# Patient Record
Sex: FEMALE | Race: BLACK OR AFRICAN AMERICAN | Employment: OTHER | URBAN - METROPOLITAN AREA
[De-identification: names, ages, dates, MRNs, and addresses within clinical notes are randomized per-mention and may not be internally consistent; named-entity substitution may affect disease eponyms.]

---

## 2017-01-03 ENCOUNTER — APPOINTMENT (OUTPATIENT)
Dept: GENERAL RADIOLOGY | Facility: HOSPITAL | Age: 82
DRG: 193 | End: 2017-01-03
Attending: EMERGENCY MEDICINE
Payer: MEDICARE

## 2017-01-03 ENCOUNTER — HOSPITAL ENCOUNTER (INPATIENT)
Facility: HOSPITAL | Age: 82
LOS: 8 days | Discharge: NURSING, REHAB OR LONG-TERM CARE | DRG: 193 | End: 2017-01-11
Attending: EMERGENCY MEDICINE | Admitting: HOSPITALIST
Payer: MEDICARE

## 2017-01-03 DIAGNOSIS — I65.21 STENOSIS OF RIGHT CAROTID ARTERY: ICD-10-CM

## 2017-01-03 DIAGNOSIS — E86.0 DEHYDRATION: ICD-10-CM

## 2017-01-03 DIAGNOSIS — J18.9 COMMUNITY ACQUIRED PNEUMONIA: ICD-10-CM

## 2017-01-03 DIAGNOSIS — E87.1 HYPONATREMIA: Primary | ICD-10-CM

## 2017-01-03 DIAGNOSIS — I63.511 ACUTE RIGHT MCA STROKE (HCC): ICD-10-CM

## 2017-01-03 LAB
ALBUMIN SERPL-MCNC: 2.7 G/DL (ref 3.5–4.8)
ALP LIVER SERPL-CCNC: 108 U/L (ref 55–142)
ALT SERPL-CCNC: 64 U/L (ref 14–54)
AST SERPL-CCNC: 104 U/L (ref 15–41)
BASOPHILS # BLD AUTO: 0.03 X10(3) UL (ref 0–0.1)
BASOPHILS NFR BLD AUTO: 0.3 %
BILIRUB SERPL-MCNC: 1.2 MG/DL (ref 0.1–2)
BUN BLD-MCNC: 30 MG/DL (ref 8–20)
CALCIUM BLD-MCNC: 8.7 MG/DL (ref 8.3–10.3)
CHLORIDE: 87 MMOL/L (ref 101–111)
CO2: 21 MMOL/L (ref 22–32)
CREAT BLD-MCNC: 1.68 MG/DL (ref 0.55–1.02)
EOSINOPHIL # BLD AUTO: 0.01 X10(3) UL (ref 0–0.3)
EOSINOPHIL NFR BLD AUTO: 0.1 %
ERYTHROCYTE [DISTWIDTH] IN BLOOD BY AUTOMATED COUNT: 11.9 % (ref 11.5–16)
GLUCOSE BLD-MCNC: 170 MG/DL (ref 70–99)
HCT VFR BLD AUTO: 32.8 % (ref 34–50)
HGB BLD-MCNC: 11.8 G/DL (ref 12–16)
IMMATURE GRANULOCYTE COUNT: 0.1 X10(3) UL (ref 0–1)
IMMATURE GRANULOCYTE RATIO %: 0.9 %
LYMPHOCYTES # BLD AUTO: 0.52 X10(3) UL (ref 0.9–4)
LYMPHOCYTES NFR BLD AUTO: 4.5 %
M PROTEIN MFR SERPL ELPH: 8.4 G/DL (ref 6.1–8.3)
MCH RBC QN AUTO: 34.6 PG (ref 27–33.2)
MCHC RBC AUTO-ENTMCNC: 36 G/DL (ref 31–37)
MCV RBC AUTO: 96.2 FL (ref 81–100)
MONOCYTES # BLD AUTO: 0.97 X10(3) UL (ref 0.1–0.6)
MONOCYTES NFR BLD AUTO: 8.3 %
NEUTROPHIL ABS PRELIM: 10.01 X10 (3) UL (ref 1.3–6.7)
NEUTROPHILS # BLD AUTO: 10.01 X10(3) UL (ref 1.3–6.7)
NEUTROPHILS NFR BLD AUTO: 85.9 %
PLATELET # BLD AUTO: 371 10(3)UL (ref 150–450)
POTASSIUM SERPL-SCNC: 4.1 MMOL/L (ref 3.6–5.1)
RBC # BLD AUTO: 3.41 X10(6)UL (ref 3.8–5.1)
RED CELL DISTRIBUTION WIDTH-SD: 41.7 FL (ref 35.1–46.3)
SODIUM SERPL-SCNC: 122 MMOL/L (ref 136–144)
TSI SER-ACNC: 1.86 MIU/ML (ref 0.35–5.5)
WBC # BLD AUTO: 11.6 X10(3) UL (ref 4–13)

## 2017-01-03 PROCEDURE — 99223 1ST HOSP IP/OBS HIGH 75: CPT | Performed by: INTERNAL MEDICINE

## 2017-01-03 PROCEDURE — 71010 XR CHEST AP PORTABLE  (CPT=71010): CPT

## 2017-01-03 RX ORDER — POLYETHYLENE GLYCOL 3350 17 G/17G
17 POWDER, FOR SOLUTION ORAL DAILY PRN
Status: DISCONTINUED | OUTPATIENT
Start: 2017-01-03 | End: 2017-01-11

## 2017-01-03 RX ORDER — SODIUM CHLORIDE 9 MG/ML
1000 INJECTION, SOLUTION INTRAVENOUS ONCE
Status: COMPLETED | OUTPATIENT
Start: 2017-01-03 | End: 2017-01-03

## 2017-01-03 RX ORDER — ACETAMINOPHEN 325 MG/1
650 TABLET ORAL EVERY 6 HOURS PRN
Status: DISCONTINUED | OUTPATIENT
Start: 2017-01-03 | End: 2017-01-06 | Stop reason: DRUGHIGH

## 2017-01-03 RX ORDER — SODIUM CHLORIDE 9 MG/ML
INJECTION, SOLUTION INTRAVENOUS CONTINUOUS
Status: DISCONTINUED | OUTPATIENT
Start: 2017-01-03 | End: 2017-01-07

## 2017-01-03 RX ORDER — IPRATROPIUM BROMIDE AND ALBUTEROL SULFATE 2.5; .5 MG/3ML; MG/3ML
3 SOLUTION RESPIRATORY (INHALATION)
Status: DISCONTINUED | OUTPATIENT
Start: 2017-01-03 | End: 2017-01-05

## 2017-01-03 RX ORDER — HEPARIN SODIUM 5000 [USP'U]/ML
5000 INJECTION, SOLUTION INTRAVENOUS; SUBCUTANEOUS EVERY 8 HOURS
Status: DISCONTINUED | OUTPATIENT
Start: 2017-01-03 | End: 2017-01-05

## 2017-01-03 RX ORDER — ONDANSETRON 2 MG/ML
4 INJECTION INTRAMUSCULAR; INTRAVENOUS EVERY 6 HOURS PRN
Status: DISCONTINUED | OUTPATIENT
Start: 2017-01-03 | End: 2017-01-06

## 2017-01-03 NOTE — ED INITIAL ASSESSMENT (HPI)
Cough and congestion since Tuesday.  with patient.  said patient has had three syncopal episodes since. Patient complaining right sided rib cage pain.  said patient fell on her right side with one of the syncopal episodes.  Denies fever

## 2017-01-04 ENCOUNTER — APPOINTMENT (OUTPATIENT)
Dept: CV DIAGNOSTICS | Facility: HOSPITAL | Age: 82
DRG: 193 | End: 2017-01-04
Attending: INTERNAL MEDICINE
Payer: MEDICARE

## 2017-01-04 ENCOUNTER — APPOINTMENT (OUTPATIENT)
Dept: CT IMAGING | Facility: HOSPITAL | Age: 82
DRG: 193 | End: 2017-01-04
Attending: INTERNAL MEDICINE
Payer: MEDICARE

## 2017-01-04 LAB
ALLENS TEST: POSITIVE
ARTERIAL BLD GAS O2 SATURATION: 97 % (ref 92–100)
ARTERIAL BLOOD GAS BASE EXCESS: -4.8
ARTERIAL BLOOD GAS HCO3: 18.7 MEQ/L (ref 22–26)
ARTERIAL BLOOD GAS PCO2: 30 MM HG (ref 35–45)
ARTERIAL BLOOD GAS PH: 7.41 (ref 7.35–7.45)
ARTERIAL BLOOD GAS PO2: 125 MM HG (ref 80–105)
ATRIAL RATE: 28 BPM
ATRIAL RATE: 77 BPM
BILIRUB UR QL STRIP.AUTO: NEGATIVE
BUN BLD-MCNC: 27 MG/DL (ref 8–20)
CALCIUM BLD-MCNC: 8 MG/DL (ref 8.3–10.3)
CALCULATED O2 SATURATION: 99 % (ref 92–100)
CARBOXYHEMOGLOBIN: 1 % SAT (ref 0–3)
CHLORIDE: 93 MMOL/L (ref 101–111)
CLARITY UR REFRACT.AUTO: CLEAR
CO2: 21 MMOL/L (ref 22–32)
COLOR UR AUTO: YELLOW
CREAT BLD-MCNC: 1.04 MG/DL (ref 0.55–1.02)
GLUCOSE BLD-MCNC: 114 MG/DL (ref 65–99)
GLUCOSE BLD-MCNC: 91 MG/DL (ref 70–99)
GLUCOSE UR STRIP.AUTO-MCNC: NEGATIVE MG/DL
L/M: 2 L/MIN
METHEMOGLOBIN: 0.6 % SAT (ref 0.4–1.5)
NITRITE UR QL STRIP.AUTO: NEGATIVE
OSMOLALITY URINE: 562 MOSM/KG (ref 300–1300)
P AXIS: 62 DEGREES
P-R INTERVAL: 136 MS
PATIENT TEMPERATURE: 98.5 F
PH UR STRIP.AUTO: 6 [PH] (ref 4.5–8)
POTASSIUM SERPL-SCNC: 3.5 MMOL/L (ref 3.6–5.1)
PROCALCITONIN SERPL-MCNC: 0.95 NG/ML (ref ?–0.11)
PROT UR STRIP.AUTO-MCNC: 30 MG/DL
Q-T INTERVAL: 410 MS
Q-T INTERVAL: 498 MS
QRS DURATION: 92 MS
QRS DURATION: 94 MS
QTC CALCULATION (BEZET): 463 MS
QTC CALCULATION (BEZET): 488 MS
R AXIS: 18 DEGREES
R AXIS: 31 DEGREES
RBC UR QL AUTO: NEGATIVE
RESPIRATORY PANEL NEG:: NEGATIVE
SODIUM SERPL-SCNC: 124 MMOL/L (ref 136–144)
SODIUM SERPL-SCNC: 13 MMOL/L
SP GR UR STRIP.AUTO: 1.02 (ref 1–1.03)
T AXIS: 47 DEGREES
T AXIS: 53 DEGREES
TOTAL HEMOGLOBIN: 11.7 G/DL (ref 11.7–16)
TSI SER-ACNC: 1.15 MIU/ML (ref 0.35–5.5)
UROBILINOGEN UR STRIP.AUTO-MCNC: <2 MG/DL
VENTRICULAR RATE: 58 BPM
VENTRICULAR RATE: 77 BPM

## 2017-01-04 PROCEDURE — 99232 SBSQ HOSP IP/OBS MODERATE 35: CPT | Performed by: INTERNAL MEDICINE

## 2017-01-04 PROCEDURE — 70450 CT HEAD/BRAIN W/O DYE: CPT

## 2017-01-04 PROCEDURE — 93306 TTE W/DOPPLER COMPLETE: CPT | Performed by: INTERNAL MEDICINE

## 2017-01-04 PROCEDURE — 93306 TTE W/DOPPLER COMPLETE: CPT

## 2017-01-04 RX ORDER — ARIPIPRAZOLE 15 MG/1
40 TABLET ORAL ONCE
Status: COMPLETED | OUTPATIENT
Start: 2017-01-04 | End: 2017-01-04

## 2017-01-04 RX ORDER — SPIRONOLACTONE 25 MG/1
12.5 TABLET ORAL DAILY
Status: DISCONTINUED | OUTPATIENT
Start: 2017-01-05 | End: 2017-01-10

## 2017-01-04 NOTE — CM/SW NOTE
SW received order for home health and met with pt and pt's spouse for assessment and d/c planning. Pt is a 80 y.o female admitted on 01/03 with dx of hyponatremia. Pt has no recent admissions per chart review. Pt is A&O and on room air.  Pt reports living

## 2017-01-04 NOTE — PAYOR COMM NOTE
Attending Physician: Karine Aldrich MD    1/3    ED  LATE    Syncope     Stated Complaint: SYNCOPE COUPLE TIMES THE PAST COUPLE DAYS        71-year-old  who presents the emergency room today for complaint of weakness, FATIGUE, SOB    Patient has been fe 122 (*)        Chloride  87 (*)        CO2  21.0 (*)                  RBC  3.41 (*)        HGB  11.8 (*)        HCT  32.8 (*)        MCH  34.6 (*)        Neutrophil Absolute Prelim  10.01 (*)        Neutrophil Absolute  10.01 (*)        Lymphocyte Absolute NCAL INFUSION  BLOOD CXS X 2

## 2017-01-04 NOTE — PROGRESS NOTES
[facility]  Progress Note    Annamarie Prashant Patient Status:  Emergency    1935 MRN [id number]   Location [address] Attending Feng Patel MD   Hosp Day # 1 PCP Magy Malave MD     No acute issues overnight  + 11.8*   MCV 96.2   .0         Recent Labs   01/03/17  1803 01/04/17  0644   * 124*   K 4.1 3.5*   CL 87* 93*   CO2 21.0* 21.0*   BUN 30* 27*   CREATSERUM 1.68* 1.04*   CA 8.7 8.0*         Recent Labs   01/03/17  1803   ALT 64*   *   ALB

## 2017-01-04 NOTE — CONSULTS
[facility]  Report of Consultation    Gagandeep Spain Patient Status:  Emergency    1935 MRN [id number]   Location [address] [facility] Attending Bren Phillip MD   Hosp Day # 0 PCP Luigi Pack MD     [facility] for [facility] SERVICES, [facility] ([facility]) Comments)    Comment:hyponatremia    Current Medications:    Current facility-administered medications:   •  ipratropium-albuterol (DUONEB) nebulizer solution 3 mL, 3 mL, Nebulization, Q20 Min PRN  •  CefTRIAXone Sodium (ROCEPHIN) 1 g in sodium chloride 0. 01/03/2017   K 4.1 01/03/2017   CL 87 01/03/2017   CO2 21.0 01/03/2017    01/03/2017   CA 8.7 01/03/2017   ALB 2.7 01/03/2017   ALKPHO 108 01/03/2017   BILT 1.2 01/03/2017   TP 8.4 01/03/2017    01/03/2017   ALT 64 01/03/2017   TSH 1.860 01/0

## 2017-01-04 NOTE — H&P
[facility]  History and Physical     Jamshid Bio Patient Status:  Inpatient    1935 MRN [id number]   [facility] 3NE-A Attending Josie Easley MD   Hosp Day # 0 PCP Eunice Lyle MD     Chief Complaint: weak    Hist Brother    • Diabetes Mother      rheumatic heart disease       Allergies:    Ace Inhibitors          Coughing  Amlodipine              Swelling  Furosemide              Other (See Comments)    Comment:hyponatremia  Thiazide-Type Diure*    Other (See Commen Labs:  Recent Labs   Lab  01/03/17   1803   WBC  11.6   HGB  11.8*   MCV  96.2   PLT  371.0       Recent Labs   Lab  01/03/17   1803   GLU  170*   BUN  30*   CREATSERUM  1.68*   CA  8.7   ALB  2.7*   NA  122*   K  4.1   CL  87*   CO2  21.0*   ALKPHO  1

## 2017-01-04 NOTE — PLAN OF CARE
Problem: DISCHARGE PLANNING - CASE MANAGEMENT  Goal: Discharge to post-acute care or home with appropriate resources  INTERVENTIONS:  - Conduct assessment to determine patient/family and health care team treatment goals, and need for post-acute services ba

## 2017-01-04 NOTE — CONSULTS
[facility]    PATIENT'S NAME: Colleen Marco A PHYSICIAN: Lisa Dawkins MD   CONSULTING PHYSICIAN: Halina Huber MD   PATIENT ACCOUNT#:   [id number]    LOCATION:  [address] [place] [address]  MEDICAL RECORD #:   [id number]       DATE OF BIRTH:  [birth year] complains of fatigue and weakness. HEENT: Eyes:  She denies recent vision loss or changes. ENT:  She denies recent hearing loss or changes. RESPIRATORY: As above. CARDIOVASCULAR: As above. GASTROINTESTINAL: She complains of a decreased appetite. history of this. Renal service has been consulted. 4.   Acute renal failure. This is most likely due to dehydration. 5.   Elevated liver function tests. I would recommend hydration and monitor these. 6.   Anemia. This is chronic.     Thank you for

## 2017-01-04 NOTE — PLAN OF CARE
GASTROINTESTINAL - ADULT    • Minimal or absence of nausea and vomiting Progressing    • Maintains or returns to baseline bowel function Progressing    • Maintains adequate nutritional intake (undernourished) Progressing    • Achieves appropriate nutrition

## 2017-01-04 NOTE — CONSULTS
Cardiology consult SOB    URI for over a week; now with community acquired pneumonia  Fatigue, weakness, SOB  No prior cardiac history  Hyponatremia  ARF  Anemia    Plan    Echo for LV function with SOB  Antibiotics per admitting  Hydration    Dictated: #

## 2017-01-04 NOTE — PROGRESS NOTES
[facility]  Progress Note     Dante Root Patient Status:  Inpatient    1935 MRN [id number]   [facility] 3NE-A Attending Brooklyn Sarkar MD   Hosp Day # 1 PCP Minor Bundy MD     Chief Complaint: weak     S: Patient 1. Pneumonia  1. CXR with possible L basilar infiltrate vs atelectasis  2. Will start treatment for CAP with ceftriaxone, azithromycin  3. Will send legionella urinary antigen given diarrhea, hyponatremia, mild transaminitis, and exposure to steam  4.

## 2017-01-04 NOTE — PROGRESS NOTES
NURSING ADMISSION NOTE      Patient admitted via Cart  Oriented to room. Safety precautions initiated. Bed in low position. Call light in reach.

## 2017-01-04 NOTE — ED PROVIDER NOTES
Patient Seen in: [facility] Emergency Department    History   Patient presents with:  Syncope (cardiovascular, neurologic)    Stated Complaint: [facility]    HPI    28-year-old -American female who presents the Oral Tab,  Take 1 tablet (300 mg total) by mouth 3 (three) times daily. PREVIDENT 5000 SENSITIVE 1.1-5 % Dental Paste,     Biotin 10 MG Oral Cap,  Take  by mouth. Mometasone Furoate (ELOCON) 0.1 % Apply Externally Cream,  Apply  topically daily. guarding noted no hepatosplenomegaly is noted. No masses are noted. No hernias are palpated. Extremity exam reveals no clubbing cyanosis or edema. Patient has good radial pulses noted bilaterally in the upper extremities .     There are no rashes note ischemic inferior wall MI as interpreted by the computer. EKG    Rate, intervals and axes as noted on EKG Report. Rate: 77 bpm  Rhythm: Sinus Rhythm  Reading: Normal sinus rhythm without acute ischemic changes noted.   Agree with computer report f

## 2017-01-04 NOTE — PROGRESS NOTES
Seen and examined. Notes reviewed. Currently resting comfortably and not c/o difficulty breathing.     NSR on tele   Mild systolic HTN this morning    Echo with preserved LV systolic function    Serum sodium improved but remains very low suggesting the pres

## 2017-01-05 ENCOUNTER — APPOINTMENT (OUTPATIENT)
Dept: GENERAL RADIOLOGY | Facility: HOSPITAL | Age: 82
DRG: 193 | End: 2017-01-05
Attending: Other
Payer: MEDICARE

## 2017-01-05 ENCOUNTER — APPOINTMENT (OUTPATIENT)
Dept: GENERAL RADIOLOGY | Facility: HOSPITAL | Age: 82
DRG: 193 | End: 2017-01-05
Attending: HOSPITALIST
Payer: MEDICARE

## 2017-01-05 ENCOUNTER — APPOINTMENT (OUTPATIENT)
Dept: CT IMAGING | Facility: HOSPITAL | Age: 82
DRG: 193 | End: 2017-01-05
Attending: NURSE PRACTITIONER
Payer: MEDICARE

## 2017-01-05 LAB
ALBUMIN SERPL-MCNC: 2 G/DL (ref 3.5–4.8)
ALLENS TEST: POSITIVE
ALLENS TEST: POSITIVE
ALP LIVER SERPL-CCNC: 94 U/L (ref 55–142)
ALT SERPL-CCNC: 57 U/L (ref 14–54)
ANTIBODY SCREEN: NEGATIVE
ARTERIAL BLD GAS O2 SATURATION: 93 % (ref 92–100)
ARTERIAL BLD GAS O2 SATURATION: 97 % (ref 92–100)
ARTERIAL BLOOD GAS BASE EXCESS: -6.3
ARTERIAL BLOOD GAS BASE EXCESS: -6.8
ARTERIAL BLOOD GAS HCO3: 17 MEQ/L (ref 22–26)
ARTERIAL BLOOD GAS HCO3: 17.3 MEQ/L (ref 22–26)
ARTERIAL BLOOD GAS PCO2: 28 MM HG (ref 35–45)
ARTERIAL BLOOD GAS PCO2: 29 MM HG (ref 35–45)
ARTERIAL BLOOD GAS PH: 7.39 (ref 7.35–7.45)
ARTERIAL BLOOD GAS PH: 7.4 (ref 7.35–7.45)
ARTERIAL BLOOD GAS PO2: 108 MM HG (ref 80–105)
ARTERIAL BLOOD GAS PO2: 70 MM HG (ref 80–105)
AST SERPL-CCNC: 64 U/L (ref 15–41)
BASOPHILS # BLD AUTO: 0.03 X10(3) UL (ref 0–0.1)
BASOPHILS NFR BLD AUTO: 0.4 %
BILIRUB SERPL-MCNC: 0.5 MG/DL (ref 0.1–2)
BILIRUB UR QL STRIP.AUTO: NEGATIVE
BUN BLD-MCNC: 14 MG/DL (ref 8–20)
BUN BLD-MCNC: 16 MG/DL (ref 8–20)
BUN BLD-MCNC: 16 MG/DL (ref 8–20)
CALCIUM BLD-MCNC: 7.9 MG/DL (ref 8.3–10.3)
CALCULATED O2 SATURATION: 93 % (ref 92–100)
CALCULATED O2 SATURATION: 98 % (ref 92–100)
CARBOXYHEMOGLOBIN: 0.8 % SAT (ref 0–3)
CARBOXYHEMOGLOBIN: 1 % SAT (ref 0–3)
CHLORIDE: 101 MMOL/L (ref 101–111)
CHLORIDE: 101 MMOL/L (ref 101–111)
CHLORIDE: 103 MMOL/L (ref 101–111)
CLARITY UR REFRACT.AUTO: CLEAR
CO2: 21 MMOL/L (ref 22–32)
COLOR UR AUTO: YELLOW
CREAT BLD-MCNC: 0.78 MG/DL (ref 0.55–1.02)
CREAT BLD-MCNC: 0.78 MG/DL (ref 0.55–1.02)
CREAT BLD-MCNC: 0.88 MG/DL (ref 0.55–1.02)
EOSINOPHIL # BLD AUTO: 0.04 X10(3) UL (ref 0–0.3)
EOSINOPHIL NFR BLD AUTO: 0.5 %
ERYTHROCYTE [DISTWIDTH] IN BLOOD BY AUTOMATED COUNT: 12.3 % (ref 11.5–16)
ERYTHROCYTE [DISTWIDTH] IN BLOOD BY AUTOMATED COUNT: 12.7 % (ref 11.5–16)
GLUCOSE BLD-MCNC: 108 MG/DL (ref 70–99)
GLUCOSE BLD-MCNC: 94 MG/DL (ref 70–99)
GLUCOSE BLD-MCNC: 94 MG/DL (ref 70–99)
GLUCOSE BLD-MCNC: 99 MG/DL (ref 65–99)
GLUCOSE UR STRIP.AUTO-MCNC: NEGATIVE MG/DL
HAV IGM SER QL: 2.5 MG/DL (ref 1.7–3)
HCT VFR BLD AUTO: 26.6 % (ref 34–50)
HCT VFR BLD AUTO: 26.8 % (ref 34–50)
HGB BLD-MCNC: 9.1 G/DL (ref 12–16)
HGB BLD-MCNC: 9.4 G/DL (ref 12–16)
HSCRP: >28.5 MG/L (ref ?–3)
IMMATURE GRANULOCYTE COUNT: 0.05 X10(3) UL (ref 0–1)
IMMATURE GRANULOCYTE RATIO %: 0.7 %
INR BLD: 1.2 (ref 0.89–1.12)
KETONES UR STRIP.AUTO-MCNC: NEGATIVE MG/DL
L/M: 2 L/MIN
L/M: 3 L/MIN
LEGIONELLA PNEUMOPHILA AG, URI: NEGATIVE
LEUKOCYTE ESTERASE UR QL STRIP.AUTO: NEGATIVE
LYMPHOCYTES # BLD AUTO: 1.01 X10(3) UL (ref 0.9–4)
LYMPHOCYTES NFR BLD AUTO: 13.5 %
M PROTEIN MFR SERPL ELPH: 7.1 G/DL (ref 6.1–8.3)
MCH RBC QN AUTO: 34.5 PG (ref 27–33.2)
MCH RBC QN AUTO: 34.8 PG (ref 27–33.2)
MCHC RBC AUTO-ENTMCNC: 34 G/DL (ref 31–37)
MCHC RBC AUTO-ENTMCNC: 35.3 G/DL (ref 31–37)
MCV RBC AUTO: 101.5 FL (ref 81–100)
MCV RBC AUTO: 98.5 FL (ref 81–100)
METHEMOGLOBIN: 0.5 % SAT (ref 0.4–1.5)
METHEMOGLOBIN: 0.6 % SAT (ref 0.4–1.5)
MONOCYTES # BLD AUTO: 0.79 X10(3) UL (ref 0.1–0.6)
MONOCYTES NFR BLD AUTO: 10.6 %
NEUTROPHIL ABS PRELIM: 5.56 X10 (3) UL (ref 1.3–6.7)
NEUTROPHILS # BLD AUTO: 5.56 X10(3) UL (ref 1.3–6.7)
NEUTROPHILS NFR BLD AUTO: 74.3 %
NITRITE UR QL STRIP.AUTO: NEGATIVE
PATIENT TEMPERATURE: 98.1 F
PATIENT TEMPERATURE: 98.9 F
PH UR STRIP.AUTO: 6 [PH] (ref 4.5–8)
PLATELET # BLD AUTO: 370 10(3)UL (ref 150–450)
PLATELET # BLD AUTO: 370 10(3)UL (ref 150–450)
POTASSIUM SERPL-SCNC: 3.6 MMOL/L (ref 3.6–5.1)
POTASSIUM SERPL-SCNC: 3.6 MMOL/L (ref 3.6–5.1)
POTASSIUM SERPL-SCNC: 4.1 MMOL/L (ref 3.6–5.1)
PROT UR STRIP.AUTO-MCNC: 30 MG/DL
PSA SERPL DL<=0.01 NG/ML-MCNC: 15.6 SECONDS (ref 12.3–14.8)
RBC # BLD AUTO: 2.64 X10(6)UL (ref 3.8–5.1)
RBC # BLD AUTO: 2.7 X10(6)UL (ref 3.8–5.1)
RBC UR QL AUTO: NEGATIVE
RED CELL DISTRIBUTION WIDTH-SD: 44.5 FL (ref 35.1–46.3)
RED CELL DISTRIBUTION WIDTH-SD: 47.2 FL (ref 35.1–46.3)
RH BLOOD TYPE: POSITIVE
SODIUM SERPL-SCNC: 130 MMOL/L (ref 136–144)
SODIUM SERPL-SCNC: 131 MMOL/L (ref 136–144)
SODIUM SERPL-SCNC: 131 MMOL/L (ref 136–144)
SP GR UR STRIP.AUTO: 1.02 (ref 1–1.03)
TOTAL HEMOGLOBIN: 10.2 G/DL (ref 11.7–16)
TOTAL HEMOGLOBIN: 12.5 G/DL (ref 11.7–16)
UROBILINOGEN UR STRIP.AUTO-MCNC: 2 MG/DL
WBC # BLD AUTO: 7.5 X10(3) UL (ref 4–13)
WBC # BLD AUTO: 7.5 X10(3) UL (ref 4–13)

## 2017-01-05 PROCEDURE — 99291 CRITICAL CARE FIRST HOUR: CPT | Performed by: OTHER

## 2017-01-05 PROCEDURE — 3E03317 INTRODUCTION OF OTHER THROMBOLYTIC INTO PERIPHERAL VEIN, PERCUTANEOUS APPROACH: ICD-10-PCS | Performed by: OTHER

## 2017-01-05 PROCEDURE — 99232 SBSQ HOSP IP/OBS MODERATE 35: CPT | Performed by: INTERNAL MEDICINE

## 2017-01-05 PROCEDURE — 99233 SBSQ HOSP IP/OBS HIGH 50: CPT | Performed by: INTERNAL MEDICINE

## 2017-01-05 PROCEDURE — 71010 XR CHEST AP PORTABLE  (CPT=71010): CPT

## 2017-01-05 PROCEDURE — 70498 CT ANGIOGRAPHY NECK: CPT

## 2017-01-05 PROCEDURE — 70496 CT ANGIOGRAPHY HEAD: CPT

## 2017-01-05 RX ORDER — SODIUM CHLORIDE 9 MG/ML
INJECTION, SOLUTION INTRAVENOUS ONCE
Status: COMPLETED | OUTPATIENT
Start: 2017-01-05 | End: 2017-01-05

## 2017-01-05 RX ORDER — METOPROLOL TARTRATE 5 MG/5ML
5 INJECTION INTRAVENOUS EVERY 6 HOURS PRN
Status: DISCONTINUED | OUTPATIENT
Start: 2017-01-05 | End: 2017-01-11

## 2017-01-05 RX ORDER — METHYLPREDNISOLONE SODIUM SUCCINATE 40 MG/ML
60 INJECTION, POWDER, LYOPHILIZED, FOR SOLUTION INTRAMUSCULAR; INTRAVENOUS EVERY 8 HOURS
Status: COMPLETED | OUTPATIENT
Start: 2017-01-05 | End: 2017-01-06

## 2017-01-05 RX ORDER — SODIUM CHLORIDE 9 MG/ML
INJECTION, SOLUTION INTRAVENOUS CONTINUOUS
Status: DISCONTINUED | OUTPATIENT
Start: 2017-01-05 | End: 2017-01-05

## 2017-01-05 RX ORDER — ASPIRIN 325 MG
325 TABLET ORAL DAILY
Status: DISCONTINUED | OUTPATIENT
Start: 2017-01-05 | End: 2017-01-05

## 2017-01-05 RX ORDER — ASPIRIN 300 MG
300 SUPPOSITORY, RECTAL RECTAL DAILY
Status: DISCONTINUED | OUTPATIENT
Start: 2017-01-05 | End: 2017-01-05

## 2017-01-05 RX ORDER — LABETALOL HYDROCHLORIDE 5 MG/ML
10 INJECTION, SOLUTION INTRAVENOUS ONCE
Status: DISCONTINUED | OUTPATIENT
Start: 2017-01-05 | End: 2017-01-05

## 2017-01-05 RX ORDER — IPRATROPIUM BROMIDE AND ALBUTEROL SULFATE 2.5; .5 MG/3ML; MG/3ML
3 SOLUTION RESPIRATORY (INHALATION)
Status: DISCONTINUED | OUTPATIENT
Start: 2017-01-05 | End: 2017-01-05 | Stop reason: DRUGHIGH

## 2017-01-05 RX ORDER — ATORVASTATIN CALCIUM 80 MG/1
80 TABLET, FILM COATED ORAL NIGHTLY
Status: DISCONTINUED | OUTPATIENT
Start: 2017-01-05 | End: 2017-01-05

## 2017-01-05 RX ORDER — HEPARIN SODIUM 5000 [USP'U]/ML
5000 INJECTION, SOLUTION INTRAVENOUS; SUBCUTANEOUS EVERY 8 HOURS
Status: DISCONTINUED | OUTPATIENT
Start: 2017-01-05 | End: 2017-01-05

## 2017-01-05 RX ORDER — IPRATROPIUM BROMIDE AND ALBUTEROL SULFATE 2.5; .5 MG/3ML; MG/3ML
SOLUTION RESPIRATORY (INHALATION)
Status: COMPLETED
Start: 2017-01-05 | End: 2017-01-05

## 2017-01-05 RX ORDER — IPRATROPIUM BROMIDE AND ALBUTEROL SULFATE 2.5; .5 MG/3ML; MG/3ML
3 SOLUTION RESPIRATORY (INHALATION) EVERY 4 HOURS
Status: DISCONTINUED | OUTPATIENT
Start: 2017-01-05 | End: 2017-01-08

## 2017-01-05 RX ORDER — ACETAMINOPHEN 650 MG/1
650 SUPPOSITORY RECTAL EVERY 4 HOURS PRN
Status: DISCONTINUED | OUTPATIENT
Start: 2017-01-05 | End: 2017-01-06

## 2017-01-05 NOTE — PHYSICAL THERAPY NOTE
Attempted to see pt for PT eval this am, however, per RN, pt is not appropriate for PT this am as pt is weak, lethargic, and \"wiped out from the PNA\". RN request to hold PT eval until 1/6/17.  Therefore, will hold PT eval and attempt to see pt on 1/6/17 a

## 2017-01-05 NOTE — CONSULTS
[facility]  Neurocritical Care       Name: Guerda Darden  MRN: [id number]  Admission Date/Time: 1/3/2017  5:47 PM  Primary Care Provider:  Andreae Jones MD        Reason for Consultation:   Left side hemiparesis, left side neglect an • Localized, secondary osteoarthritis of the lower leg    • Tibial fracture    • Hyponatremia 2009   • Endometrial cancer ([facility] [address] 2009   • Fibrocystic breast changes 2009   • Atypical chest pain 03/10   • Osteoarthritis of hip 11/09   • Unspecified essenti 0 Standard drinks or equivalent per week       Comment: 1-2 glasses wine or martini daily    Drug Use: No              Sexual Activity: Yes               Partners with: Male    Other Topics            Concern  Blood Transfusions      Yes    Comment:14 yr Oral Daily   CefTRIAXone Sodium (ROCEPHIN) 1 g in sodium chloride 0.9 % 100 mL IVPB Add-vantage 1 g Intravenous Q24H   azithromycin (ZITHROMAX) 500 mg in sodium chloride 0.9 % 250 mL IVPB add-vantage 500 mg Intravenous Q24H   0.9%  NaCl infusion  Intraveno normally. # 11: Shoulder shrug is normal and symmetrical.   #12:Tongue is midline. Power of tongue normal.        Motor: LUE 2-3/5. LLE 2-3/5. Right side is normal 5/5. Sensory: Decreased sensations to the left. Gait: Deferred.     Diagnostics:   Ct Brai CONCLUSION:  Increasing size of small to medium-sized left and stable trace right pleural effusions. Increased/consolidation in the mid lower left lower lung. New minimal atelectasis at right lung base.    A preliminary report was provided by the [facility] tel preference. 2. MARLYS critical stenosis on CTA, could be a reason for hypoperfusion and stroke. 3. Syncope and Dehydration  4. CAP    Plan:  1. Initiate Stroke Order Set.  2. IV t-pa bolus at 17.10 followed by infusion over 1 hour. 3. Lipid Panel.  Start L

## 2017-01-05 NOTE — PLAN OF CARE
RESPIRATORY - ADULT    • Achieves optimal ventilation and oxygenation Not Progressing          GASTROINTESTINAL - ADULT    • Minimal or absence of nausea and vomiting Progressing    • Maintains or returns to baseline bowel function Progressing    • Maintai

## 2017-01-05 NOTE — OCCUPATIONAL THERAPY NOTE
Attempted to see pt for OT eval this am. Per PT, RN reports pt is not appropriate for therapy this am as pt is weak, lethargic, and \"wiped out from the PNA\". RN request to hold OT eval until 1/6/17.  Therefore, will hold OT eval and attempt to see pt as a

## 2017-01-05 NOTE — PROGRESS NOTES
[facility]  Progress Note     Kamran Seeds Patient Status:  Inpatient    1935 MRN [id number]   [facility] 3NE-A Attending Belkys Dobson MD   Hosp Day # 2 PCP Monae Rodriguez MD     Chief Complaint: weak     S: Patient reviewed in Epic.   MICRO: blood , UA, respir panel   Medications:   • ipratropium-albuterol  3 mL Nebulization 4 times per day   • Atorvastatin Calcium  80 mg Oral Nightly   • Labetalol HCl  10 mg Intravenous Once   • alteplase (tPA/ACTIVASE) STROKE bolus SpO2 98%    Eval at 10 AM  CV: RRR  PULM: CTAB

## 2017-01-05 NOTE — PROGRESS NOTES
Paged Dr. Maynor Butler in regards to pt. Lethargy. He placed order for ABG. Also notified him that orthostatic B/P is not a possibility at this time.   Also notified that Labetalol is TID and was given at 0800 and 1700, so holding 2100 dose till 0000    Paged

## 2017-01-06 ENCOUNTER — APPOINTMENT (OUTPATIENT)
Dept: MRI IMAGING | Facility: HOSPITAL | Age: 82
DRG: 193 | End: 2017-01-06
Attending: Other
Payer: MEDICARE

## 2017-01-06 ENCOUNTER — APPOINTMENT (OUTPATIENT)
Dept: GENERAL RADIOLOGY | Facility: HOSPITAL | Age: 82
DRG: 193 | End: 2017-01-06
Attending: INTERNAL MEDICINE
Payer: MEDICARE

## 2017-01-06 ENCOUNTER — APPOINTMENT (OUTPATIENT)
Dept: CT IMAGING | Facility: HOSPITAL | Age: 82
DRG: 193 | End: 2017-01-06
Attending: Other
Payer: MEDICARE

## 2017-01-06 ENCOUNTER — APPOINTMENT (OUTPATIENT)
Dept: ULTRASOUND IMAGING | Facility: HOSPITAL | Age: 82
DRG: 193 | End: 2017-01-06
Attending: SURGERY
Payer: MEDICARE

## 2017-01-06 LAB
ALBUMIN SERPL-MCNC: 2.2 G/DL (ref 3.5–4.8)
ALP LIVER SERPL-CCNC: 98 U/L (ref 55–142)
ALT SERPL-CCNC: 58 U/L (ref 14–54)
AST SERPL-CCNC: 51 U/L (ref 15–41)
ATRIAL RATE: 163 BPM
BASOPHILS # BLD AUTO: 0.01 X10(3) UL (ref 0–0.1)
BASOPHILS NFR BLD AUTO: 0.1 %
BILIRUB SERPL-MCNC: 0.4 MG/DL (ref 0.1–2)
BUN BLD-MCNC: 11 MG/DL (ref 8–20)
CALCIUM BLD-MCNC: 8.3 MG/DL (ref 8.3–10.3)
CHLORIDE: 105 MMOL/L (ref 101–111)
CHOLEST SMN-MCNC: 134 MG/DL (ref ?–200)
CO2: 19 MMOL/L (ref 22–32)
CREAT BLD-MCNC: 0.68 MG/DL (ref 0.55–1.02)
EOSINOPHIL # BLD AUTO: 0 X10(3) UL (ref 0–0.3)
EOSINOPHIL NFR BLD AUTO: 0 %
ERYTHROCYTE [DISTWIDTH] IN BLOOD BY AUTOMATED COUNT: 12.6 % (ref 11.5–16)
GLUCOSE BLD-MCNC: 122 MG/DL (ref 65–99)
GLUCOSE BLD-MCNC: 122 MG/DL (ref 65–99)
GLUCOSE BLD-MCNC: 128 MG/DL (ref 65–99)
GLUCOSE BLD-MCNC: 135 MG/DL (ref 70–99)
HAV IGM SER QL: 2.4 MG/DL (ref 1.7–3)
HCT VFR BLD AUTO: 30.5 % (ref 34–50)
HDLC SERPL-MCNC: 43 MG/DL (ref 45–?)
HDLC SERPL: 3.12 {RATIO} (ref ?–4.44)
HGB BLD-MCNC: 10.5 G/DL (ref 12–16)
IMMATURE GRANULOCYTE COUNT: 0.06 X10(3) UL (ref 0–1)
IMMATURE GRANULOCYTE RATIO %: 0.8 %
LDLC SERPL CALC-MCNC: 75 MG/DL (ref ?–130)
LYMPHOCYTES # BLD AUTO: 0.38 X10(3) UL (ref 0.9–4)
LYMPHOCYTES NFR BLD AUTO: 4.8 %
M PROTEIN MFR SERPL ELPH: 7.5 G/DL (ref 6.1–8.3)
MCH RBC QN AUTO: 33.9 PG (ref 27–33.2)
MCHC RBC AUTO-ENTMCNC: 34.4 G/DL (ref 31–37)
MCV RBC AUTO: 98.4 FL (ref 81–100)
MONOCYTES # BLD AUTO: 0.11 X10(3) UL (ref 0.1–0.6)
MONOCYTES NFR BLD AUTO: 1.4 %
NEUTROPHIL ABS PRELIM: 7.4 X10 (3) UL (ref 1.3–6.7)
NEUTROPHILS # BLD AUTO: 7.4 X10(3) UL (ref 1.3–6.7)
NEUTROPHILS NFR BLD AUTO: 92.9 %
NONHDLC SERPL-MCNC: 91 MG/DL (ref ?–130)
PHOSPHATE SERPL-MCNC: 3.6 MG/DL (ref 2.5–4.9)
PLATELET # BLD AUTO: 409 10(3)UL (ref 150–450)
POTASSIUM SERPL-SCNC: 4.6 MMOL/L (ref 3.6–5.1)
POTASSIUM SERPL-SCNC: 4.6 MMOL/L (ref 3.6–5.1)
Q-T INTERVAL: 288 MS
QRS DURATION: 86 MS
QTC CALCULATION (BEZET): 421 MS
R AXIS: 19 DEGREES
RBC # BLD AUTO: 3.1 X10(6)UL (ref 3.8–5.1)
RED CELL DISTRIBUTION WIDTH-SD: 45.3 FL (ref 35.1–46.3)
SODIUM SERPL-SCNC: 133 MMOL/L (ref 136–144)
T AXIS: 37 DEGREES
TRIGLYCERIDES: 82 MG/DL (ref ?–150)
VENTRICULAR RATE: 129 BPM
VLDL: 16 MG/DL (ref 5–40)
WBC # BLD AUTO: 8 X10(3) UL (ref 4–13)

## 2017-01-06 PROCEDURE — 99291 CRITICAL CARE FIRST HOUR: CPT | Performed by: OTHER

## 2017-01-06 PROCEDURE — 70450 CT HEAD/BRAIN W/O DYE: CPT

## 2017-01-06 PROCEDURE — 99233 SBSQ HOSP IP/OBS HIGH 50: CPT | Performed by: INTERNAL MEDICINE

## 2017-01-06 PROCEDURE — 70553 MRI BRAIN STEM W/O & W/DYE: CPT

## 2017-01-06 PROCEDURE — 71010 XR CHEST AP PORTABLE  (CPT=71010): CPT

## 2017-01-06 PROCEDURE — 93880 EXTRACRANIAL BILAT STUDY: CPT

## 2017-01-06 RX ORDER — METOPROLOL TARTRATE 50 MG/1
50 TABLET, FILM COATED ORAL
Status: DISCONTINUED | OUTPATIENT
Start: 2017-01-06 | End: 2017-01-07

## 2017-01-06 RX ORDER — GUAIFENESIN 600 MG
600 TABLET, EXTENDED RELEASE 12 HR ORAL 2 TIMES DAILY
Status: DISCONTINUED | OUTPATIENT
Start: 2017-01-06 | End: 2017-01-11

## 2017-01-06 RX ORDER — ACETAMINOPHEN 650 MG/1
650 SUPPOSITORY RECTAL EVERY 4 HOURS PRN
Status: DISCONTINUED | OUTPATIENT
Start: 2017-01-06 | End: 2017-01-11

## 2017-01-06 RX ORDER — ATORVASTATIN CALCIUM 80 MG/1
80 TABLET, FILM COATED ORAL NIGHTLY
Status: DISCONTINUED | OUTPATIENT
Start: 2017-01-06 | End: 2017-01-09

## 2017-01-06 RX ORDER — ACETAMINOPHEN 325 MG/1
650 TABLET ORAL EVERY 4 HOURS PRN
Status: DISCONTINUED | OUTPATIENT
Start: 2017-01-06 | End: 2017-01-11

## 2017-01-06 RX ORDER — MELATONIN
100 DAILY
Status: DISCONTINUED | OUTPATIENT
Start: 2017-01-06 | End: 2017-01-11

## 2017-01-06 RX ORDER — ONDANSETRON 2 MG/ML
4 INJECTION INTRAMUSCULAR; INTRAVENOUS EVERY 6 HOURS PRN
Status: DISCONTINUED | OUTPATIENT
Start: 2017-01-06 | End: 2017-01-11

## 2017-01-06 RX ORDER — ASPIRIN 325 MG
325 TABLET ORAL DAILY
Status: DISCONTINUED | OUTPATIENT
Start: 2017-01-06 | End: 2017-01-09

## 2017-01-06 RX ORDER — FAMOTIDINE 20 MG/1
20 TABLET ORAL EVERY 12 HOURS SCHEDULED
Status: DISCONTINUED | OUTPATIENT
Start: 2017-01-06 | End: 2017-01-11

## 2017-01-06 RX ORDER — AMIODARONE HYDROCHLORIDE 200 MG/1
400 TABLET ORAL 2 TIMES DAILY WITH MEALS
Status: DISCONTINUED | OUTPATIENT
Start: 2017-01-07 | End: 2017-01-11

## 2017-01-06 RX ORDER — METHYLPREDNISOLONE SODIUM SUCCINATE 40 MG/ML
40 INJECTION, POWDER, LYOPHILIZED, FOR SOLUTION INTRAMUSCULAR; INTRAVENOUS EVERY 8 HOURS
Status: DISCONTINUED | OUTPATIENT
Start: 2017-01-06 | End: 2017-01-07

## 2017-01-06 RX ORDER — DIGOXIN 0.25 MG/ML
500 INJECTION INTRAMUSCULAR; INTRAVENOUS ONCE
Status: COMPLETED | OUTPATIENT
Start: 2017-01-06 | End: 2017-01-06

## 2017-01-06 RX ORDER — FAMOTIDINE 10 MG/ML
20 INJECTION, SOLUTION INTRAVENOUS EVERY 12 HOURS SCHEDULED
Status: DISCONTINUED | OUTPATIENT
Start: 2017-01-06 | End: 2017-01-11

## 2017-01-06 RX ORDER — LABETALOL HYDROCHLORIDE 5 MG/ML
10 INJECTION, SOLUTION INTRAVENOUS EVERY 10 MIN PRN
Status: COMPLETED | OUTPATIENT
Start: 2017-01-06 | End: 2017-01-07

## 2017-01-06 RX ORDER — ASPIRIN 300 MG
300 SUPPOSITORY, RECTAL RECTAL DAILY
Status: DISCONTINUED | OUTPATIENT
Start: 2017-01-06 | End: 2017-01-09

## 2017-01-06 RX ORDER — DILTIAZEM HYDROCHLORIDE 5 MG/ML
10 INJECTION INTRAVENOUS EVERY 2 HOUR PRN
Status: DISCONTINUED | OUTPATIENT
Start: 2017-01-06 | End: 2017-01-11

## 2017-01-06 RX ORDER — AMIODARONE HYDROCHLORIDE 50 MG/ML
300 INJECTION, SOLUTION INTRAVENOUS ONCE
Status: DISCONTINUED | OUTPATIENT
Start: 2017-01-06 | End: 2017-01-06 | Stop reason: SDUPTHER

## 2017-01-06 RX ORDER — DOXEPIN HYDROCHLORIDE 50 MG/1
1 CAPSULE ORAL DAILY
Status: DISCONTINUED | OUTPATIENT
Start: 2017-01-06 | End: 2017-01-11

## 2017-01-06 RX ORDER — ATORVASTATIN CALCIUM 80 MG/1
80 TABLET, FILM COATED ORAL NIGHTLY
Status: DISCONTINUED | OUTPATIENT
Start: 2017-01-06 | End: 2017-01-06

## 2017-01-06 RX ORDER — FOLIC ACID 1 MG/1
1 TABLET ORAL DAILY
Status: DISCONTINUED | OUTPATIENT
Start: 2017-01-06 | End: 2017-01-11

## 2017-01-06 NOTE — PROGRESS NOTES
[facility]  Progress Note     Dakotah Fairly Patient Status:  Inpatient    1935 MRN [id number]   [facility] 3NE-A Attending Kecia Jefferson MD   Hosp Day # 3 PCP Efe Arevalo MD     Chief Complaint: weak     S: Patient --   51*   ALT  64*   --   57*   --   58*   BILT  1.2   --   0.5   --   0.4   TP  8.4*   --   7.1   --   7.5    < > = values in this interval not displayed. Imaging: Imaging data reviewed in Epic.   MICRO: blood , UA, respir panel   All neg   Med (Oral)  Resp 24  Ht 162.6 cm (5' 4\")  Wt 147 lb (66.679 kg)  BMI 25.22 kg/m2  SpO2 100%  CV: irreg irreg  PULM: coarse breath sounds

## 2017-01-06 NOTE — RESPIRATORY THERAPY NOTE
PT WOB SEEMS LABORED. ON NC 2L DID ABG NOT SHOWING ANY CO2 RETENTION BUT PT IS WORKING HARD, USING ACCESSORY MUSCLES PT ALSO EXPRESSES DIFFICULTY- BS COARSE/EXP WHEEZES POOR AERATION. NEB TREATMENT GIVEN WITH LITTLE TO NO RELIEF.  HOPING TO CONSULT PULMONAR

## 2017-01-06 NOTE — PROGRESS NOTES
Called to [id number] at [phone] for Code Stroke. Upon arrival found patient with NIH of 6 . Last Known Normal at 1400. Patient Does not have contraindications for TPA. Accompanied patient to CT scanner at 1630 .   Discussed case with Dr. Margy Hodgkins, neurologis

## 2017-01-06 NOTE — CONSULTS
[facility]    PATIENT'S NAME: Valdez Camara   ATTENDING PHYSICIAN: Estephanie Cox MD   CONSULTING PHYSICIAN: Hunter Garces M.D.    PATIENT ACCOUNT#:   [de-identified]    LOCATION:  [address] [place] [address]  MEDICAL RECORD #:   [id number]       DATE OF BIRTH:  [birth year] methylprednisolone, nicardipine/Cardene for blood pressure control, metoprolol. ALLERGIES:  ACE inhibitors and amlodipine. SOCIAL HISTORY:  The patient is . She has no children. She is retired.   The patient denies ETOH abuse, drug abuse, or scheduled for an MRI for today. Will have a duplex ultrasound to evaluate the carotid arteries. The etiology behind this is not clear. The patient may have a dissection or may have thrown an embolism, although the echo does not suggest that.   Continue c

## 2017-01-06 NOTE — SLP NOTE
ADULT SWALLOWING EVALUATION    ASSESSMENT & PLAN   ASSESSMENT  B/S swallow eval ordered per stroke protocol.     Subjective: Eric Bush is a(n) 80year old female who had acute RMCA stroke with MARLYS stenosis yesterday with Left side hemiparesis, left elda office BP runs higher than home. Prior Living Situation:  (lived at [facility] in [place] w/ her )  Diet Prior to Admission: Regular; Thin liquids  Precautions: Aspiration    Patient/Family Goals: to eat    SWALLOWING HISTORY  Current Diet Co Swallow: No complaints consistent with possible esophageal involvement      GOALS  Goal #1 The patient will tolerate regular consistency and thin liquids without overt signs or symptoms of aspiration with 95 % accuracy over 2-3 session(s).    Goal #2 The pa

## 2017-01-06 NOTE — CONSULTS
[facility]  Report of Consultation    Osman Pickett Patient Status:  Inpatient    1935 MRN [id number]   [facility] 6NE-A Attending Glenna Perkins MD   Hosp Day # 2 PCP Kristal Murphy MD     Reason for Consultation:      [facility] higher than home. Family History   Problem Relation Age of Onset   • Diabetes Father    • Diabetes Brother    • Diabetes Mother      rheumatic heart disease      reports that she quit smoking about 22 years ago.  She does not have any smokeless tobacco SpO2   01/05/17 1900 (!) 165/73 mmHg - - 80 28 -   01/05/17 1845 155/72 mmHg - - 76 25 -   01/05/17 1830 153/71 mmHg - - 72 (!) 31 -   01/05/17 1815 158/72 mmHg - - 72 16 98 %   01/05/17 1800 153/70 mmHg - - 70 27 97 %   01/05/17 1745 146/74 mmHg - - 75 (! 01/05/2017   BUN 14 01/05/2017    01/05/2017   K 4.1 01/05/2017    01/05/2017   CO2 21.0 01/05/2017    01/05/2017   CA 7.9 01/05/2017   ALB 2.0 01/05/2017   ALKPHO 94 01/05/2017   BILT 0.5 01/05/2017   TP 7.1 01/05/2017   AST 64 01/05/20 breathing as possible      All of the above was discussed at length with the patient's  and daughter at bedside    Davida Hoffman      1/5/2017  40min  7:28 PM

## 2017-01-06 NOTE — OCCUPATIONAL THERAPY NOTE
OCCUPATIONAL THERAPY EVALUATION - INPATIENT     Room Number: 9738/1366-X  Evaluation Date: 1/6/2017  Type of Evaluation: Initial  Presenting Problem:  (Hypoatremia, CVA (infarcts noted bilat))    Physician Order: IP Consult to Occupational Therapy  Reason hypertension      office BP runs higher than home.        Past Surgical History      Past Surgical History    BREAST LUMPECTOMY  11/09    Comment benign fibroadenoma    TOTAL HIP REPLACEMENT  07/00    Comment at [facility], [address], right    [address] [place] [address] slow to respond     RANGE OF MOTION AND STRENGTH ASSESSMENT  Upper extremity ROM is within functional limits    Upper extremity strength is within functional limits Slight decrease noted in L UE during pushing task     COORDINATION  Gross Motor    WFL but Increased time needed d/t lethargy. Increased time d/t processing speed.  strength tested. Patient End of Session: Up in chair; With [facility] staff;Needs met;Call light within reach; All patient questions and concerns addressed; Family present    ASSESSMENT

## 2017-01-06 NOTE — PROGRESS NOTES
Asked to see again today given the development of an acute right hemispheric stroke. Seen earlier today and discussed with nursing staff. Was treated with IV TPA and has demonstrated a significant clinical improvement.     Imaging suggests thrombosis of she is at acceptable CV risk for carotid surgery if that is ultimately recommended. BP goals as directed by neurology.     CCT 1:15 - 2:10 pm

## 2017-01-06 NOTE — PROGRESS NOTES
Upon assessment, patient noted to have significant left arm and leg weakness and a facial droop, which was a change from previous assessment. Rapid response called. Code stroke initiated by Miriam Mckinley, Neurology APN. Patient taken to CT scan.   Transfer ordere

## 2017-01-06 NOTE — PROGRESS NOTES
Discussed BP parameters and medication regimen with [facility], Dr Jignesh Rodriguez. Since patient is currently NPO and SBP goal is 140 -180, scheduled oral labetalol will be dc'd and prn IV lopressor ordered for BP control.       DANIA Caldwell  Critical Care NP

## 2017-01-06 NOTE — PROGRESS NOTES
[facility]  Progress Note    Annamarie Prashant Patient Status:  Emergency    1935 MRN [id number]   Location [address] [facility] Attending Feng Patel MD   Hosp Day # 3 PCP Magy Maalve MD     Chart reviewed; noted to hav rhythm. No murmurs. Equal pulses   Abdomen: Soft, nontender, nondistended. Positive bowel sounds. No rebound tenderness   Neurologic: No focal neurological deficits. Musculoskeletal: Full range of motion of all extremities. No swelling noted.    Integu

## 2017-01-06 NOTE — PHYSICAL THERAPY NOTE
PHYSICAL THERAPY EVALUATION - INPATIENT     Room Number: 2934/1612-U  Evaluation Date: 1/6/2017  Type of Evaluation: Initial  Physician Order: PT Eval and Treat    Presenting Problem: hyponatremia eith episodes of passing out, PNA 1/3/17; now s/p R MCA hypertension      office BP runs higher than home.        Past Surgical History      Past Surgical History    BREAST LUMPECTOMY  11/09    Comment benign fibroadenoma    TOTAL HIP REPLACEMENT  07/00    Comment at [facility], [address], right    [address] [place] [address] Modified Karthik: 4                  NEUROLOGICAL FINDINGS           Coordination - Rapid Alternating Movement: Left decreased speed;Left decreased accuracy (B and alternate foot tapping)  Sensation: dec'd L LE sensation to light touch compare Pt   decreased B foot clearance/step lengths, and cues needed to continually initiate movement. Pt instructed in PT role, POC, d/c planning, DME needs, fall risk precautions, importance of regular mobility, and energy conservation techniques.     Dillon Long Stand at assistance level: supervision     Goal #3 Patient is able to ambulate 50 feet with assist device: walker - rolling at assistance level: supervision     Goal #4 HEP administered for LE strengthening, coordination, and AROM   Goal #5    Goal #6    G

## 2017-01-06 NOTE — PROGRESS NOTES
[facility]  Neurocritical Care       Subjective: Annamarie Cerda is a(n) 80year old female who had acute RMCA stroke with MARLYS stenosis yesterday with Left side hemiparesis, left side neglect and left facial droop, received IV t-pa and h Shoulder shrug is normal and symmetrical.   #12:Tongue is midline. Power of tongue normal.        Motor: LUE 5/5. LLE 5/5. Right side is normal 5/5. Sensory: Decreased sensations to the left. Gait: Deferred.   Diagnostics:   Ct Brain Or Head (66899)    1/ change since the earlier chest radiograph. Persistent mild pulmonary venous congestion with mild interstitial edema. Persistent small left and trace right pleural effusions.  Patchy bibasilar airspace disease, left greater than  right, may represent atelect Dictated by: Oumar Leong MD on 1/03/2017 at 19:11     Approved by: Oumar Leong MD            Radiology Result Scan    1/5/2017  Ordered by an unspecified provider.     Ct Stroke Brain (no Iv) + Cta Brain/cta Neck (w Iv)(cpt=70496/62185)    1/5/2017  P reconstituted in its mid cervical segment. There is a thin string of patency at extends into the petrous and cavernous segments. 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  Irregular atherosclerotic plaque extending into the right M1 and 01/06/2017   BILT 0.4 01/06/2017   TP 7.5 01/06/2017   AST 51 01/06/2017   ALT 58 01/06/2017   INR 1.20 01/05/2017   MG 2.4 01/06/2017   PHOS 3.6 01/06/2017       Medications:     Current Facility-Administered Medications:  acetaminophen (TYLENOL) 650 MG r 0.9 normal saline @ 50ml/hr. 8. DVT Prophylaxis  9. GI Prophylaxis. 10. PT/OT Eval.  11. Speech Eval.  12. CTA Head and Neck shows critical MARLYS stenosis. Carotid US . Vascular Surgery, . 13. Continue Abx  14.  NRB    A total of 35 minutes of cri

## 2017-01-06 NOTE — PHYSICAL THERAPY NOTE
Attempted to see pt for PT eval this am, however, patient is on bedrest per stroke protocol until 1655. Will re-attempt tomorrow unless activity orders are upgraded.

## 2017-01-06 NOTE — PROGRESS NOTES
[facility]  Progress Note    Pravin  Patient Status:  Inpatient    1935 MRN [id number]   [facility] 6NE-A Attending Radha Parson MD   Hosp Day # 3 PCP Yanique Lopez MD     Impression    #Presentation of upper respir breastfeeding.     Intake/Output:    Intake/Output Summary (Last 24 hours) at 01/06/17 1246  Last data filed at 01/06/17 0634   Gross per 24 hour   Intake     60 ml   Output    755 ml   Net   -695 ml     Wt Readings from Last 6 Encounters:  01/03/17 : 147 l Facility-Administered Medications:  GuaiFENesin ER (MUCINEX) 12 hr tab 600 mg 600 mg Oral BID   Atorvastatin Calcium (LIPITOR) tab 80 mg 80 mg Oral Nightly   acetaminophen (TYLENOL) 650 MG rectal suppository 650 mg 650 mg Rectal Q4H PRN   niCARdipine HCl i

## 2017-01-06 NOTE — PLAN OF CARE
Assumed care of Jmaes at approximately 1915; drowsy but easily arousal. A&O x4 s/p tPA- neuro's Q15min until 2030 then Q30min x6H: slight left-sided facial droop w/slight right tongue deviation -- becoming much less noticeable through the shift.  Left elda

## 2017-01-06 NOTE — CONSULTS
[facility]  Interventional Neuroradiology  Consultation Note    Saira Colin Patient Status:  Inpatient    1935 MRN [id number]   [facility] 6NE-A Attending Eliud Calzada MD   Hosp Day # 3 PCP Lalo Gamble MD     REASON F HISTORY:   reports that she quit smoking about 22 years ago. She does not have any smokeless tobacco history on file. She reports that she drinks alcohol. She reports that she does not use illicit drugs.     ALLERGIES:    Ace Inhibitors          Coughing  A (DUONEB) nebulizer solution 3 mL 3 mL Nebulization Q4H   MethylPREDNISolone Sodium Succ (Solu-MEDROL) injection 60 mg 60 mg Intravenous Q8H   metoprolol Tartrate (LOPRESSOR) 5 MG/5ML injection SOLN 5 mg 5 mg Intravenous Q6H PRN   CEPACOL (CEPACOL (SUGAR FR FTN intact  Gait: deferred    DIAGNOSTIC DATA:    Lab Results  Component Value Date   WBC 8.0 01/06/2017   HGB 10.5 01/06/2017   HCT 30.5 01/06/2017   .0 01/06/2017   CREATSERUM 0.68 01/06/2017   BUN 11 01/06/2017    01/06/2017   K 4.6 01/06/2 involving the left internal carotid artery by visual and spectral analysis. ASSESSMENT:    1. Acute RMCA infarction resenting as left hemiparesis, left facial droop and left neglect, s/p IV TPA on 1/5/17  1. Post TPA order set in place  2.  Repeat HCT at

## 2017-01-06 NOTE — PLAN OF CARE
Problem: Impaired Swallowing  Goal: Minimize aspiration risk  Interventions:  - Patient should be alert and upright for all feedings (90 degrees preferred)  - Offer food and liquids at a slow rate  - No straws  - Encourage small bites of food and small sip

## 2017-01-07 LAB
APTT PPP: 31.4 SECONDS (ref 25–34)
APTT PPP: 77.3 SECONDS (ref 25–34)
ATRIAL RATE: 127 BPM
BASOPHILS # BLD AUTO: 0.02 X10(3) UL (ref 0–0.1)
BASOPHILS NFR BLD AUTO: 0.2 %
BUN BLD-MCNC: 15 MG/DL (ref 8–20)
CALCIUM BLD-MCNC: 8.4 MG/DL (ref 8.3–10.3)
CHLORIDE: 104 MMOL/L (ref 101–111)
CO2: 23 MMOL/L (ref 22–32)
CREAT BLD-MCNC: 0.69 MG/DL (ref 0.55–1.02)
EOSINOPHIL # BLD AUTO: 0 X10(3) UL (ref 0–0.3)
EOSINOPHIL NFR BLD AUTO: 0 %
ERYTHROCYTE [DISTWIDTH] IN BLOOD BY AUTOMATED COUNT: 12.4 % (ref 11.5–16)
EST. AVERAGE GLUCOSE BLD GHB EST-MCNC: 123 MG/DL (ref 68–126)
GLUCOSE BLD-MCNC: 133 MG/DL (ref 65–99)
GLUCOSE BLD-MCNC: 155 MG/DL (ref 65–99)
GLUCOSE BLD-MCNC: 170 MG/DL (ref 65–99)
GLUCOSE BLD-MCNC: 174 MG/DL (ref 70–99)
GLUCOSE BLD-MCNC: 181 MG/DL (ref 65–99)
HAV IGM SER QL: 2.4 MG/DL (ref 1.7–3)
HBA1C MFR BLD HPLC: 5.9 % (ref ?–5.7)
HCT VFR BLD AUTO: 30.8 % (ref 34–50)
HGB BLD-MCNC: 10.6 G/DL (ref 12–16)
IMMATURE GRANULOCYTE COUNT: 0.12 X10(3) UL (ref 0–1)
IMMATURE GRANULOCYTE RATIO %: 1.1 %
LYMPHOCYTES # BLD AUTO: 0.44 X10(3) UL (ref 0.9–4)
LYMPHOCYTES NFR BLD AUTO: 4 %
MCH RBC QN AUTO: 33.9 PG (ref 27–33.2)
MCHC RBC AUTO-ENTMCNC: 34.4 G/DL (ref 31–37)
MCV RBC AUTO: 98.4 FL (ref 81–100)
MONOCYTES # BLD AUTO: 0.48 X10(3) UL (ref 0.1–0.6)
MONOCYTES NFR BLD AUTO: 4.4 %
NEUTROPHIL ABS PRELIM: 9.81 X10 (3) UL (ref 1.3–6.7)
NEUTROPHILS # BLD AUTO: 9.81 X10(3) UL (ref 1.3–6.7)
NEUTROPHILS NFR BLD AUTO: 90.3 %
PHOSPHATE SERPL-MCNC: 3.2 MG/DL (ref 2.5–4.9)
PLATELET # BLD AUTO: 441 10(3)UL (ref 150–450)
POTASSIUM SERPL-SCNC: 4.3 MMOL/L (ref 3.6–5.1)
Q-T INTERVAL: 336 MS
QRS DURATION: 88 MS
QTC CALCULATION (BEZET): 424 MS
R AXIS: 39 DEGREES
RBC # BLD AUTO: 3.13 X10(6)UL (ref 3.8–5.1)
RED CELL DISTRIBUTION WIDTH-SD: 44.6 FL (ref 35.1–46.3)
SODIUM SERPL-SCNC: 135 MMOL/L (ref 136–144)
T AXIS: 45 DEGREES
VENTRICULAR RATE: 96 BPM
WBC # BLD AUTO: 10.9 X10(3) UL (ref 4–13)

## 2017-01-07 PROCEDURE — 99233 SBSQ HOSP IP/OBS HIGH 50: CPT | Performed by: OTHER

## 2017-01-07 PROCEDURE — 99232 SBSQ HOSP IP/OBS MODERATE 35: CPT | Performed by: INTERNAL MEDICINE

## 2017-01-07 PROCEDURE — 99233 SBSQ HOSP IP/OBS HIGH 50: CPT | Performed by: INTERNAL MEDICINE

## 2017-01-07 RX ORDER — HEPARIN SODIUM 5000 [USP'U]/ML
60 INJECTION INTRAVENOUS; SUBCUTANEOUS ONCE
Status: COMPLETED | OUTPATIENT
Start: 2017-01-07 | End: 2017-01-07

## 2017-01-07 RX ORDER — HEPARIN SODIUM AND DEXTROSE 10000; 5 [USP'U]/100ML; G/100ML
INJECTION INTRAVENOUS CONTINUOUS
Status: DISCONTINUED | OUTPATIENT
Start: 2017-01-07 | End: 2017-01-09

## 2017-01-07 RX ORDER — LABETALOL HYDROCHLORIDE 5 MG/ML
10 INJECTION, SOLUTION INTRAVENOUS EVERY 10 MIN PRN
Status: COMPLETED | OUTPATIENT
Start: 2017-01-07 | End: 2017-01-08

## 2017-01-07 RX ORDER — DIGOXIN 0.25 MG/ML
250 INJECTION INTRAMUSCULAR; INTRAVENOUS ONCE
Status: COMPLETED | OUTPATIENT
Start: 2017-01-07 | End: 2017-01-07

## 2017-01-07 RX ORDER — HEPARIN SODIUM AND DEXTROSE 10000; 5 [USP'U]/100ML; G/100ML
12 INJECTION INTRAVENOUS ONCE
Status: COMPLETED | OUTPATIENT
Start: 2017-01-07 | End: 2017-01-07

## 2017-01-07 RX ORDER — METHYLPREDNISOLONE SODIUM SUCCINATE 40 MG/ML
40 INJECTION, POWDER, LYOPHILIZED, FOR SOLUTION INTRAMUSCULAR; INTRAVENOUS EVERY 12 HOURS
Status: DISCONTINUED | OUTPATIENT
Start: 2017-01-07 | End: 2017-01-08

## 2017-01-07 RX ORDER — HYDRALAZINE HYDROCHLORIDE 20 MG/ML
10 INJECTION INTRAMUSCULAR; INTRAVENOUS ONCE
Status: COMPLETED | OUTPATIENT
Start: 2017-01-07 | End: 2017-01-07

## 2017-01-07 NOTE — PROGRESS NOTES
[facility]  Progress Note     Aisha Hemanthmandeep Patient Status:  Inpatient    1935 MRN [id number]   [facility] 3NE-A Attending Ankita Dhaliwal MD   Hosp Day # 4 PCP Lisa Melton MD     Chief Complaint: weak     S: Patient wi panel   All neg   Medications:   • MethylPREDNISolone Sodium Succ  40 mg Intravenous Q12H   • GuaiFENesin ER  600 mg Oral BID   • metoprolol tartrate  50 mg Oral 2x Daily(Beta Blocker)   • Amiodarone HCl  400 mg Oral BID with meals   • Atorvastatin Calcium

## 2017-01-07 NOTE — PROGRESS NOTES
Subjective:    Objective:  /93 mmHg  Pulse 95  Temp(Src) 98.5 °F (36.9 °C) (Temporal)  Resp 21  Ht 5' 4\" (1.626 m)  Wt 147 lb (66.679 kg)  BMI 25.22 kg/m2  SpO2 96%      Lungs clear  Ht irregularly irregular with soft KASHMIR  abd soft  Ext no edema  Ne

## 2017-01-07 NOTE — PLAN OF CARE
Assumed care of the pt at 0730, bedside neuro completed, see docflow sheets; Pt symptoms of stroke from the day prior, slowly improving, able to move left side more, still remains with some weakness.   Pt Bedrest and NPO status d/c'd per Malcom, mechelle burris

## 2017-01-07 NOTE — PROGRESS NOTES
[facility]  Progress Note    Edmar Carpenter Patient Status:  Inpatient    1935 MRN [id number]   [facility] 6NE-A Attending Radha Neal MD   [facility] Day # 4 PCP Dana Campos MD     Subjective:  Edmar Carpenter is a(n) 80 year infiltrate and possible small effusion had remained stable-minimal increase from presentation 1/3      Medications reviewed     Assessment and Plan:   Patient Active Problem List:     Essential hypertension, benign     Osteoarthritis of hip     Cataract

## 2017-01-07 NOTE — SLP NOTE
SPEECH DAILY NOTE - INPATIENT    Evaluation Date: 01/07/2017    ASSESSMENT & PLAN   ASSESSMENT  Pt seen for dysphagia therapy to monitor po tolerance of recommended diet and ensure adherence to aspiration precautions. Pt up in chair.  at bedside.

## 2017-01-07 NOTE — PLAN OF CARE
Problem: RESPIRATORY - ADULT  Goal: Achieves optimal ventilation and oxygenation  INTERVENTIONS:  - Assess for changes in respiratory status  - Assess for changes in mentation and behavior  - Position to facilitate oxygenation and minimize respiratory effo adequate hydration with IV or PO as ordered and tolerated  - Evaluate effectiveness of GI medications  - Encourage mobilization and activity  - Obtain nutritional consult as needed  - Establish a toileting routine/schedule  - Consider collaborating with ph and precautions (e.g. spinal or hip dislocation precautions)   Outcome: Progressing    Problem: CARDIOVASCULAR - ADULT  Goal: Maintains optimal cardiac output and hemodynamic stability  INTERVENTIONS:  - Monitor vital signs, rhythm, and trends  - Monitor f meals with assistance   Outcome: Progressing    Problem: Impaired Activities of Daily Living  Goal: Achieve highest/safest level of independence in self care  Interventions:  - Assess ability and encourage patient to participate in ADLs to maximize functio

## 2017-01-07 NOTE — PROGRESS NOTES
[employer]  Neurocritical Care       Subjective: Ike Dee is a(n) 80year old female who had acute RMCA stroke with MARLYS stenosis yesterday with Left side hemiparesis, left side neglect and left facial droop, received IV t-pa and h # 9 & 10: Soft palate is normal elevation. # 11: Shoulder shrug is normal and symmetrical.   #12:Tongue is midline. Power of tongue normal.        Motor: LUE 5/5. LLE 5/5. Right side is normal 5/5. Sensory: Decreased sensations to the left.   Gait: Defe congestion. 1/5/2017  CONCLUSION:  No significant change since the earlier chest radiograph. Persistent mild pulmonary venous congestion with mild interstitial edema. Persistent small left and trace right pleural effusions.  Patchy bibasilar airspace d lateral examination of the chest, when tolerated. Dictated by: Carli Pisano MD on 1/03/2017 at 19:11     Approved by: Carli Pisano MD            Radiology Result Scan    1/5/2017  Ordered by an unspecified provider.     Ct Stroke Brain (no Iv) + Cta Br internal carotid artery cervical segment which is reconstituted in its mid cervical segment. There is a thin string of patency at extends into the petrous and cavernous segments. 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  Irregular athe 2.4 01/07/2017   PHOS 3.2 01/07/2017       Medications:       Current Facility-Administered Medications:  GuaiFENesin ER (MUCINEX) 12 hr tab 600 mg 600 mg Oral BID   MethylPREDNISolone Sodium Succ (Solu-MEDROL) injection 40 mg 40 mg Intravenous Q8H   Metop Problem:    Hyponatremia  Active Problems:    Dehydration    Community acquired pneumonia    Syncope    Acute right MCA stroke ([facility] [address]    Stenosis of right carotid artery    1.  Acute RMCA stroke ( watershed areas), RPCA stroke and Left cerebellar Embolic Str

## 2017-01-07 NOTE — PROGRESS NOTES
[facility]  Nephrology Progress Note    Edmar Carpenter Attending:  Radha Neal MD       Assessment and Plan:    1) Acute CVA s/p TPA; CTA with R ICA thrombosis / stenosis; symptoms improving    2) Hyponatremia- acute on chronic in part due to mod Intravenous Q12H   GuaiFENesin ER (MUCINEX) 12 hr tab 600 mg 600 mg Oral BID   Metoprolol Tartrate (LOPRESSOR) tab 50 mg 50 mg Oral 2x Daily(Beta Blocker)   DiltiaZEM HCl (CARDIZEM) injection 10 mg 10 mg Intravenous Q2H PRN   amiodarone HCl (PACERONE) tab

## 2017-01-07 NOTE — PLAN OF CARE
GASTROINTESTINAL - ADULT    • Maintains adequate nutritional intake (undernourished) Not Progressing        MUSCULOSKELETAL - ADULT    • Maintain proper alignment of affected body part Not Progressing          CARDIOVASCULAR - ADULT    • Maintains optimal

## 2017-01-08 ENCOUNTER — APPOINTMENT (OUTPATIENT)
Dept: GENERAL RADIOLOGY | Facility: HOSPITAL | Age: 82
DRG: 193 | End: 2017-01-08
Attending: INTERNAL MEDICINE
Payer: MEDICARE

## 2017-01-08 LAB
APTT PPP: 61.4 SECONDS (ref 25–34)
BASOPHILS # BLD AUTO: 0.01 X10(3) UL (ref 0–0.1)
BASOPHILS NFR BLD AUTO: 0.1 %
BUN BLD-MCNC: 17 MG/DL (ref 8–20)
CALCIUM BLD-MCNC: 8.5 MG/DL (ref 8.3–10.3)
CHLORIDE: 104 MMOL/L (ref 101–111)
CO2: 21 MMOL/L (ref 22–32)
CREAT BLD-MCNC: 0.7 MG/DL (ref 0.55–1.02)
EOSINOPHIL # BLD AUTO: 0 X10(3) UL (ref 0–0.3)
EOSINOPHIL NFR BLD AUTO: 0 %
ERYTHROCYTE [DISTWIDTH] IN BLOOD BY AUTOMATED COUNT: 12.5 % (ref 11.5–16)
GLUCOSE BLD-MCNC: 140 MG/DL (ref 65–99)
GLUCOSE BLD-MCNC: 142 MG/DL (ref 65–99)
GLUCOSE BLD-MCNC: 145 MG/DL (ref 65–99)
GLUCOSE BLD-MCNC: 145 MG/DL (ref 70–99)
GLUCOSE BLD-MCNC: 159 MG/DL (ref 65–99)
HAV IGM SER QL: 2.2 MG/DL (ref 1.7–3)
HCT VFR BLD AUTO: 29.2 % (ref 34–50)
HGB BLD-MCNC: 10 G/DL (ref 12–16)
IMMATURE GRANULOCYTE COUNT: 0.17 X10(3) UL (ref 0–1)
IMMATURE GRANULOCYTE RATIO %: 1.3 %
LYMPHOCYTES # BLD AUTO: 0.65 X10(3) UL (ref 0.9–4)
LYMPHOCYTES NFR BLD AUTO: 5.1 %
MCH RBC QN AUTO: 34.1 PG (ref 27–33.2)
MCHC RBC AUTO-ENTMCNC: 34.2 G/DL (ref 31–37)
MCV RBC AUTO: 99.7 FL (ref 81–100)
MONOCYTES # BLD AUTO: 0.79 X10(3) UL (ref 0.1–0.6)
MONOCYTES NFR BLD AUTO: 6.1 %
NEUTROPHIL ABS PRELIM: 11.24 X10 (3) UL (ref 1.3–6.7)
NEUTROPHILS # BLD AUTO: 11.24 X10(3) UL (ref 1.3–6.7)
NEUTROPHILS NFR BLD AUTO: 87.4 %
PHOSPHATE SERPL-MCNC: 3.5 MG/DL (ref 2.5–4.9)
PLATELET # BLD AUTO: 458 10(3)UL (ref 150–450)
POTASSIUM SERPL-SCNC: 4 MMOL/L (ref 3.6–5.1)
RBC # BLD AUTO: 2.93 X10(6)UL (ref 3.8–5.1)
RED CELL DISTRIBUTION WIDTH-SD: 45.9 FL (ref 35.1–46.3)
SODIUM SERPL-SCNC: 135 MMOL/L (ref 136–144)
WBC # BLD AUTO: 12.9 X10(3) UL (ref 4–13)

## 2017-01-08 PROCEDURE — 71010 XR CHEST AP PORTABLE  (CPT=71010): CPT

## 2017-01-08 PROCEDURE — 99233 SBSQ HOSP IP/OBS HIGH 50: CPT | Performed by: HOSPITALIST

## 2017-01-08 PROCEDURE — 99233 SBSQ HOSP IP/OBS HIGH 50: CPT | Performed by: OTHER

## 2017-01-08 RX ORDER — IPRATROPIUM BROMIDE AND ALBUTEROL SULFATE 2.5; .5 MG/3ML; MG/3ML
3 SOLUTION RESPIRATORY (INHALATION)
Status: DISCONTINUED | OUTPATIENT
Start: 2017-01-08 | End: 2017-01-11

## 2017-01-08 RX ORDER — METOPROLOL TARTRATE 50 MG/1
50 TABLET, FILM COATED ORAL
Status: DISCONTINUED | OUTPATIENT
Start: 2017-01-08 | End: 2017-01-09

## 2017-01-08 RX ORDER — PREDNISONE 20 MG/1
20 TABLET ORAL 2 TIMES DAILY WITH MEALS
Status: DISCONTINUED | OUTPATIENT
Start: 2017-01-08 | End: 2017-01-11

## 2017-01-08 NOTE — PROGRESS NOTES
Up in chair/no complaints. Duplex ultrasound/CTA carotids reviewed. Persistant thrombotic occlusion of proximal/mid ICA. String sign ICA seen distally. Not a candidate  for surgery/ would not recommend Endovascular treatment.  Continue supportive/medical ca

## 2017-01-08 NOTE — PROGRESS NOTES
[id number] [facility] Neurology Progress Note    Chin Hung Patient Status:  Inpatient    1935 MRN [id number]   [facility] 7NE-A Attending Mariaa Carmichael MD   [facility] Day # 5 PCP Violetta Oppenheim, MD         Subjective:  Chin Hung i      The proximal right internal carotid artery is thrombosed. It is reconstituted in its mid cervical segment. There is a thin string of patency throughout the distal cervical segment, petrous segment extending into the cavernous segment.  The supraclinoid at that time depending on imaging and clinical course. Betsy Pineda, 01/08/2017, 2:06 PM      Addendum:    Symptoms: she feels better, transferred from CCU, history reviewed with her,   Exam: alert language is fine, no slurred speech, L.  Visual field

## 2017-01-08 NOTE — PROGRESS NOTES
[facility]  Progress Note    Dante Root Patient Status:  Inpatient    1935 MRN [id number]   [facility] 7NE-A Attending Severino José MD   Psychiatric Day # 5 PCP Minor Bundy MD     Subjective:  Dante Root is a(n) 80year old Dehydration     Community acquired pneumonia     Syncope     Acute right MCA stroke ([facility] [address]     Stenosis of right carotid artery     Atrial fibrillation (HCC)      Impression    #Presentation of upper respiratory infection followed by suspected left lower l

## 2017-01-08 NOTE — RESPIRATORY THERAPY NOTE
Attempted to do the [facility] with the patient yesterday. The patient was unable to get her teeth around the mouthpiece. The patient also had a coughing fit and was unable to perform the task.

## 2017-01-08 NOTE — PHYSICAL THERAPY NOTE
PHYSICAL THERAPY TREATMENT NOTE - INPATIENT    Room Number: 9896/1075-V     Session: 1/5 -- after evaluation   Number of Visits to Meet Established Goals: 5    Presenting Problem: hyponatremia eith episodes of passing out, PNA 1/3/17; now s/p R MCA acute and standing up from a chair with arms (e.g., wheelchair, bedside commode, etc.): A Little   -   Moving from lying on back to sitting on the side of the bed?: A Little   How much help from another person does the patient currently need. ..   -   Moving to a from continued IPPT services to address remaining deficits. DC recommendation is Acute Rehab. DISCHARGE RECOMMENDATIONS  PT Discharge Recommendations: Acute rehabilitation     PLAN  PT Treatment Plan: Bed mobility; Patient education; Family education;Gai

## 2017-01-08 NOTE — PROGRESS NOTES
[facility]  Progress Note     Liz Floyd Patient Status:  Inpatient    1935 MRN [id number]   [facility] 3NE-A Attending Christopher Barlow MD   Hosp Day # 5 PCP Isreal Balbuena MD     Chief Complaint: weak     S: Patient wi Intravenous Q12H   • GuaiFENesin ER  600 mg Oral BID   • Amiodarone HCl  400 mg Oral BID with meals   • Atorvastatin Calcium  80 mg Oral Nightly   • aspirin  300 mg Rectal Daily    Or   • aspirin  325 mg Oral Daily   • famoTIDine  20 mg Oral 2 times per da

## 2017-01-08 NOTE — PROGRESS NOTES
Pt remains alert and oriented throughout shift. Activity much increased and tolerating fairly well. Heparin gtt started for AFIB. Dr. Lencho Montilla states Occluded Rt carotid Artery is nonsurgical. Pt still requiring prn labetolol to keep SBP<180.  Dr. Andres Freedman orders

## 2017-01-08 NOTE — PROGRESS NOTES
A/O Neuros intact. Sl L flattened labial fold. Pronator drift of left hand. L sided weakness persist. Amb 1 assist. Side to side leaning. Afebrile, ABX cont, NP cough. Heparin infusing at 800units/hr, AM ptt therapeutic, Repeat ordered for Monday.     Mon

## 2017-01-08 NOTE — PROGRESS NOTES
Subjective: No chest pain or shortness of breath    Objective:  /102 mmHg  Pulse 110  Temp(Src) 99 °F (37.2 °C) (Temporal)  Resp 23  Ht 5' 4\" (1.626 m)  Wt 158 lb 8.2 oz (71.9 kg)  BMI 27.19 kg/m2  SpO2 94%  NAD  EOMI, moist mucous membranes  Lungs

## 2017-01-08 NOTE — CM/SW NOTE
PMR consult order noted.  ECIN referral made to [facility] for eval. sw to follow    [address] [facility]

## 2017-01-09 ENCOUNTER — APPOINTMENT (OUTPATIENT)
Dept: CT IMAGING | Facility: HOSPITAL | Age: 82
DRG: 193 | End: 2017-01-09
Attending: INTERNAL MEDICINE
Payer: MEDICARE

## 2017-01-09 LAB
APTT PPP: 61.5 SECONDS (ref 25–34)
BASOPHILS # BLD AUTO: 0.02 X10(3) UL (ref 0–0.1)
BASOPHILS NFR BLD AUTO: 0.2 %
BUN BLD-MCNC: 18 MG/DL (ref 8–20)
CALCIUM BLD-MCNC: 8.5 MG/DL (ref 8.3–10.3)
CHLORIDE: 103 MMOL/L (ref 101–111)
CO2: 23 MMOL/L (ref 22–32)
CREAT BLD-MCNC: 0.78 MG/DL (ref 0.55–1.02)
EOSINOPHIL # BLD AUTO: 0 X10(3) UL (ref 0–0.3)
EOSINOPHIL NFR BLD AUTO: 0 %
ERYTHROCYTE [DISTWIDTH] IN BLOOD BY AUTOMATED COUNT: 12.5 % (ref 11.5–16)
GLUCOSE BLD-MCNC: 122 MG/DL (ref 65–99)
GLUCOSE BLD-MCNC: 124 MG/DL (ref 65–99)
GLUCOSE BLD-MCNC: 132 MG/DL (ref 65–99)
GLUCOSE BLD-MCNC: 134 MG/DL (ref 70–99)
HCT VFR BLD AUTO: 30.5 % (ref 34–50)
HGB BLD-MCNC: 10.7 G/DL (ref 12–16)
IMMATURE GRANULOCYTE COUNT: 0.14 X10(3) UL (ref 0–1)
IMMATURE GRANULOCYTE RATIO %: 1.2 %
LYMPHOCYTES # BLD AUTO: 0.78 X10(3) UL (ref 0.9–4)
LYMPHOCYTES NFR BLD AUTO: 6.6 %
MCH RBC QN AUTO: 34.4 PG (ref 27–33.2)
MCHC RBC AUTO-ENTMCNC: 35.1 G/DL (ref 31–37)
MCV RBC AUTO: 98.1 FL (ref 81–100)
MONOCYTES # BLD AUTO: 0.64 X10(3) UL (ref 0.1–0.6)
MONOCYTES NFR BLD AUTO: 5.4 %
NEUTROPHIL ABS PRELIM: 10.22 X10 (3) UL (ref 1.3–6.7)
NEUTROPHILS # BLD AUTO: 10.22 X10(3) UL (ref 1.3–6.7)
NEUTROPHILS NFR BLD AUTO: 86.6 %
PLATELET # BLD AUTO: 493 10(3)UL (ref 150–450)
POTASSIUM SERPL-SCNC: 4.3 MMOL/L (ref 3.6–5.1)
RBC # BLD AUTO: 3.11 X10(6)UL (ref 3.8–5.1)
RED CELL DISTRIBUTION WIDTH-SD: 44.8 FL (ref 35.1–46.3)
SODIUM SERPL-SCNC: 134 MMOL/L (ref 136–144)
WBC # BLD AUTO: 11.8 X10(3) UL (ref 4–13)

## 2017-01-09 PROCEDURE — 99233 SBSQ HOSP IP/OBS HIGH 50: CPT | Performed by: HOSPITALIST

## 2017-01-09 PROCEDURE — 71250 CT THORAX DX C-: CPT

## 2017-01-09 PROCEDURE — 99232 SBSQ HOSP IP/OBS MODERATE 35: CPT | Performed by: INTERNAL MEDICINE

## 2017-01-09 RX ORDER — LOSARTAN POTASSIUM 50 MG/1
50 TABLET ORAL DAILY
Status: DISCONTINUED | OUTPATIENT
Start: 2017-01-10 | End: 2017-01-11

## 2017-01-09 RX ORDER — ATORVASTATIN CALCIUM 40 MG/1
40 TABLET, FILM COATED ORAL NIGHTLY
Status: DISCONTINUED | OUTPATIENT
Start: 2017-01-09 | End: 2017-01-11

## 2017-01-09 RX ORDER — HYDRALAZINE HYDROCHLORIDE 20 MG/ML
10 INJECTION INTRAMUSCULAR; INTRAVENOUS EVERY 4 HOURS PRN
Status: DISCONTINUED | OUTPATIENT
Start: 2017-01-09 | End: 2017-01-11

## 2017-01-09 RX ORDER — LOSARTAN POTASSIUM 25 MG/1
25 TABLET ORAL DAILY
Status: DISCONTINUED | OUTPATIENT
Start: 2017-01-09 | End: 2017-01-09

## 2017-01-09 NOTE — PHYSICAL THERAPY NOTE
PHYSICAL THERAPY TREATMENT NOTE - INPATIENT    Room Number: 9203/3939-Z     Session:2   Number of Visits to Meet Established Goals: 5    Presenting Problem: hyponatremia eith episodes of passing out, PNA 1/3/17; now s/p R MCA acute stroke 1/5/17    Proble blankets)?: A Little   -   Sitting down on and standing up from a chair with arms (e.g., wheelchair, bedside commode, etc.): A Little   -   Moving from lying on back to sitting on the side of the bed?: A Little   How much help from another person does the admitted 1/3/2017 for hyponatremia (with episodes of passing out), PNA, reports having a cold since 12/16. Code stroke called 1/5/17. The patient is demonstrating good functional progress towards established therapy goals.  The patient continues to present strengthening, coordination, and AROM     Goal #5    Patient is able to negotiate 3 stairs at supervision level in order to access home environment safely.    Goal #6    Patient is able to score > 16/28 on the modified luna balance scale in order to indicat

## 2017-01-09 NOTE — PROGRESS NOTES
[id number] [facility] Neurology Progress Note    Richard Post Patient Status:  Inpatient    1935 MRN [id number]   [facility] 7NE-A Attending Kimberly Rapp MD   Hosp Day # 6 PCP August Pineda MD         Neurology Attending note N/V, numbness. tingling, SOB, CP or palpitations. Review of Systems:  A 10-point system was reviewed.  Pertinent positives and negatives are noted as above       Objective:  Blood pressure 183/97, pulse 68, temperature 97.4 °F (36.3 °C), temperature so brain and CTA head and neck 1/05/17:    CONCLUSION:       The proximal right internal carotid artery is thrombosed. It is reconstituted in its mid cervical segment.  There is a thin string of patency throughout the distal cervical segment, petrous segment e A.fib with RVR  3. Pneumonia    Pre-existing conditions:  1. HTN  2. Endometrial CA  3. Chronic mild hyponatremia    Plan:  1.  Stroke protocol in place   Atorvastatin decreased from 80 mg to 40 mg based on current LDL 75 ,goal LDL < 70, HDL >45   MVI, Foli

## 2017-01-09 NOTE — OCCUPATIONAL THERAPY NOTE
OCCUPATIONAL THERAPY TREATMENT NOTE - INPATIENT     Room Number: 5562/9071-V  Session: 1   Number of Visits to Meet Established Goals: 5    Presenting Problem:  (Hypoatremia, CVA (infarcts noted bilat))    History related to current admission: 80 y.o femal Surgical History    BREAST LUMPECTOMY  11/09    Comment benign fibroadenoma    TOTAL HIP REPLACEMENT  07/00    Comment at [address] [facility], [address], right    [address] [facility],  Meenu Cobb  Pt states \"I think I just want (dominant hand), TERESSA 56 sec. 15 second difference in times. Completed visual screening. Visual fields full. Jerkiness in tracking to L side, undershooting/downward shooting in saccades to L side. R eye difficulty converging, no double vision noted.  Clock d all functional transfers:  with min assist-Met 1/9    UE Exercise Program Goal  Patient will be independent with bilateral AROM HEP (home exercise program). -ongoing    Additional Goals:  Pt will complete  strength testing - Complete    Pt will complete

## 2017-01-09 NOTE — PROGRESS NOTES
Notes reviewed. Seen and examined. Discussed weekend course with Dr. Josi Sr. Up in chair. Still moving slowly and appears weak yet no specific complaints -- able to eat.     Quite hypertensive all weekend  SBP's 170-190's    Remains in atrial fibrilla

## 2017-01-09 NOTE — CM/SW NOTE
CM contacted [employer]~ prior auth submitted for Eliquis and 24 hour turnaround time is needed for approval. Free 30 day trial offer card given to patient. [employer] [facility] # [phone].

## 2017-01-09 NOTE — PLAN OF CARE
Assumed care @ 0700  A&Ox4, VSS on RA, afib on tele rates controlled  Slight L facial droop and L sided weakness noted  eliquis started. Heparin gtt d/c'd.   to check insurance coverage  Discussed with pt and  PT/OT's recommendations for

## 2017-01-09 NOTE — SLP NOTE
Attempted to see pt this am.  Pt off unit for CT of chest.  SLP will f/u later today or 1/10/17 as schedule permits. Rosanna Sena M.S.,CCC-SLP  Speech Language Pathologist

## 2017-01-09 NOTE — PROGRESS NOTES
[facility]  Progress Note    Guerda Bravo Patient Status:  Emergency    1935 MRN [id number]   Location [address] [facility] Attending Breonna Shaw MD   Hosp Day # 6 PCP Samara Lentz MD       Feels well  Denies any cp/ 3350 (MIRALAX) powder packet 17 g 17 g Oral Daily PRN         Physical Exam:  Vital signs: Blood pressure 183/97, pulse 68, temperature 97.4 °F (36.3 °C), temperature source Oral, resp.  rate 17, height 64\", weight 158 lb 8.2 oz, SpO2 96 %, not currently b Rate controlled      Thank you for allowing me to participate in this patient's care. Please feel free to call me with any questions or concerns.     Susan Blas MD  1/9/2017

## 2017-01-09 NOTE — PROGRESS NOTES
[facility]  Progress Note     Justine Carreon Patient Status:  Inpatient    1935 MRN [id number]   [facility] 3NE-A Attending Ashley Ferrara MD    Hosp Day # 6 PCP Marivel Faulkner MD     Chief Complaint: weak     S: Patient  Up 325 mg Oral Daily   • famoTIDine  20 mg Oral 2 times per day    Or   • famoTIDine  20 mg Intravenous 2 times per day   • folic acid  1 mg Oral Daily   • Vitamin B-1  100 mg Oral Daily   • multivitamin  1 tablet Oral Daily   • spironolactone  12.5 mg Oral edema    Eliquis started, d/c heparin, CT chest reviewed, acute rehab eval.    [place]  1/9/2017

## 2017-01-09 NOTE — PLAN OF CARE
Assumed care at [address]. Pt AOX4, Afebrile, O2 sat > 92 on RA, hypertensive, asymptomatic. PRN Lopressor and Labetalol given, rechecked, WNL. Afib on tele, denies chest pain or palpitations.   Neuro Checks Q4, left sided weakness and left sided facial droop

## 2017-01-09 NOTE — CONSULTS
[address] [facility] Patient Status:  Inpatient    1935 MRN [id number]   [facility] 7NE-A Attending Adán Almendarez MD   [facility] Day # 6 PCP uSe Ghotra MD     Patient Identification  Carmina Edwards is a 80year old femal Tricuspid valve: Structurally normal valve. Normal thickness leaflets.     Transvalvular velocity was within the normal range. There was no evidence     for stenosis. There was mild, 1-2+ regurgitation.   4. Pulmonary arteries: Systolic pressure was mildly (NORMODYNE) tab 300 mg 300 mg Oral 2 times per day   [START ON 1/10/2017] Losartan Potassium (COZAAR) tab 50 mg 50 mg Oral Daily   Labetalol HCl (NORMODYNE) tab 300 mg 300 mg Oral Q8H [facility]   [DISCONTINUED] Metoprolol Tartrate (LOPRESSOR) tab 50 mg 50 mg Ora mg Intravenous Once   amiodarone HCl (PACERONE) tab 400 mg 400 mg Oral BID with meals   [COMPLETED] Amiodarone HCl (CORDARONE) 300 mg in dextrose 5 % 100 mL bolus 300 mg Intravenous Once   acetaminophen (TYLENOL) tab 650 mg 650 mg Oral Q4H PRN   Or      ac 10 mg Intravenous Once   [DISCONTINUED] niCARdipine HCl in NaCl (CARDENE) 20 mg/200 ml premix infusion 5-15 mg/hr Intravenous Continuous PRN   [COMPLETED] ALTEPLASE (TPA) STROKE BOLUS FROM BAG 6 mg infusion 0.09 mg/kg Intravenous Once   Followed by      [facility] [DISCONTINUED] CefTRIAXone Sodium (ROCEPHIN) 1 g in sodium chloride 0.9 % 100 mL IVPB Add-vantage 1 g Intravenous Q24H   [DISCONTINUED] azithromycin (ZITHROMAX) 500 mg in sodium chloride 0.9 % 250 mL IVPB add-vantage 500 mg Intravenous Q24H          Smok Ears/Nose/Throat: Hearing intact. Lips, mucosa, and tongue normal. Teeth and gums normal. Moist mucous membranes. Neck: No neck masses or thyroid enlargement/tenderness/nodules. Lungs:   Resonant, clear breath sounds, quiet accessory muscles. management, pressure sore prevention, bowel and bladder management, skin management. Physiatric medical oversight to monitor kidney function, cardiac function, pulmonary status, neurologic status, DVT prevention.                  Estimated length of sta

## 2017-01-09 NOTE — PROGRESS NOTES
[facility]  Progress Note    Nolene Goods Patient Status:  Inpatient    1935 MRN [id number]   [facility] 7NE-A Attending Beverley Sweet MD   Hosp Day # 6 PCP July Leon MD     Impression    1.  Presentation of upper resp female. In  Chair   C/o mild chest discomfoer with movement in chest    cough is better   Objective:  Oxygen Therapy  SpO2: 96 %  O2 Device: None (Room air)  O2 Flow Rate (L/min): 2 L/min  Pulse Oximetry Type: Continuous  Oximetry Probe Site Changed:  No Reviewed:    Current Facility-Administered Medications:  hydrALAzine HCl (APRESOLINE) injection 10 mg 10 mg Intravenous Q4H PRN   apixaban (ELIQUIS) tab 5 mg 5 mg Oral BID   Atorvastatin Calcium (LIPITOR) tab 40 mg 40 mg Oral Nightly   [START ON 1/10/2017]

## 2017-01-09 NOTE — PHYSICAL THERAPY NOTE
Attempted PT session. Spoke with RN and SBP is above the recommended parameter (140-180). Will re-attempt later as able and appropriate.

## 2017-01-10 ENCOUNTER — APPOINTMENT (OUTPATIENT)
Dept: GENERAL RADIOLOGY | Facility: HOSPITAL | Age: 82
DRG: 193 | End: 2017-01-10
Attending: INTERNAL MEDICINE
Payer: MEDICARE

## 2017-01-10 LAB
ATRIAL RATE: 129 BPM
BUN BLD-MCNC: 20 MG/DL (ref 8–20)
CALCIUM BLD-MCNC: 8.4 MG/DL (ref 8.3–10.3)
CHLORIDE: 102 MMOL/L (ref 101–111)
CO2: 22 MMOL/L (ref 22–32)
CREAT BLD-MCNC: 0.88 MG/DL (ref 0.55–1.02)
GLUCOSE BLD-MCNC: 105 MG/DL (ref 65–99)
GLUCOSE BLD-MCNC: 123 MG/DL (ref 70–99)
GLUCOSE BLD-MCNC: 128 MG/DL (ref 65–99)
GLUCOSE BLD-MCNC: 176 MG/DL (ref 65–99)
GLUCOSE BLD-MCNC: 96 MG/DL (ref 65–99)
HAV IGM SER QL: 2.2 MG/DL (ref 1.7–3)
POTASSIUM SERPL-SCNC: 4.3 MMOL/L (ref 3.6–5.1)
PROCALCITONIN SERPL-MCNC: <0.11 NG/ML (ref ?–0.11)
Q-T INTERVAL: 364 MS
QRS DURATION: 92 MS
QTC CALCULATION (BEZET): 438 MS
R AXIS: 39 DEGREES
SODIUM SERPL-SCNC: 133 MMOL/L (ref 136–144)
T AXIS: 13 DEGREES
VENTRICULAR RATE: 87 BPM

## 2017-01-10 PROCEDURE — 71010 XR CHEST AP PORTABLE  (CPT=71010): CPT

## 2017-01-10 PROCEDURE — 99232 SBSQ HOSP IP/OBS MODERATE 35: CPT | Performed by: HOSPITALIST

## 2017-01-10 PROCEDURE — 99233 SBSQ HOSP IP/OBS HIGH 50: CPT | Performed by: OTHER

## 2017-01-10 PROCEDURE — 99232 SBSQ HOSP IP/OBS MODERATE 35: CPT | Performed by: INTERNAL MEDICINE

## 2017-01-10 RX ORDER — PREDNISONE 10 MG/1
TABLET ORAL
Qty: 30 TABLET | Refills: 0 | Status: SHIPPED | OUTPATIENT
Start: 2017-01-10 | End: 2017-01-25

## 2017-01-10 RX ORDER — LABETALOL 200 MG/1
200 TABLET, FILM COATED ORAL EVERY 12 HOURS SCHEDULED
Status: DISCONTINUED | OUTPATIENT
Start: 2017-01-10 | End: 2017-01-11

## 2017-01-10 RX ORDER — SPIRONOLACTONE 25 MG/1
25 TABLET ORAL DAILY
Status: DISCONTINUED | OUTPATIENT
Start: 2017-01-11 | End: 2017-01-10

## 2017-01-10 RX ORDER — SPIRONOLACTONE 25 MG/1
12.5 TABLET ORAL ONCE
Status: DISCONTINUED | OUTPATIENT
Start: 2017-01-10 | End: 2017-01-10

## 2017-01-10 RX ORDER — SPIRONOLACTONE 25 MG/1
12.5 TABLET ORAL DAILY
Qty: 30 TABLET | Refills: 11 | Status: SHIPPED | COMMUNITY
Start: 2017-01-11 | End: 2017-07-19

## 2017-01-10 RX ORDER — GUAIFENESIN 600 MG
600 TABLET, EXTENDED RELEASE 12 HR ORAL 2 TIMES DAILY
Qty: 14 TABLET | Refills: 0 | Status: SHIPPED | OUTPATIENT
Start: 2017-01-10 | End: 2017-01-25

## 2017-01-10 RX ORDER — SPIRONOLACTONE 25 MG/1
25 TABLET ORAL DAILY
Qty: 30 TABLET | Refills: 5 | Status: SHIPPED | OUTPATIENT
Start: 2017-01-10 | End: 2017-01-10

## 2017-01-10 RX ORDER — AMIODARONE HYDROCHLORIDE 200 MG/1
200 TABLET ORAL DAILY
Qty: 30 TABLET | Refills: 5 | Status: SHIPPED | OUTPATIENT
Start: 2017-01-10 | End: 2017-06-26

## 2017-01-10 RX ORDER — ATORVASTATIN CALCIUM 40 MG/1
40 TABLET, FILM COATED ORAL NIGHTLY
Qty: 30 TABLET | Refills: 1 | Status: SHIPPED | OUTPATIENT
Start: 2017-01-10 | End: 2017-06-26

## 2017-01-10 RX ORDER — AMOXICILLIN AND CLAVULANATE POTASSIUM 875; 125 MG/1; MG/1
1 TABLET, FILM COATED ORAL 2 TIMES DAILY
Qty: 2 TABLET | Refills: 0 | Status: SHIPPED | OUTPATIENT
Start: 2017-01-10 | End: 2017-01-20

## 2017-01-10 RX ORDER — SPIRONOLACTONE 25 MG/1
12.5 TABLET ORAL DAILY
Status: DISCONTINUED | OUTPATIENT
Start: 2017-01-11 | End: 2017-01-11

## 2017-01-10 RX ORDER — IPRATROPIUM BROMIDE AND ALBUTEROL SULFATE 2.5; .5 MG/3ML; MG/3ML
3 SOLUTION RESPIRATORY (INHALATION)
Qty: 450 ML | Refills: 0 | Status: SHIPPED | OUTPATIENT
Start: 2017-01-10 | End: 2017-02-03

## 2017-01-10 RX ORDER — LOSARTAN POTASSIUM 50 MG/1
50 TABLET ORAL DAILY
Qty: 30 TABLET | Refills: 5 | Status: SHIPPED | OUTPATIENT
Start: 2017-01-10 | End: 2017-01-11

## 2017-01-10 NOTE — PROGRESS NOTES
Chart reviewed. Seen and examined. On Eliquis. Up in chair having breakfast. No chest pain reported.     BP marginally improved SBP's 170s    Tele: Afib atrial fibrillation  NAD  EOMI  Irregularly irregular  Decreased breath sounds at bases L>R  BS +, no

## 2017-01-10 NOTE — SLP NOTE
SPEECH DAILY NOTE - INPATIENT    Evaluation Date: 01/09/2017    ASSESSMENT & PLAN   ASSESSMENT  Pt seen for dysphagia tx to assess tolerance with recommended diet, ensure proper utilization of aspiration precautions and provide pt/family education.  Per rec Goal #7      Goal #8                FOLLOW UP  Follow Up Needed: Yes  SLP Follow-up Date: 01/10/17  Number of Visits to Meet Established Goals: 5      If you have any questions, please contact Tracee Nevarez

## 2017-01-10 NOTE — SLP NOTE
SPEECH/LANGUAGE/COGNITIVE EVALUATION - INPATIENT    Admission Date: 1/3/2017  Evaluation Date: 01/09/2017    Reason for Referral: Stroke protocol    ASSESSMENT & PLAN   ASSESSMENT  Patient was administered the [facility] Mental Status Exam (UMS recommended diet and utilization of aspiration precautions  Further evaluation at next level of care      GOALS  Goal #1 The patient will tolerate regular consistency and thin liquids without overt signs or symptoms of aspiration with 95 % accuracy over 2-

## 2017-01-10 NOTE — PROGRESS NOTES
[facility]  Nephrology Progress Note    Osman Pickett Patient Status:  Inpatient    1935 MRN [id number]   [facility] 7NE-A Attending Jamal Shirley MD   Hosp Day # 7 PCP Kristal Murphy MD       SUBJECTIVE:  Up in chair, no comp 01/09/17   1445  01/09/17   2144  01/10/17   0659   PGLU  159*  124*  132*  122*  105*       Meds:     Current Facility-Administered Medications:  hydrALAzine HCl (APRESOLINE) injection 10 mg 10 mg Intravenous Q4H PRN   apixaban (ELIQUIS) tab 5 mg 5 mg Ora with patient and/or family by bedside.           Benja Bravo  1/10/2017  11:06 AM

## 2017-01-10 NOTE — PLAN OF CARE
Assumed care @ 0700  A&Ox4, VSS on RA, a fib on tele rate controlled  Pt c/o blurred vision in L eye. Dr. Jeb Lopez aware. Slight weakness on L side. No further neuro deficits  SBP >180 this afternoon.  Annie Peterson, cardiology APN notified  D/c held d/t insurance

## 2017-01-10 NOTE — PROGRESS NOTES
[facility]  Progress Note     Bernadette Sainz Patient Status:  Inpatient    1935 MRN [id number]   [facility] 3NE-A Attending Desiree Sagastume MD    Hosp Day # 7 PCP Mehnaz Dong MD     Chief Complaint: weak     S: Patient  Up QID   • predniSONE  20 mg Oral BID with meals   • GuaiFENesin ER  600 mg Oral BID   • Amiodarone HCl  400 mg Oral BID with meals   • famoTIDine  20 mg Oral 2 times per day    Or   • famoTIDine  20 mg Intravenous 2 times per day   • folic acid  1 mg Oral Da all sx agree   At this point Ms. Tye Peña is expected to be discharge to: acute rehab    Plan of care discussed with patient, , RN     DANIA Ott   1/10/17     Pt seen and examined, agree with above. No new complaints.     /82 mmHg  Puls

## 2017-01-10 NOTE — SLP NOTE
SPEECH DAILY NOTE - INPATIENT    Evaluation Date: 01/10/2017    ASSESSMENT & PLAN   ASSESSMENT  Pt seen for cognitive therapy session.   SLUMS administered yesterday 1/9/17 which revealed Patient achieved a raw score of 22/30 (Normal= 27-30; Mild= 21-26; De #1  The patient will tolerate regular consistency and thin liquids without overt signs or symptoms of aspiration with 95 % accuracy over 2-3 session(s).  (GOAL MET 1/10/17)    Goal #2  The patient/family/caregiver will demonstrate understanding and implemen

## 2017-01-10 NOTE — PROGRESS NOTES
[address] [facility] Moy Neurology Progress Note    Melissa Bustos Patient Status:  Inpatient    1935 MRN [id number]   [facility] 7NE-A Attending Lavonne Nassar MD   Hosp Day # 7 PCP Gabby Weber MD     Neurology Attending note     I h refusing AR but since yesterday evening decided to go to rehab. She denies HA, N/V, numbness/tingling, cp or palpitations. She denies SOB but continues to have a non-productive cough.   Continues to require assistance with ADLs and transfers, using walker 1/6/2017  TC                                                        134  Trg                                                       82  HDL                                                     43  LDL                                                      75 Transvalvular velocity was within the normal range. There was no evidence     for stenosis. There was mild, 1-2+ regurgitation. 4. Pulmonary arteries: Systolic pressure was mildly to moderately increased,     estimated to be 45mm Hg.   5. Pericardium, extr s/p hospitalization to improve conditioning.   Patient and  in agreement with plan    Dr. Prem Garcia to follow    DANIA Doyle  [facility]  [facility]  Pager [phone]  1/10/2017, 7:41 AM

## 2017-01-10 NOTE — PHYSICAL THERAPY NOTE
Attempted to see pt for PT services. Pt is currently occupied with breathing treatment, and would to finish her breakfast. Will re-attempt to see pt as schedule allows.

## 2017-01-10 NOTE — PROGRESS NOTES
[facility]  Progress Note    Chin Hung Patient Status:  Inpatient    1935 MRN [id number]   [facility] 7NE-A Attending Mariaa Carmichael MD   [facility] Day # 7 PCP Violetta Oppenheim, MD     Subjective:  Tara Garg Medications:  hydrALAzine HCl (APRESOLINE) injection 10 mg 10 mg Intravenous Q4H PRN   apixaban (ELIQUIS) tab 5 mg 5 mg Oral BID   Atorvastatin Calcium (LIPITOR) tab 40 mg 40 mg Oral Nightly   Losartan Potassium (COZAAR) tab 50 mg 50 mg Oral Daily   Labeta IV tPA on 1/5  · New Onset A-fib  · URTI w/  LLL PNA on CT Chest  · Small Left Pleural Effusion  · Hypoxia 2/2 above  · Cough Variant Asthma  · HTN  · MARLYS w/ No surgical intervention or stenting w/ plans to repeat CTA Brain in 1 month    Plan:    · Monito

## 2017-01-11 VITALS
HEIGHT: 64 IN | WEIGHT: 156 LBS | OXYGEN SATURATION: 97 % | SYSTOLIC BLOOD PRESSURE: 134 MMHG | RESPIRATION RATE: 18 BRPM | BODY MASS INDEX: 26.63 KG/M2 | DIASTOLIC BLOOD PRESSURE: 56 MMHG | HEART RATE: 65 BPM | TEMPERATURE: 98 F

## 2017-01-11 LAB
GLUCOSE BLD-MCNC: 101 MG/DL (ref 65–99)
GLUCOSE BLD-MCNC: 120 MG/DL (ref 65–99)

## 2017-01-11 PROCEDURE — 99232 SBSQ HOSP IP/OBS MODERATE 35: CPT | Performed by: INTERNAL MEDICINE

## 2017-01-11 PROCEDURE — 99239 HOSP IP/OBS DSCHRG MGMT >30: CPT | Performed by: HOSPITALIST

## 2017-01-11 RX ORDER — LOSARTAN POTASSIUM 100 MG/1
100 TABLET ORAL DAILY
Qty: 30 TABLET | Refills: 0 | Status: SHIPPED | OUTPATIENT
Start: 2017-01-11 | End: 2017-05-05

## 2017-01-11 RX ORDER — LABETALOL 200 MG/1
200 TABLET, FILM COATED ORAL EVERY 12 HOURS SCHEDULED
Qty: 60 TABLET | Refills: 11 | Status: SHIPPED | OUTPATIENT
Start: 2017-01-11 | End: 2017-01-25

## 2017-01-11 RX ORDER — LOSARTAN POTASSIUM 100 MG/1
100 TABLET ORAL DAILY
Status: DISCONTINUED | OUTPATIENT
Start: 2017-01-12 | End: 2017-01-11

## 2017-01-11 RX ORDER — AMIODARONE HYDROCHLORIDE 200 MG/1
200 TABLET ORAL DAILY
Status: DISCONTINUED | OUTPATIENT
Start: 2017-01-11 | End: 2017-01-11

## 2017-01-11 RX ORDER — FAMOTIDINE 20 MG/1
20 TABLET ORAL EVERY 12 HOURS SCHEDULED
Qty: 18 TABLET | Refills: 0 | Status: SHIPPED | OUTPATIENT
Start: 2017-01-11 | End: 2017-01-20

## 2017-01-11 NOTE — PROGRESS NOTES
[facility]  Cardiology Progress Note    Guerda Bravo Patient Status:  Inpatient    1935 MRN [id number]   [facility] 7NE-A Attending Bernardino Ramírez MD   [facility] Day # 8 PCP Samara Lentz MD     Subjective:  No complaints of chest Converted to sinus rhythm this morning as outlined.  Meds prior to admission from a blood pressure standpoint were labetalol 300 mg TID (a high dose) and low dose spironolactone 12.5 mg daily -- overall a bit of an unusual regimen -- I am fine with continui

## 2017-01-11 NOTE — PROGRESS NOTES
[facility]  Progress Note    Vianey Ger Patient Status:  Inpatient    1935 MRN [id number]   [facility] 7NE-A Attending Maddy Reed MD   [facility] Day # 8 PCP Grisel Nguyen MD     Subjective:  Ingrid Cutler Intravenous Q4H PRN   apixaban (ELIQUIS) tab 5 mg 5 mg Oral BID   Atorvastatin Calcium (LIPITOR) tab 40 mg 40 mg Oral Nightly   ipratropium-albuterol (DUONEB) nebulizer solution 3 mL 3 mL Nebulization QID   predniSONE (DELTASONE) tab 20 mg 20 mg Oral BID w BPT, nebs, IS  · Wean steroids  · AC  · HR control  · Stable to d/c on pulmonary on standpoint once all teams agree- needs O2 walk test prior to discharge. Will f/u with Sleep Medicine follow up and 10 day course ABX and home nebs.     [facility]

## 2017-01-11 NOTE — CM/SW NOTE
Gisella Cerda, 01/11/2017, 10:44 AM  Received call from Kaitlin Machado at [facility]. Insurance approved. Sent update. She will get back to me with bed assignment.

## 2017-01-11 NOTE — PLAN OF CARE
Assumed care at [address]. Pt A/O x4. RA. Controlled A Fib on tele. Neuros Q shift, Left sided weakness noted. SBP in the upper 160s. Hydralazine given, SBP rechecked and in the 150s. Pt resting comfortably. Will continue to monitor.     CARDIOVASCULAR - LORA

## 2017-01-11 NOTE — CM/SW NOTE
Ashia Anderson, 01/11/2017, 9:09 AM  Called [facility] and left a message for Georgia Bolton at [phone]. Informed her patient is ready to discharge today and need to know if insurance approval has cleared and if \"yes\" can they accept her today.   Left her

## 2017-01-11 NOTE — SLP NOTE
Attempted to see pt earlier this am for cog/comm therapy, however  pt ambulating with PT. SLP will re-attempt later today as able, however pt is likely discharging to [facility] acute rehab later today. Elaine Funes M.S.,CCC-SLP  Speech Language

## 2017-01-11 NOTE — PLAN OF CARE
O2 Walk  Resting before walk pt at 97%  Walking pt was at 94%, dropped down to 89%, but went back to 97% after rest.

## 2017-01-11 NOTE — PLAN OF CARE
NURSING DISCHARGE NOTE    Discharged Rehab facility via Wheelchair. Accompanied by Family member and Support staff  Belongings Taken by patient/family. Report given to [facility] RN, all questions and concerns answered.

## 2017-01-11 NOTE — CM/SW NOTE
Fern Tariq, 01/11/2017, 1:27 PM  Received call from Brissa Hardy at [facility]. They are ready for the patient. Pt. Will be going into room B202. Report to be called to [phone]. Informed Satnam Diaz RN. Patient's  will be driving her.

## 2017-01-11 NOTE — PROGRESS NOTES
[facility]  Progress Note     [facility] Patient Status:  Inpatient    1935 MRN [id number]   [facility] 3NE-A Attending Angelica House MD    Hosp Day # 8 PCP Dana Campos MD     Chief Complaint: weak     S: Patient    N 20 mg Intravenous 2 times per day   • folic acid  1 mg Oral Daily   • Vitamin B-1  100 mg Oral Daily   • multivitamin  1 tablet Oral Daily   • cefTRIAXone  1 g Intravenous Q24H       ASSESSMENT / PLAN:     1.  Acute RMCA infarction, s/p IV TPA (1/5)  arjun

## 2017-01-11 NOTE — PROGRESS NOTES
[facility]  Nephrology Progress Note    Chin Hung Patient Status:  Inpatient    1935 MRN [id number]   [facility] 7NE-A Attending Mariaa Carmichael MD   Hosp Day # 8 PCP Violetta Oppenheim, MD       SUBJECTIVE:  Was up walking in ha Current Facility-Administered Medications:  amiodarone HCl (PACERONE) tab 200 mg 200 mg Oral Daily   Labetalol HCl (NORMODYNE) tab 200 mg 200 mg Oral 2 times per day   spironolactone (ALDACTONE) tab 12.5 mg 12.5 mg Oral Daily   hydrALAzine HCl (APRESOL MD  1/11/2017  10:20 AM

## 2017-01-11 NOTE — CM/SW NOTE
Nina Peres, 01/11/2017, 2:22 PM  Per Edu Villasenor, patient's  no longer wants to drive her to [address] [place] [address].    Sent referral to [facility] for a Medicar to pick her up at 4:00 pm.

## 2017-01-11 NOTE — PHYSICAL THERAPY NOTE
PHYSICAL THERAPY TREATMENT NOTE - INPATIENT    Room Number: 4400/7200-E     Session: 3   Number of Visits to Meet Established Goals: 5    Presenting Problem: hyponatremia eith episodes of passing out, PNA 1/3/17; now s/p R MCA acute stroke 1/5/17    Probl Little   -   Sitting down on and standing up from a chair with arms (e.g., wheelchair, bedside commode, etc.): A Little   -   Moving from lying on back to sitting on the side of the bed?: A Little   How much help from another person does the patient sebastien their prior level of function. DISCHARGE RECOMMENDATIONS  PT Discharge Recommendations: Acute rehabilitation     PLAN  PT Treatment Plan: Bed mobility; Patient education; Family education;Gait training;Balance training;Transfer training;Strengthening

## 2017-01-11 NOTE — PLAN OF CARE
Pt A/Ox4, L eye blurred vision, slight L facial droop, LUE slightly weaker than the RUE, no other neuro deficits noted  On RA, AFib per tele, denies any pain  Pt up to chair and ambulating in room   Pt to be discharged to [facility]  Will cont to mo

## 2017-01-12 NOTE — CM/SW NOTE
01/12/17 0900   Discharge disposition   Discharged to: [address] [place] [address]   Name of Facillity/Home Care/Hospice [facility]   Patient assessed for rehabilitation services?  Yes   Patient is Discharged to a [address] [place] [address] Yes   Discharge transportatio

## 2017-01-12 NOTE — DISCHARGE SUMMARY
[facility]  DISCHARGE SUMMARY     Guerda Bravo Patient Status:  Inpatient    1935 MRN [id number]   [facility] 7NE-A Attending No att. providers found   [facility] Day # 8 PCP Samara Lentz MD     Date of Admission: 1/3/2017  Richard contacts. She has used the whirlpool at the [employer] in recent weeks. She feels very weak, fatigued.     Consults this admission include cardiology, neurosurgery, neurology, peripheral vascular disease, speech, PT OT social work, pulmonology, acute rehab, are likely thromboembolic in nature. Patient had CT scan 24 hours after TPA showed new low density right posterior cerebral artery territory infarct. Patient also actually had great improvement of left-sided weakness post TPA.   This has steadily improved seen by PT OT speech therapy is on a normal diet tolerating well. PT OT made the recommendation for acute rehab. Patient was evaluated and found to be appropriate per patient's request patient will be going to [facility]hab.    will be pr daily.    Quantity:  30 tablet   Refills:  5       Amoxicillin-Pot Clavulanate 875-125 MG Tabs   Commonly known as:  AUGMENTIN        Take 1 tablet by mouth 2 (two) times daily.     Stop taking on:  1/20/2017   Quantity:  2 tablet   Refills:  0       apixab food.    Quantity:  30 tablet   Refills:  0         CHANGE how you take these medications       Instructions Prescription details    Labetalol HCl 200 MG Tabs   Last time this was given:  200 mg on 1/11/2017  9:26 AM   Commonly known as:  Peter Postal   [address]  [address] [place] [address]  [phone]    Today      Sidra Paul MD  [address] [facility] [place] [address]  [phone]      follow up with Dr. Alessio Banuelos once discharged from

## 2017-01-16 NOTE — PROCEDURES
The technician reports that the patient was not to perform and  complete testing maneuvers as required.    ( due to  frequent cough and dyspnea     Uninterruptable testing

## 2017-01-25 ENCOUNTER — OFFICE VISIT (OUTPATIENT)
Dept: FAMILY MEDICINE CLINIC | Facility: CLINIC | Age: 82
End: 2017-01-25

## 2017-01-25 VITALS
WEIGHT: 156 LBS | SYSTOLIC BLOOD PRESSURE: 108 MMHG | BODY MASS INDEX: 27.64 KG/M2 | DIASTOLIC BLOOD PRESSURE: 62 MMHG | HEIGHT: 63 IN | RESPIRATION RATE: 20 BRPM | TEMPERATURE: 98 F | OXYGEN SATURATION: 99 % | HEART RATE: 67 BPM

## 2017-01-25 DIAGNOSIS — I10 ESSENTIAL HYPERTENSION: Primary | ICD-10-CM

## 2017-01-25 DIAGNOSIS — I48.20 CHRONIC ATRIAL FIBRILLATION (HCC): ICD-10-CM

## 2017-01-25 DIAGNOSIS — I63.511 ACUTE RIGHT MCA STROKE (HCC): ICD-10-CM

## 2017-01-25 DIAGNOSIS — E87.1 HYPONATREMIA: ICD-10-CM

## 2017-01-25 DIAGNOSIS — Z51.81 MEDICATION MONITORING ENCOUNTER: ICD-10-CM

## 2017-01-25 DIAGNOSIS — J18.9 COMMUNITY ACQUIRED PNEUMONIA: ICD-10-CM

## 2017-01-25 DIAGNOSIS — R55 SYNCOPE, UNSPECIFIED SYNCOPE TYPE: ICD-10-CM

## 2017-01-25 PROCEDURE — 99496 TRANSJ CARE MGMT HIGH F2F 7D: CPT | Performed by: FAMILY MEDICINE

## 2017-01-25 RX ORDER — MELATONIN
100 DAILY
COMMUNITY
End: 2017-06-26

## 2017-01-25 RX ORDER — FAMOTIDINE 20 MG/1
20 TABLET ORAL 2 TIMES DAILY
COMMUNITY
End: 2017-03-07

## 2017-01-25 RX ORDER — LABETALOL 300 MG/1
300 TABLET, FILM COATED ORAL 2 TIMES DAILY
COMMUNITY
Start: 2017-01-20 | End: 2017-02-28

## 2017-01-25 NOTE — PROGRESS NOTES
HPI:    Melissa Bustos is a 80year old female here today for hospital follow up.    She was discharged from SNF, after hospitalization to Home   Admit Date: 1/3/17 to hospital  Discharge Date: 1/20/17  Hospital Discharge Diagnosis:    Syncope, A fib, CVA mg total) by mouth daily. Atorvastatin Calcium 40 MG Oral Tab Take 1 tablet (40 mg total) by mouth nightly. amiodarone HCl 200 MG Oral Tab Take 1 tablet (200 mg total) by mouth daily.    apixaban 5 MG Oral Tab Take 1 tablet (5 mg total) by mouth 2 (two) denies headaches, denies dizziness, denies weakness.   PSYCHE: denies depression or anxiety  HEMATOLOGIC: denies hx of anemia, or bruising, denies bleeding  ENDOCRINE: denies thyroid history  ALL/ASTHMA: denies hx of allergy or asthma    PHYSICAL EXAM:   No defined types were placed in this encounter.        Meds & Refills for this Visit:  No prescriptions requested or ordered in this encounter       Imaging & Consults:  None        Transitional Care Management Certification:  I certify that the following are

## 2017-01-25 NOTE — PATIENT INSTRUCTIONS
Referrals to cardiology, neurology, pulmonary. Continue current medications. Comp metabolic, CBC & thyroid labs     Check with neurology & cardiology to get clearance and advice for going on the trip that you plan.

## 2017-01-26 ENCOUNTER — HOSPITAL ENCOUNTER (OUTPATIENT)
Dept: PHYSICAL THERAPY | Facility: HOSPITAL | Age: 82
Setting detail: THERAPIES SERIES
Discharge: HOME | End: 2017-01-26
Attending: PAIN MEDICINE
Payer: MEDICARE

## 2017-01-26 ENCOUNTER — MED REC SCAN ONLY (OUTPATIENT)
Dept: FAMILY MEDICINE CLINIC | Facility: CLINIC | Age: 82
End: 2017-01-26

## 2017-01-26 PROCEDURE — 97110 THERAPEUTIC EXERCISES: CPT

## 2017-01-26 PROCEDURE — 97162 PT EVAL MOD COMPLEX 30 MIN: CPT

## 2017-01-26 NOTE — PROGRESS NOTES
NEUROLOGICAL EVALUATION:   Referring Physician: Dr. Jessika Parker   Diagnosis: CVA, hemiparesis, hemiataxia   Date of Service: 1/26/2017     PATIENT SUMMARY   Ike Dee is a 80year old female who presents to therapy today following acute CVA and pneumonia.  She before hospitalization ( doing much of work), tolerates about 30 min standing and 30 min walking in community as with running errands.   Torres Tello describes prior level of function as independent in al household and community activity, active member of moderately nervous during evaluation often looking down at her hands and fidgeting her fingers. Sensation: Grossly intact to light touch HAVEN UE/LE.    Coordination: Finger to Nose, Alt Toe Tap Heel-to-Shin Slide assessed with dec speed TERESSA and DARVIN as comp appear motivated to improve/assist her to improve. Educated on clinical findings, POC, fall risks, and HEP as listed below.    HEP Issued and Handouts Given including standing marching, sit<>stand with hands as needed, side step at countertop, also instruct for your referral. Please co-sign or sign and return this letter via fax as soon as possible to [phone].  If you have any questions, please contact me at Dept: [phone]    Sincerely,  Electronically signed by therapist: Oniel Mcqueen PT    Physicia

## 2017-01-27 ENCOUNTER — PRIOR ORIGINAL RECORDS (OUTPATIENT)
Dept: OTHER | Age: 82
End: 2017-01-27

## 2017-01-27 ENCOUNTER — TELEPHONE (OUTPATIENT)
Dept: FAMILY MEDICINE CLINIC | Facility: CLINIC | Age: 82
End: 2017-01-27

## 2017-01-27 ENCOUNTER — LAB ENCOUNTER (OUTPATIENT)
Dept: LAB | Age: 82
End: 2017-01-27
Attending: FAMILY MEDICINE
Payer: MEDICARE

## 2017-01-27 DIAGNOSIS — I10 ESSENTIAL HYPERTENSION: ICD-10-CM

## 2017-01-27 DIAGNOSIS — Z51.81 MEDICATION MONITORING ENCOUNTER: ICD-10-CM

## 2017-01-27 LAB
ALBUMIN SERPL-MCNC: 3 G/DL (ref 3.5–4.8)
ALP LIVER SERPL-CCNC: 86 U/L (ref 55–142)
ALT SERPL-CCNC: 36 U/L (ref 14–54)
AST SERPL-CCNC: 27 U/L (ref 15–41)
BASOPHILS # BLD AUTO: 0.03 X10(3) UL (ref 0–0.1)
BASOPHILS NFR BLD AUTO: 0.5 %
BILIRUB SERPL-MCNC: 1.1 MG/DL (ref 0.1–2)
BUN BLD-MCNC: 6 MG/DL (ref 8–20)
CALCIUM BLD-MCNC: 8.7 MG/DL (ref 8.3–10.3)
CHLORIDE: 105 MMOL/L (ref 101–111)
CO2: 24 MMOL/L (ref 22–32)
CREAT BLD-MCNC: 0.94 MG/DL (ref 0.55–1.02)
EOSINOPHIL # BLD AUTO: 0.12 X10(3) UL (ref 0–0.3)
EOSINOPHIL NFR BLD AUTO: 2.2 %
ERYTHROCYTE [DISTWIDTH] IN BLOOD BY AUTOMATED COUNT: 13.3 % (ref 11.5–16)
GLUCOSE BLD-MCNC: 78 MG/DL (ref 70–99)
HCT VFR BLD AUTO: 35.4 % (ref 34–50)
HGB BLD-MCNC: 11.6 G/DL (ref 12–16)
IMMATURE GRANULOCYTE COUNT: 0.03 X10(3) UL (ref 0–1)
IMMATURE GRANULOCYTE RATIO %: 0.5 %
LYMPHOCYTES # BLD AUTO: 0.91 X10(3) UL (ref 0.9–4)
LYMPHOCYTES NFR BLD AUTO: 16.3 %
M PROTEIN MFR SERPL ELPH: 6.7 G/DL (ref 6.1–8.3)
MCH RBC QN AUTO: 34 PG (ref 27–33.2)
MCHC RBC AUTO-ENTMCNC: 32.8 G/DL (ref 31–37)
MCV RBC AUTO: 103.8 FL (ref 81–100)
MONOCYTES # BLD AUTO: 0.42 X10(3) UL (ref 0.1–0.6)
MONOCYTES NFR BLD AUTO: 7.5 %
NEUTROPHIL ABS PRELIM: 4.07 X10 (3) UL (ref 1.3–6.7)
NEUTROPHILS # BLD AUTO: 4.07 X10(3) UL (ref 1.3–6.7)
NEUTROPHILS NFR BLD AUTO: 73 %
PLATELET # BLD AUTO: 264 10(3)UL (ref 150–450)
POTASSIUM SERPL-SCNC: 4 MMOL/L (ref 3.6–5.1)
RBC # BLD AUTO: 3.41 X10(6)UL (ref 3.8–5.1)
RED CELL DISTRIBUTION WIDTH-SD: 51.4 FL (ref 35.1–46.3)
SODIUM SERPL-SCNC: 137 MMOL/L (ref 136–144)
TSI SER-ACNC: 1.7 MIU/ML (ref 0.35–5.5)
WBC # BLD AUTO: 5.6 X10(3) UL (ref 4–13)

## 2017-01-27 PROCEDURE — 84443 ASSAY THYROID STIM HORMONE: CPT

## 2017-01-27 PROCEDURE — 85025 COMPLETE CBC W/AUTO DIFF WBC: CPT

## 2017-01-27 PROCEDURE — 80053 COMPREHEN METABOLIC PANEL: CPT

## 2017-01-27 PROCEDURE — 36415 COLL VENOUS BLD VENIPUNCTURE: CPT

## 2017-01-27 NOTE — TELEPHONE ENCOUNTER
Called the Pulmonologist and  does not see patients in the office, only at the hospital.  Pt would like to hold off on having the sleep study appt until at least after the other specialists appts. Not really sure if she needs a sleep study.   Also, they

## 2017-01-27 NOTE — TELEPHONE ENCOUNTER
Notify I'll request a hold on her membership during February. I can't really request a hold for other family members.

## 2017-01-28 NOTE — TELEPHONE ENCOUNTER
Spoke to pt's . Told him Dr Eun Hua has written and signed the note and I faxed to [place] 1-28, Saturday. Also explained that we cannot write another note for him as he is not a pt with Dr Katy Lackey.     Advised pt to call his own dr if he needs a M

## 2017-01-31 ENCOUNTER — OFFICE VISIT (OUTPATIENT)
Dept: SURGERY | Facility: CLINIC | Age: 82
End: 2017-01-31

## 2017-01-31 ENCOUNTER — OFFICE VISIT (OUTPATIENT)
Dept: FAMILY MEDICINE CLINIC | Facility: CLINIC | Age: 82
End: 2017-01-31

## 2017-01-31 VITALS
DIASTOLIC BLOOD PRESSURE: 78 MMHG | TEMPERATURE: 97 F | SYSTOLIC BLOOD PRESSURE: 128 MMHG | OXYGEN SATURATION: 98 % | HEIGHT: 63 IN | BODY MASS INDEX: 26.22 KG/M2 | WEIGHT: 148 LBS | HEART RATE: 72 BPM | RESPIRATION RATE: 18 BRPM

## 2017-01-31 VITALS
BODY MASS INDEX: 26.22 KG/M2 | SYSTOLIC BLOOD PRESSURE: 176 MMHG | WEIGHT: 148 LBS | DIASTOLIC BLOOD PRESSURE: 89 MMHG | TEMPERATURE: 98 F | HEART RATE: 64 BPM | RESPIRATION RATE: 16 BRPM | HEIGHT: 63 IN

## 2017-01-31 DIAGNOSIS — I10 ESSENTIAL HYPERTENSION: ICD-10-CM

## 2017-01-31 DIAGNOSIS — Z96.641 STATUS POST TOTAL REPLACEMENT OF RIGHT HIP: ICD-10-CM

## 2017-01-31 DIAGNOSIS — Z79.01 CHRONIC ANTICOAGULATION: ICD-10-CM

## 2017-01-31 DIAGNOSIS — I65.21 STENOSIS OF RIGHT CAROTID ARTERY: ICD-10-CM

## 2017-01-31 DIAGNOSIS — K42.0 INCARCERATED UMBILICAL HERNIA: Primary | ICD-10-CM

## 2017-01-31 DIAGNOSIS — I48.20 CHRONIC ATRIAL FIBRILLATION (HCC): ICD-10-CM

## 2017-01-31 DIAGNOSIS — E87.1 HYPONATREMIA: ICD-10-CM

## 2017-01-31 DIAGNOSIS — I69.359 HEMIPARESIS DUE TO RECENT CEREBROVASCULAR ACCIDENT (CVA) (HCC): ICD-10-CM

## 2017-01-31 PROCEDURE — 99204 OFFICE O/P NEW MOD 45 MIN: CPT | Performed by: SURGERY

## 2017-01-31 PROCEDURE — 99214 OFFICE O/P EST MOD 30 MIN: CPT | Performed by: FAMILY MEDICINE

## 2017-01-31 NOTE — PATIENT INSTRUCTIONS
Go directly to the office of Dr. Xin Horta, [address] [place] [address] (a block Ruby of [facility], on [facility]) [phone]. She will help decide whether and when you need surgery. Consult to cardiology.

## 2017-01-31 NOTE — PROGRESS NOTES
Paula Hairston IS A 80year old female [address] Patient presents with:  Hernia: Room 5-- Pt is having pain near the naval area. There is tenderness and swelling. Pain is constant, aggravated by bending over. Started Sunday evening.        History of present Take 100 mg by mouth daily. Disp:  Rfl:    Losartan Potassium 100 MG Oral Tab Take 1 tablet (100 mg total) by mouth daily. Disp: 30 tablet Rfl: 0   Atorvastatin Calcium 40 MG Oral Tab Take 1 tablet (40 mg total) by mouth nightly.  Disp: 30 tablet Rfl: 1   i Comment: little meat, a lot of veggies, some chicken    Exercise Yes    Comment: 2-3 days per week, water aerobics     Social History Narrative    Lives with , recent move to [facility]. No children. No pets.  No Sikh affiliation       Review of [facility]) [phone]. She will help decide whether and when you need surgery. Consult to hematology/oncology & cardiology.          Note: I had originally thought hematology might have been involved with the Eliquis but cardiology prescribed &

## 2017-02-01 ENCOUNTER — TELEPHONE (OUTPATIENT)
Dept: FAMILY MEDICINE CLINIC | Facility: CLINIC | Age: 82
End: 2017-02-01

## 2017-02-01 ENCOUNTER — PRIOR ORIGINAL RECORDS (OUTPATIENT)
Dept: OTHER | Age: 82
End: 2017-02-01

## 2017-02-01 DIAGNOSIS — Z79.01 CHRONIC ANTICOAGULATION: Primary | ICD-10-CM

## 2017-02-01 NOTE — TELEPHONE ENCOUNTER
Spoke with pt, she received call from Dr Oliver Thomas office saying he would given her Cardiac clearance  Pt was not seen    Called cardiology and they stated they faxed the clearance letter to Dr Van Castanon and Dr Siri Duron about 3:30pm    Will await letter

## 2017-02-01 NOTE — TELEPHONE ENCOUNTER
----- Message from Gladis Oropeza sent at 2/1/2017  1:21 PM CST -----  Contact: Patient  Pt called stating that she has received clearance from her Cardiologist for surgery, but is unable to get in to see the Hematologist until Friday.  Pt wasn't sure if she

## 2017-02-01 NOTE — H&P
New Patient Visit Note       Active Problems      No diagnosis found. Chief Complaint   No chief complaint on file.     History of Present Illness         Allergies  Mary Cordero is allergic to ace inhibitors; amlodipine; furosemide; and thiazide-type diureti Sexual Activity: Yes               Partners with: Male    Other Topics            Concern  Blood Transfusions      Yes    Comment:14 yr ago with hip replacement (own blood)  Caffeine Concern        Yes    Comment:coffee and tea  Occupational Exp hearing loss, nosebleeds, sore throat and trouble swallowing. Respiratory: Negative for apnea, cough, shortness of breath and wheezing. Cardiovascular: Negative for chest pain, palpitations and leg swelling.    Gastrointestinal: Negative for nausea, v

## 2017-02-01 NOTE — H&P
New Patient  Franky De Leon  2/23/1935 1/31/2017    This patient was referred by Samia Ramirez MD for evaluation and consultation for incarcerated umbilical hernia. Chief Complaint: Periumbilical pain    History of Present Illness:  The patient is Taz Friday, right    [address], [place]         Social History   Marital Status:   Spouse Name: N/A    Years of Education: N/A  Number of Children: 0     Occupational History  Retired   r MG Oral Tab, Take 1 tablet (200 mg total) by mouth daily. , Disp: 30 tablet, Rfl: 5  •  spironolactone 25 MG Oral Tab, Take 0.5 tablets (12.5 mg total) by mouth daily. , Disp: 30 tablet, Rfl: 11  •  apixaban 5 MG Oral Tab, Take 1 tablet (5 mg total) by mouth alternatives to any proposed surgery were also fully reviewed with the patient. Any proposed surgical procedure was described in detail. The patient verbalizes understanding.   All questions from the patient were discussed in detail to the patient's satisf and her  did not wish to proceed with surgery tomorrow. Dr Katie Kay will arrange for [facility] to see heme onc and cardiology tomorrow for evaluation prior to surgery and recommendations regarding Eunetta's anti-coagulation.   [facility] and her  wi

## 2017-02-01 NOTE — TELEPHONE ENCOUNTER
Spoke to Maria uLisa Thakur in Dr Autumn Ly office they will call the patient with an appointment. Advised needs today for Cardiac clearance. Devante Monroe M.D.   Cardiovascular Disease, Interventional Cardiology  [address] [facility]  [address]

## 2017-02-02 ENCOUNTER — APPOINTMENT (OUTPATIENT)
Dept: PHYSICAL THERAPY | Facility: HOSPITAL | Age: 82
End: 2017-02-02
Attending: PAIN MEDICINE
Payer: MEDICARE

## 2017-02-02 ENCOUNTER — TELEPHONE (OUTPATIENT)
Dept: SURGERY | Facility: CLINIC | Age: 82
End: 2017-02-02

## 2017-02-02 ENCOUNTER — PATIENT MESSAGE (OUTPATIENT)
Dept: FAMILY MEDICINE CLINIC | Facility: CLINIC | Age: 82
End: 2017-02-02

## 2017-02-02 DIAGNOSIS — K42.9 UMBILICAL HERNIA WITHOUT OBSTRUCTION OR GANGRENE: Primary | ICD-10-CM

## 2017-02-02 NOTE — TELEPHONE ENCOUNTER
Dr Chavez Groves, ok for pt to delay surgery this long?   Future Appointments  Date Time Provider Nu Lopez   2/3/2017 11:00 AM Sandro Menezes MD [facility] HEM [address]   2/7/2017 11:00 AM DO MILTON Valdes [facility]   2/13/2017 11:1

## 2017-02-02 NOTE — TELEPHONE ENCOUNTER
Dr. Treasure Momin cannot get her in for surgery until 2-14. She has already stopped her Eliquis. Does she still need clearance from Dr Adela Saul? She doesn't know what to do at this point. Pls call.

## 2017-02-02 NOTE — TELEPHONE ENCOUNTER
----- Message from Mckay Carrasco LPN sent at 5/8/8707  2:04 PM CST -----  Contact: Patient      ----- Message -----     From: Wayne Crawley     Sent: 2/1/2017   1:21 PM       To: [facility] Clinical Staff    Pt called stating that she has received clearanc

## 2017-02-02 NOTE — TELEPHONE ENCOUNTER
Cardiology cleared pt & addressed Nelaruth, she needs surgery probably Friday, she should cancel with Clif, can keep appt with Methenia post-op.

## 2017-02-03 ENCOUNTER — TELEPHONE (OUTPATIENT)
Dept: SURGERY | Facility: CLINIC | Age: 82
End: 2017-02-03

## 2017-02-03 ENCOUNTER — APPOINTMENT (OUTPATIENT)
Dept: HEMATOLOGY/ONCOLOGY | Facility: HOSPITAL | Age: 82
End: 2017-02-03
Attending: INTERNAL MEDICINE
Payer: MEDICARE

## 2017-02-03 RX ORDER — MELATONIN
1000 DAILY
COMMUNITY
End: 2018-06-26

## 2017-02-03 NOTE — TELEPHONE ENCOUNTER
From: Aisha White  To: Reyna Rapp MD  Sent: 2/2/2017 3:51 PM CST  Subject: Prescription Question    Dr. Gunner Govea has scheduled ny surgery for 02/13/2017. Should I continue to not take Eliquis?  Is there something to support the hernia to relieve disc

## 2017-02-03 NOTE — TELEPHONE ENCOUNTER
Discussed with Dr. Manjinder Agrawal, her  didn't realize the patient needed to be seen sooner, I informed Dr. Manjinder Agrawal pt is off Eliquis and was cleared by cardiology, doesn't need heme clearance, patient informed to cancel the hematology appt for today (inform

## 2017-02-03 NOTE — TELEPHONE ENCOUNTER
Future Appointments  Date Time Provider Nu Lopez   2/7/2017 11:00 AM Bucky Causey DO [facility] [place]   2/13/2017 11:15 AM Nino Flores MD [facility] [address]     Spoke with pt advised to await call from dr Albert Rubin office regarding mo

## 2017-02-03 NOTE — TELEPHONE ENCOUNTER
Future Appointments  Date Time Provider Nu Lopez   2/7/2017 11:00 AM Stephie Shaver DO [facility] [place]   2/13/2017 11:15 AM Andrews Mo MD [facility] [address]

## 2017-02-04 NOTE — PROGRESS NOTES
Patient was cleared on Wednesday 2/1/17 in signed letter from DR. Tamy Gatica, indicating that she had cardiac clearance and was told to stop the Eliquis that day.  Based on my evaluation 1/31/17, I consider Bernadette Sainz to be at acceptable but akin

## 2017-02-06 ENCOUNTER — HOSPITAL ENCOUNTER (OUTPATIENT)
Facility: HOSPITAL | Age: 82
Setting detail: HOSPITAL OUTPATIENT SURGERY
Discharge: HOME | End: 2017-02-06
Attending: SURGERY | Admitting: SURGERY
Payer: MEDICARE

## 2017-02-06 ENCOUNTER — ANESTHESIA (OUTPATIENT)
Dept: SURGERY | Facility: HOSPITAL | Age: 82
End: 2017-02-06

## 2017-02-06 ENCOUNTER — ANESTHESIA EVENT (OUTPATIENT)
Dept: SURGERY | Facility: HOSPITAL | Age: 82
End: 2017-02-06

## 2017-02-06 ENCOUNTER — SURGERY (OUTPATIENT)
Age: 82
End: 2017-02-06

## 2017-02-06 VITALS
SYSTOLIC BLOOD PRESSURE: 153 MMHG | HEIGHT: 63 IN | TEMPERATURE: 99 F | HEART RATE: 76 BPM | WEIGHT: 137 LBS | OXYGEN SATURATION: 97 % | DIASTOLIC BLOOD PRESSURE: 86 MMHG | RESPIRATION RATE: 16 BRPM | BODY MASS INDEX: 24.27 KG/M2

## 2017-02-06 DIAGNOSIS — K42.9 UMBILICAL HERNIA WITHOUT OBSTRUCTION OR GANGRENE: ICD-10-CM

## 2017-02-06 PROCEDURE — 0WQF0ZZ REPAIR ABDOMINAL WALL, OPEN APPROACH: ICD-10-PCS | Performed by: SURGERY

## 2017-02-06 PROCEDURE — 88302 TISSUE EXAM BY PATHOLOGIST: CPT | Performed by: SURGERY

## 2017-02-06 RX ORDER — BUPIVACAINE HYDROCHLORIDE AND EPINEPHRINE 5; 5 MG/ML; UG/ML
INJECTION, SOLUTION EPIDURAL; INTRACAUDAL; PERINEURAL AS NEEDED
Status: DISCONTINUED | OUTPATIENT
Start: 2017-02-06 | End: 2017-02-06

## 2017-02-06 RX ORDER — SODIUM CHLORIDE, SODIUM LACTATE, POTASSIUM CHLORIDE, CALCIUM CHLORIDE 600; 310; 30; 20 MG/100ML; MG/100ML; MG/100ML; MG/100ML
INJECTION, SOLUTION INTRAVENOUS CONTINUOUS
Status: DISCONTINUED | OUTPATIENT
Start: 2017-02-06 | End: 2017-02-06

## 2017-02-06 RX ORDER — HYDROCODONE BITARTRATE AND ACETAMINOPHEN 5; 325 MG/1; MG/1
1 TABLET ORAL AS NEEDED
Status: DISCONTINUED | OUTPATIENT
Start: 2017-02-06 | End: 2017-02-06

## 2017-02-06 RX ORDER — ONDANSETRON 2 MG/ML
INJECTION INTRAMUSCULAR; INTRAVENOUS
Status: COMPLETED
Start: 2017-02-06 | End: 2017-02-06

## 2017-02-06 RX ORDER — NALOXONE HYDROCHLORIDE 0.4 MG/ML
80 INJECTION, SOLUTION INTRAMUSCULAR; INTRAVENOUS; SUBCUTANEOUS AS NEEDED
Status: DISCONTINUED | OUTPATIENT
Start: 2017-02-06 | End: 2017-02-06

## 2017-02-06 RX ORDER — HYDROMORPHONE HYDROCHLORIDE 1 MG/ML
INJECTION, SOLUTION INTRAMUSCULAR; INTRAVENOUS; SUBCUTANEOUS
Status: COMPLETED
Start: 2017-02-06 | End: 2017-02-06

## 2017-02-06 RX ORDER — HYDROCODONE BITARTRATE AND ACETAMINOPHEN 5; 325 MG/1; MG/1
2 TABLET ORAL AS NEEDED
Status: DISCONTINUED | OUTPATIENT
Start: 2017-02-06 | End: 2017-02-06

## 2017-02-06 RX ORDER — DEXAMETHASONE SODIUM PHOSPHATE 4 MG/ML
4 VIAL (ML) INJECTION AS NEEDED
Status: DISCONTINUED | OUTPATIENT
Start: 2017-02-06 | End: 2017-02-06

## 2017-02-06 RX ORDER — LIDOCAINE HYDROCHLORIDE 10 MG/ML
INJECTION, SOLUTION INFILTRATION; PERINEURAL AS NEEDED
Status: DISCONTINUED | OUTPATIENT
Start: 2017-02-06 | End: 2017-02-06

## 2017-02-06 RX ORDER — ONDANSETRON 2 MG/ML
4 INJECTION INTRAMUSCULAR; INTRAVENOUS AS NEEDED
Status: DISCONTINUED | OUTPATIENT
Start: 2017-02-06 | End: 2017-02-06

## 2017-02-06 RX ORDER — HYDROMORPHONE HYDROCHLORIDE 1 MG/ML
0.4 INJECTION, SOLUTION INTRAMUSCULAR; INTRAVENOUS; SUBCUTANEOUS EVERY 5 MIN PRN
Status: DISCONTINUED | OUTPATIENT
Start: 2017-02-06 | End: 2017-02-06

## 2017-02-06 NOTE — H&P (VIEW-ONLY)
New Patient  Pravin Verdugo  2/23/1935 1/31/2017    This patient was referred by Yanique Lopez MD for evaluation and consultation for incarcerated umbilical hernia. Chief Complaint: Periumbilical pain    History of Present Illness:  The patient is Jen Panchal, right    [address], [place]         Social History   Marital Status:   Spouse Name: N/A    Years of Education: N/A  Number of Children: 0     Occupational History  Retired   r MG Oral Tab, Take 1 tablet (200 mg total) by mouth daily. , Disp: 30 tablet, Rfl: 5  •  spironolactone 25 MG Oral Tab, Take 0.5 tablets (12.5 mg total) by mouth daily. , Disp: 30 tablet, Rfl: 11  •  apixaban 5 MG Oral Tab, Take 1 tablet (5 mg total) by mouth alternatives to any proposed surgery were also fully reviewed with the patient. Any proposed surgical procedure was described in detail. The patient verbalizes understanding.   All questions from the patient were discussed in detail to the patient's satisf and her  did not wish to proceed with surgery tomorrow. Dr Eun Hua will arrange for [facility] to see heme onc and cardiology tomorrow for evaluation prior to surgery and recommendations regarding Eunetta's anti-coagulation.   [facility] and her  wi

## 2017-02-06 NOTE — ANESTHESIA PREPROCEDURE EVALUATION
PRE-OP EVALUATION    Patient Name: Aisha White    Pre-op Diagnosis: Umbilical hernia without obstruction or gangrene [K42.9]    Procedure(s): UMBILICAL HERNIORRHAPHY    Surgeon(s) and Role:     Nafisa Catalan MD - Primary    Pre-op vitals reviewed. Regional wall motion abnormality: Possible moderate hypokinesis of the     basal inferior myocardium. 3. Tricuspid valve: Structurally normal valve. Normal thickness leaflets.     Transvalvular velocity was within the normal range.  There was no evidence Dental             Pulmonary      Breath sounds clear to auscultation bilaterally. Other findings            ASA: 3   Plan: general  NPO status verified and patient meets guidelines. Patient has taken beta blockers in last 24 hours.   Post-

## 2017-02-06 NOTE — ANESTHESIA POSTPROCEDURE EVALUATION
[facility] Patient Status:  Hospital Outpatient Surgery   Age/Gender 80year old female MRN [id number]   [facility] SURGERY Attending Jorge Evans MD   Hosp Day # 0 PCP July Leon MD       Anesthesia Post-op No

## 2017-02-06 NOTE — BRIEF OP NOTE
[facility] SURGERY  Brief Op Note     Justine Carreon Location: OR   [facility] [id number] MRN [id number]   Admission Date 2/6/2017 Operation Date 2/6/2017   Attending Physician Enrrique Turcios MD Operating Physician Josué Arrington MD       Pre-Operative Baltazar Salcido

## 2017-02-06 NOTE — OPERATIVE REPORT
Fransisco Barrera  Operative Note    Liz Floyd Location: OR   [facility] [id number] N [id number]   Admission Date 2/6/2017 Operation Date 2/6/2017   Attending Physician Artur Oliver MD Operating Physician Bren Glover MD     Date of procedure: acutely incarcerated hernia. The patient and her  understandably have concerns regarding surgery given her recent admission and anticoagulation status. The risks and benefits of surgery were reviewed.   Norah Mccormick was offered a surgical time tomorrow there are no further questions at this time and they do wish to proceed with surgery today. The patient was marked in the preoperative holding area. Summary of case:    The patient was taken to the operating room and was placed on the OR table in the s

## 2017-02-06 NOTE — INTERVAL H&P NOTE
Pre-op Diagnosis: Umbilical hernia without obstruction or gangrene [K42.9]    The above referenced H&P was reviewed by Jay Rosales MD on 2/6/2017, the patient was examined and no significant changes have occurred in the patient's condition since the H&

## 2017-02-07 ENCOUNTER — APPOINTMENT (OUTPATIENT)
Dept: PHYSICAL THERAPY | Facility: HOSPITAL | Age: 82
End: 2017-02-07
Attending: PAIN MEDICINE
Payer: MEDICARE

## 2017-02-09 ENCOUNTER — OFFICE VISIT (OUTPATIENT)
Dept: NEUROLOGY | Facility: CLINIC | Age: 82
End: 2017-02-09

## 2017-02-09 ENCOUNTER — APPOINTMENT (OUTPATIENT)
Dept: PHYSICAL THERAPY | Facility: HOSPITAL | Age: 82
End: 2017-02-09
Attending: PAIN MEDICINE
Payer: MEDICARE

## 2017-02-09 ENCOUNTER — TELEPHONE (OUTPATIENT)
Dept: NEUROLOGY | Facility: CLINIC | Age: 82
End: 2017-02-09

## 2017-02-09 VITALS
RESPIRATION RATE: 18 BRPM | BODY MASS INDEX: 25 KG/M2 | SYSTOLIC BLOOD PRESSURE: 126 MMHG | DIASTOLIC BLOOD PRESSURE: 72 MMHG | WEIGHT: 143 LBS | HEART RATE: 78 BPM

## 2017-02-09 DIAGNOSIS — Z86.73 H/O ISCHEMIC RIGHT PCA STROKE: ICD-10-CM

## 2017-02-09 DIAGNOSIS — I65.21 STENOSIS OF RIGHT CAROTID ARTERY: ICD-10-CM

## 2017-02-09 DIAGNOSIS — I63.511 ACUTE RIGHT MCA STROKE (HCC): Primary | ICD-10-CM

## 2017-02-09 PROCEDURE — 99214 OFFICE O/P EST MOD 30 MIN: CPT | Performed by: OTHER

## 2017-02-09 NOTE — PATIENT INSTRUCTIONS
Refill policies:    • Allow 2 business days for refills; controlled substances may take longer.   • Contact your pharmacy at least 5 days prior to running out of medication and have them send an electronic request or submit request through the “request re your physician has recommended that you have a procedure or additional testing performed. [facility]) will contact your insurance carrier to obtain pre-certification or prior authorization.     Unfortunately, [employer] has seen an increas

## 2017-02-09 NOTE — PROGRESS NOTES
Neurology H&P    Nolene Goods Patient Status:  No patient class for patient encounter    1935 MRN [id number]   Location [address] [place] [address], [address] [place] [address] Drive, [address] [place] [address] Attending No att. providers found   [facility] Day #  PCP MD Jarrell Perez tablets (12.5 mg total) by mouth daily.  Disp: 30 tablet Rfl: 11       Problem List:  Patient Active Problem List:     Essential hypertension, benign     Osteoarthritis of hip     Cataract     Status post total replacement of right hip     Hyponatremia lower extremity swelling  GI: no constipation or abdominal pain  : denies frequent urination or difficulty urinating   Heme: no new bruising, no unexplained fevers or chills  Endocrine: no unexplained weight loss or gain, no new fatigue  Musculoskeletal: Imaging:  MRI Brain 1/6/17  =====  CONCLUSION:  Scattered infarcts. the right cerebral hemisphere along with a more localized region the infarction in the right PCA territory is noted.  Multiple infarcts in the left cerebellar hemisphere are also no - LDL 75 as inpatient  - Continue to follow up with cardiology  - Pt needs to schedule CTA head and neck. Orders already placed  - MRI and CTH imaging wwas reviewed in detail with pt.  I showed her the multiple infarcts and discussed with her the likely katherine

## 2017-02-10 PROBLEM — Z86.73: Status: ACTIVE | Noted: 2017-02-10

## 2017-02-10 NOTE — TELEPHONE ENCOUNTER
Called [facility] per Davy Vera no authorization is needed for CTA Brain [id number] + CTA neck [id number]. Call reference #[id number].  Time on call 4:08    Called patient and left detailed message including centralized scheduling phone #

## 2017-02-14 ENCOUNTER — APPOINTMENT (OUTPATIENT)
Dept: PHYSICAL THERAPY | Facility: HOSPITAL | Age: 82
End: 2017-02-14
Attending: PAIN MEDICINE
Payer: MEDICARE

## 2017-02-14 ENCOUNTER — MED REC SCAN ONLY (OUTPATIENT)
Dept: FAMILY MEDICINE CLINIC | Facility: CLINIC | Age: 82
End: 2017-02-14

## 2017-02-15 ENCOUNTER — PRIOR ORIGINAL RECORDS (OUTPATIENT)
Dept: OTHER | Age: 82
End: 2017-02-15

## 2017-02-15 ENCOUNTER — MYAURORA ACCOUNT LINK (OUTPATIENT)
Dept: OTHER | Age: 82
End: 2017-02-15

## 2017-02-16 ENCOUNTER — TELEPHONE (OUTPATIENT)
Dept: FAMILY MEDICINE CLINIC | Facility: CLINIC | Age: 82
End: 2017-02-16

## 2017-02-16 ENCOUNTER — APPOINTMENT (OUTPATIENT)
Dept: PHYSICAL THERAPY | Facility: HOSPITAL | Age: 82
End: 2017-02-16
Attending: PAIN MEDICINE
Payer: MEDICARE

## 2017-02-16 ENCOUNTER — OFFICE VISIT (OUTPATIENT)
Dept: SURGERY | Facility: CLINIC | Age: 82
End: 2017-02-16

## 2017-02-16 VITALS — TEMPERATURE: 98 F | WEIGHT: 140 LBS | BODY MASS INDEX: 24.8 KG/M2 | HEIGHT: 63 IN

## 2017-02-16 DIAGNOSIS — Z09 S/P UMBILICAL HERNIA REPAIR, FOLLOW-UP EXAM: Primary | ICD-10-CM

## 2017-02-16 DIAGNOSIS — K42.0 INCARCERATED UMBILICAL HERNIA: ICD-10-CM

## 2017-02-16 PROCEDURE — 99024 POSTOP FOLLOW-UP VISIT: CPT | Performed by: SURGERY

## 2017-02-16 NOTE — TELEPHONE ENCOUNTER
Pt was in to see Dr Jhoana Mark today ofr Post op F/U   She has had a cough now for about 1.5 days and Dr Jhoana Mark recommended she reach out to her PCP to see what OTC cough suppressant it was ok for her to take

## 2017-02-17 LAB
ALBUMIN: 3 G/DL
ALKALINE PHOSPHATATE(ALK PHOS): 86 IU/L
BILIRUBIN TOTAL: 1.1 MG/DL
BUN: 6 MG/DL
CALCIUM: 8.7 MG/DL
CHLORIDE: 105 MEQ/L
CREATININE, SERUM: 0.94 MG/DL
GLUCOSE: 78 MG/DL
HEMATOCRIT: 35.4 %
HEMOGLOBIN: 11.6 G/DL
PLATELETS: 264 K/UL
POTASSIUM, SERUM: 4 MEQ/L
PROTEIN, TOTAL: 6.7 G/DL
RED BLOOD COUNT: 3.41 X 10-6/U
SGOT (AST): 27 IU/L
SGPT (ALT): 36 IU/L
SODIUM: 137 MEQ/L
THYROID STIMULATING HORMONE: 1.7 MLU/L
WHITE BLOOD COUNT: 5.6 X 10-3/U

## 2017-02-17 NOTE — TELEPHONE ENCOUNTER
Patient could try Delsym over the counter, 2 teaspoons. Or we can call rx for benzonatate 200 mg, swallowed every 8 hours (not dissolved in mouth, that would cause numb tongue).  If she is short of breath, wheezing, feeling chest pain or has a fever, we lolis

## 2017-02-17 NOTE — TELEPHONE ENCOUNTER
Future Appointments  Spoke to patient and gave information. She only has cough. Will do OTC Delsym and see PCP prn.

## 2017-02-17 NOTE — PROGRESS NOTES
GENERAL SURGERY    Keith Rosado MD, FACS, Marlo Gray. Rhonda Valadez MD, Crystal Craig MD, FACS  Emma Salas.  Rhonda Barksdale MD, Waynetta Brunner, MD, FACS  Santy Ross, PA-C  Janee Kidney PA-C         Ken Bustos

## 2017-02-21 ENCOUNTER — APPOINTMENT (OUTPATIENT)
Dept: PHYSICAL THERAPY | Facility: HOSPITAL | Age: 82
End: 2017-02-21
Attending: PAIN MEDICINE
Payer: MEDICARE

## 2017-02-23 ENCOUNTER — APPOINTMENT (OUTPATIENT)
Dept: PHYSICAL THERAPY | Facility: HOSPITAL | Age: 82
End: 2017-02-23
Attending: PAIN MEDICINE
Payer: MEDICARE

## 2017-02-28 ENCOUNTER — TELEPHONE (OUTPATIENT)
Dept: FAMILY MEDICINE CLINIC | Facility: CLINIC | Age: 82
End: 2017-02-28

## 2017-02-28 RX ORDER — LABETALOL 300 MG/1
TABLET, FILM COATED ORAL
Qty: 180 TABLET | Refills: 0 | Status: SHIPPED | OUTPATIENT
Start: 2017-02-28 | End: 2017-07-19

## 2017-02-28 NOTE — TELEPHONE ENCOUNTER
Pt calling for new 90 day Rx of Labetalol HCl 300 MG Oral Tab twice daily    Last Rx was given by the hospitalist so she can't call in a refill    Has F/U appt next week    Future Appointments  Date Time Provider Nu Lopez   3/7/2017 1:30 PM Pector

## 2017-03-07 ENCOUNTER — OFFICE VISIT (OUTPATIENT)
Dept: FAMILY MEDICINE CLINIC | Facility: CLINIC | Age: 82
End: 2017-03-07

## 2017-03-07 VITALS
RESPIRATION RATE: 16 BRPM | HEART RATE: 66 BPM | SYSTOLIC BLOOD PRESSURE: 116 MMHG | OXYGEN SATURATION: 98 % | HEIGHT: 63 IN | DIASTOLIC BLOOD PRESSURE: 58 MMHG | BODY MASS INDEX: 25.45 KG/M2 | TEMPERATURE: 98 F | WEIGHT: 143.63 LBS

## 2017-03-07 DIAGNOSIS — Z13.0 SCREENING FOR DEFICIENCY ANEMIA: ICD-10-CM

## 2017-03-07 DIAGNOSIS — Z79.899 ON AMIODARONE THERAPY: Primary | ICD-10-CM

## 2017-03-07 DIAGNOSIS — E87.1 HYPONATREMIA: ICD-10-CM

## 2017-03-07 DIAGNOSIS — E78.5 HYPERLIPIDEMIA, UNSPECIFIED HYPERLIPIDEMIA TYPE: ICD-10-CM

## 2017-03-07 DIAGNOSIS — E53.8 VITAMIN B12 DEFICIENCY: ICD-10-CM

## 2017-03-07 DIAGNOSIS — Z51.81 MEDICATION MONITORING ENCOUNTER: ICD-10-CM

## 2017-03-07 DIAGNOSIS — I48.0 PAROXYSMAL ATRIAL FIBRILLATION (HCC): ICD-10-CM

## 2017-03-07 DIAGNOSIS — I10 ESSENTIAL HYPERTENSION: ICD-10-CM

## 2017-03-07 PROBLEM — I69.30 CHRONIC ISCHEMIC MULTIFOCAL MULTIPLE VASCULAR TERRITORIES STROKE: Status: ACTIVE | Noted: 2017-03-07

## 2017-03-07 PROBLEM — Z86.73 CHRONIC ISCHEMIC MULTIFOCAL MULTIPLE VASCULAR TERRITORIES STROKE: Status: ACTIVE | Noted: 2017-03-07

## 2017-03-07 PROBLEM — D53.9 MACROCYTIC ANEMIA: Status: ACTIVE | Noted: 2017-03-07

## 2017-03-07 PROCEDURE — 99214 OFFICE O/P EST MOD 30 MIN: CPT | Performed by: FAMILY MEDICINE

## 2017-03-07 NOTE — PATIENT INSTRUCTIONS
Call Dr. Jerry Miller to ask if it is necessary to do the followup CT scan on the brain & carotids. Get labs done in mid April, then see us for your scheduled well visit.      You can stop the B1, will check B12 blood test.

## 2017-03-07 NOTE — PROGRESS NOTES
Kamran Rodríguez IS A [de-identified]year old female [facility] [place] Patient presents with: Follow - Up: on hernia surgery and lab requests       History of present illness:     Needs to do more walking. Some left sided coordination problems with walking.  Hasn't done the exerc Losartan Potassium 100 MG Oral Tab Take 1 tablet (100 mg total) by mouth daily. Disp: 30 tablet Rfl: 0   Atorvastatin Calcium 40 MG Oral Tab Take 1 tablet (40 mg total) by mouth nightly.  Disp: 30 tablet Rfl: 1   amiodarone HCl 200 MG Oral Tab Take 1 tabl Resp 16  Ht 63\"  Wt 143 lb 9.6 oz  BMI 25.44 kg/m2  SpO2 98%       Thyroid normal, no adenopathy. Chest clear without rhonchi, rales. No chest wall discomfort. Heart regular rate and rhythm, S1 and S2 normal, no S3 S4 or murmur.   Abdomen soft nontender Instructions   Call Dr. Joselito Tello to ask if it is necessary to do the followup CT scan on the brain & carotids. Get labs done in mid April, then see us for your scheduled well visit.      You can stop the B1, will check B12 blood test.

## 2017-03-16 ENCOUNTER — PRIOR ORIGINAL RECORDS (OUTPATIENT)
Dept: OTHER | Age: 82
End: 2017-03-16

## 2017-03-22 ENCOUNTER — TELEPHONE (OUTPATIENT)
Dept: NEUROLOGY | Facility: CLINIC | Age: 82
End: 2017-03-22

## 2017-03-22 DIAGNOSIS — Z86.73 HISTORY OF STROKE: Primary | ICD-10-CM

## 2017-03-22 NOTE — TELEPHONE ENCOUNTER
Patient contacted [facility] office returning Dr. Angela Juarez call. The patient would like to speak to Dr. Gerson Graham directly to address concerns. Routed to provider.

## 2017-03-22 NOTE — TELEPHONE ENCOUNTER
I attempted to call MsLinus Bety Jenkins on the phone both cell and home numbers) regarding concerns over repeat CT imaging. There was no answer at either number and I left a message on her home phone for her to call back to clinic with any concerns.

## 2017-03-22 NOTE — TELEPHONE ENCOUNTER
Hi Ladies,    I did a stroke call back on Peg Leigh ( 1935) on 3/12 and she had some questions for Dr. Joshua Waters. The patient stated that he had wanted her to follow up with another CTA in the future.   The patient is concerned about radiation e

## 2017-03-24 ENCOUNTER — TELEPHONE (OUTPATIENT)
Dept: NEUROLOGY | Facility: CLINIC | Age: 82
End: 2017-03-24

## 2017-03-24 NOTE — TELEPHONE ENCOUNTER
I spoke to Lacie Rosa Else on the phone regarding her CTA head and neck. She states that she is unwilling to have a CTA of the head and neck due to fears of radiation.  I would like to have intracranial vascular imaging as there is none here on file and she has h

## 2017-03-27 ENCOUNTER — TELEPHONE (OUTPATIENT)
Dept: SURGERY | Facility: CLINIC | Age: 82
End: 2017-03-27

## 2017-03-27 NOTE — TELEPHONE ENCOUNTER
Started [address]eb Chappell MA online for MRA Neck (cpt code 82685), MRA Brain (cpt code 68945)  Called insurance to verify. Spoke with rep Wei Tabor.   Rep states no prior auth required  MRA Neck (cpt code 27582)--Case# [id number]  MRA Brain (cpt code 36560)--Case# [id number]

## 2017-04-03 ENCOUNTER — TELEPHONE (OUTPATIENT)
Dept: NEUROLOGY | Facility: CLINIC | Age: 82
End: 2017-04-03

## 2017-04-03 ENCOUNTER — HOSPITAL ENCOUNTER (OUTPATIENT)
Dept: MRI IMAGING | Facility: HOSPITAL | Age: 82
Discharge: HOME | End: 2017-04-03
Attending: Other
Payer: MEDICARE

## 2017-04-03 DIAGNOSIS — Z86.73 HISTORY OF STROKE: ICD-10-CM

## 2017-04-03 PROCEDURE — 70544 MR ANGIOGRAPHY HEAD W/O DYE: CPT

## 2017-04-03 NOTE — TELEPHONE ENCOUNTER
Patient informed of below. Patient asking if she still needs to have MRA of the neck. It is scheduled 4/5/17.

## 2017-04-03 NOTE — TELEPHONE ENCOUNTER
----- Message from Keegan Rowley DO sent at 4/3/2017 11:13 AM CDT -----  MRA reviewed. No acute process noted. R ICA stenosis redemonstrated which has been seen previously.

## 2017-04-04 ENCOUNTER — PRIOR ORIGINAL RECORDS (OUTPATIENT)
Dept: OTHER | Age: 82
End: 2017-04-04

## 2017-04-04 ENCOUNTER — LAB ENCOUNTER (OUTPATIENT)
Dept: LAB | Age: 82
End: 2017-04-04
Attending: FAMILY MEDICINE
Payer: MEDICARE

## 2017-04-04 DIAGNOSIS — I48.0 PAROXYSMAL ATRIAL FIBRILLATION (HCC): ICD-10-CM

## 2017-04-04 DIAGNOSIS — E87.1 HYPONATREMIA: ICD-10-CM

## 2017-04-04 DIAGNOSIS — E78.5 HYPERLIPIDEMIA, UNSPECIFIED HYPERLIPIDEMIA TYPE: ICD-10-CM

## 2017-04-04 DIAGNOSIS — Z51.81 MEDICATION MONITORING ENCOUNTER: ICD-10-CM

## 2017-04-04 DIAGNOSIS — Z13.0 SCREENING FOR DEFICIENCY ANEMIA: ICD-10-CM

## 2017-04-04 DIAGNOSIS — I10 ESSENTIAL HYPERTENSION: ICD-10-CM

## 2017-04-04 DIAGNOSIS — Z79.899 ON AMIODARONE THERAPY: ICD-10-CM

## 2017-04-04 DIAGNOSIS — E53.8 VITAMIN B12 DEFICIENCY: ICD-10-CM

## 2017-04-04 PROCEDURE — 85025 COMPLETE CBC W/AUTO DIFF WBC: CPT | Performed by: FAMILY MEDICINE

## 2017-04-04 PROCEDURE — 82607 VITAMIN B-12: CPT | Performed by: FAMILY MEDICINE

## 2017-04-04 PROCEDURE — 36415 COLL VENOUS BLD VENIPUNCTURE: CPT | Performed by: FAMILY MEDICINE

## 2017-04-04 PROCEDURE — 84443 ASSAY THYROID STIM HORMONE: CPT | Performed by: FAMILY MEDICINE

## 2017-04-04 PROCEDURE — 80061 LIPID PANEL: CPT | Performed by: FAMILY MEDICINE

## 2017-04-04 PROCEDURE — 80053 COMPREHEN METABOLIC PANEL: CPT | Performed by: FAMILY MEDICINE

## 2017-04-04 NOTE — TELEPHONE ENCOUNTER
MRA neck does not need to be completed at this time. She has had previous CUS. MRA Brain was due to not having any intracranial imagine with multiple strokes.

## 2017-04-06 ENCOUNTER — OFFICE VISIT (OUTPATIENT)
Dept: NEUROLOGY | Facility: CLINIC | Age: 82
End: 2017-04-06

## 2017-04-06 ENCOUNTER — TELEPHONE (OUTPATIENT)
Dept: FAMILY MEDICINE CLINIC | Facility: CLINIC | Age: 82
End: 2017-04-06

## 2017-04-06 VITALS
SYSTOLIC BLOOD PRESSURE: 136 MMHG | WEIGHT: 144 LBS | RESPIRATION RATE: 18 BRPM | HEART RATE: 62 BPM | BODY MASS INDEX: 26 KG/M2 | DIASTOLIC BLOOD PRESSURE: 60 MMHG

## 2017-04-06 DIAGNOSIS — I69.30 CHRONIC ISCHEMIC MULTIFOCAL MULTIPLE VASCULAR TERRITORIES STROKE: Primary | ICD-10-CM

## 2017-04-06 PROBLEM — I63.9 CARDIOEMBOLIC STROKE (HCC): Status: ACTIVE | Noted: 2017-04-06

## 2017-04-06 PROBLEM — D51.8 MACROCYTIC ANEMIA WITH VITAMIN B12 DEFICIENCY: Status: ACTIVE | Noted: 2017-04-06

## 2017-04-06 PROCEDURE — 99213 OFFICE O/P EST LOW 20 MIN: CPT | Performed by: OTHER

## 2017-04-06 NOTE — TELEPHONE ENCOUNTER
Staff message exchange with Dr. Orlando Spence to clarify reason for patient's stroke: \"Eunetta strokes are likely caused by a cardio embolic source (Afib) however the R ICA stenosis may have contributed as well.  Its impossible to say the degree of contribution

## 2017-04-06 NOTE — PROGRESS NOTES
Neurology H&P    Carlos Kiran Patient Status:  No patient class for patient encounter    1935 MRN [id number]   Location [facility], [address] [place] [address] Drive, [address] [facility] Attending No att. providers found   [facility] Day #  PCP MD Rianna Tabor total) by mouth daily. Disp: 30 tablet Rfl: 0   Atorvastatin Calcium 40 MG Oral Tab Take 1 tablet (40 mg total) by mouth nightly. Disp: 30 tablet Rfl: 1   amiodarone HCl 200 MG Oral Tab Take 1 tablet (200 mg total) by mouth daily.  Disp: 30 tablet Rfl: 5 02/06/17       SocHx:    Smoking Status: Former Smoker                   Packs/Day: 0.00  Years:           Quit date: 01/01/1995    Smokeless Status: Never Used                        Alcohol Use: No                Family History:  Family History   Problem MARLEE    REFLEXES: 3+ at biceps, 2+ triceps, 2+ at patella    GAIT: normal stance, normal toe gait, tandem well. Imaging:  MRI Brain 1/6/17  =====  CONCLUSION:  Scattered infarcts. the right cerebral hemisphere along with a more localized region the segment is noted.      Assessment: This is a 81 y/o female with a recent R MCA stroke and R ICA stenosis. She has a L VF cut and mild L NLF flattening and mild L deltoid weakness, but an otherwise non-focal exam today.  She is compliant with Abixaban and A

## 2017-04-06 NOTE — PATIENT INSTRUCTIONS
Refill policies:    • Allow 2 business days for refills; controlled substances may take longer.   • Contact your pharmacy at least 5 days prior to running out of medication and have them send an electronic request or submit request through the “request re insurance carrier to obtain pre-certification or prior authorization. Unfortunately, [employer] has seen an increase in denial of payment even though the procedure/test has been pre-certified.   You are strongly encouraged to contact your insurance carrier to v

## 2017-04-07 ENCOUNTER — PRIOR ORIGINAL RECORDS (OUTPATIENT)
Dept: OTHER | Age: 82
End: 2017-04-07

## 2017-04-10 ENCOUNTER — TELEPHONE (OUTPATIENT)
Dept: FAMILY MEDICINE CLINIC | Facility: CLINIC | Age: 82
End: 2017-04-10

## 2017-04-10 NOTE — TELEPHONE ENCOUNTER
Neuro has said [place] and [place] can go back to water aerobics. Can Dr Adalberto Mason write a letter? Sent to Triage .

## 2017-04-10 NOTE — TELEPHONE ENCOUNTER
Patient wants note faxed to [facility] f [phone].      Future Appointments  Date Time Provider Nu Lopez   4/19/2017 9:30 AM Jose Anderson MD [facility] [address]   6/26/2017 10:00 AM DO MILTON Siddiqui [facility] [place]

## 2017-04-11 ENCOUNTER — PRIOR ORIGINAL RECORDS (OUTPATIENT)
Dept: OTHER | Age: 82
End: 2017-04-11

## 2017-04-12 LAB
ALBUMIN: 3.6 G/DL
ALKALINE PHOSPHATATE(ALK PHOS): 86 IU/L
BILIRUBIN TOTAL: 1.3 MG/DL
BUN: 10 MG/DL
CALCIUM: 9.3 MG/DL
CHLORIDE: 103 MEQ/L
CHOLESTEROL, TOTAL: 154 MG/DL
CREATININE, SERUM: 0.88 MG/DL
GLUCOSE: 82 MG/DL
HDL CHOLESTEROL: 99 MG/DL
HEMATOCRIT: 35.1 %
HEMOGLOBIN: 11.5 G/DL
LDL CHOLESTEROL: 42 MG/DL
PLATELETS: 360 K/UL
POTASSIUM, SERUM: 4.4 MEQ/L
PROTEIN, TOTAL: 8.1 G/DL
RED BLOOD COUNT: 3.45 X 10-6/U
SGOT (AST): 28 IU/L
SGPT (ALT): 27 IU/L
SODIUM: 136 MEQ/L
TRIGLYCERIDES: 66 MG/DL
WHITE BLOOD COUNT: 5.6 X 10-3/U

## 2017-04-13 LAB
ALT (SGPT): 27 U/L
AST (SGOT): 28 U/L
THYROID STIMULATING HORMONE: 2.32 MLU/L

## 2017-04-14 ENCOUNTER — MA CHART PREP (OUTPATIENT)
Dept: FAMILY MEDICINE CLINIC | Facility: CLINIC | Age: 82
End: 2017-04-14

## 2017-04-14 PROBLEM — Z98.890 H/O UMBILICAL HERNIA REPAIR: Status: ACTIVE | Noted: 2017-04-14

## 2017-04-14 PROBLEM — I77.810 ECTATIC THORACIC AORTA (HCC): Status: ACTIVE | Noted: 2017-04-14

## 2017-04-14 PROBLEM — I77.810 ECTATIC THORACIC AORTA: Status: ACTIVE | Noted: 2017-04-14

## 2017-04-14 PROBLEM — Z87.19 H/O UMBILICAL HERNIA REPAIR: Status: ACTIVE | Noted: 2017-04-14

## 2017-04-19 ENCOUNTER — OFFICE VISIT (OUTPATIENT)
Dept: FAMILY MEDICINE CLINIC | Facility: CLINIC | Age: 82
End: 2017-04-19

## 2017-04-19 VITALS
RESPIRATION RATE: 16 BRPM | WEIGHT: 143 LBS | HEIGHT: 62 IN | OXYGEN SATURATION: 97 % | DIASTOLIC BLOOD PRESSURE: 52 MMHG | SYSTOLIC BLOOD PRESSURE: 108 MMHG | HEART RATE: 67 BPM | BODY MASS INDEX: 26.31 KG/M2 | TEMPERATURE: 98 F

## 2017-04-19 DIAGNOSIS — R73.09 ELEVATED HEMOGLOBIN A1C: ICD-10-CM

## 2017-04-19 DIAGNOSIS — Z12.31 VISIT FOR SCREENING MAMMOGRAM: ICD-10-CM

## 2017-04-19 DIAGNOSIS — L93.0 DISCOID LUPUS: Primary | ICD-10-CM

## 2017-04-19 DIAGNOSIS — Z12.11 SCREENING FOR COLON CANCER: ICD-10-CM

## 2017-04-19 DIAGNOSIS — I10 ESSENTIAL HYPERTENSION: ICD-10-CM

## 2017-04-19 DIAGNOSIS — Z78.0 POSTMENOPAUSAL: ICD-10-CM

## 2017-04-19 DIAGNOSIS — E87.1 HYPONATREMIA: ICD-10-CM

## 2017-04-19 DIAGNOSIS — H26.9 CATARACT OF BOTH EYES, UNSPECIFIED CATARACT TYPE: ICD-10-CM

## 2017-04-19 DIAGNOSIS — J45.20 MILD INTERMITTENT ASTHMA WITHOUT COMPLICATION: ICD-10-CM

## 2017-04-19 DIAGNOSIS — Z00.00 ENCOUNTER FOR ANNUAL HEALTH EXAMINATION: ICD-10-CM

## 2017-04-19 PROBLEM — I69.354 HEMIPARESIS AFFECTING LEFT SIDE AS LATE EFFECT OF STROKE (HCC): Status: ACTIVE | Noted: 2017-04-19

## 2017-04-19 PROBLEM — J18.9 COMMUNITY ACQUIRED PNEUMONIA: Status: RESOLVED | Noted: 2017-01-03 | Resolved: 2017-04-19

## 2017-04-19 PROBLEM — I70.0 THORACIC AORTA ATHEROSCLEROSIS (HCC): Status: ACTIVE | Noted: 2017-04-19

## 2017-04-19 PROBLEM — R76.8 POSITIVE ANA (ANTINUCLEAR ANTIBODY): Status: ACTIVE | Noted: 2017-04-19

## 2017-04-19 PROBLEM — Z85.42 HISTORY OF ENDOMETRIAL CANCER: Status: ACTIVE | Noted: 2017-04-19

## 2017-04-19 PROBLEM — I70.0 THORACIC AORTA ATHEROSCLEROSIS: Status: ACTIVE | Noted: 2017-04-19

## 2017-04-19 PROBLEM — K42.0 INCARCERATED UMBILICAL HERNIA: Status: RESOLVED | Noted: 2017-01-31 | Resolved: 2017-04-19

## 2017-04-19 PROCEDURE — G0439 PPPS, SUBSEQ VISIT: HCPCS | Performed by: FAMILY MEDICINE

## 2017-04-19 PROCEDURE — 99397 PER PM REEVAL EST PAT 65+ YR: CPT | Performed by: FAMILY MEDICINE

## 2017-04-19 PROCEDURE — 96160 PT-FOCUSED HLTH RISK ASSMT: CPT | Performed by: FAMILY MEDICINE

## 2017-04-19 NOTE — PATIENT INSTRUCTIONS
Mammogram & bone density and labs in about 3 months. Stool test for blood in stool in about 3 months. Recheck with me after labs.      James Ramírez's SCREENING SCHEDULE   Tests on this list are recommended by your physician but may not be covered, or c patients who meet one of the following criteria:   • Men who are 73-68 years old and have smoked more than 100 cigarettes in their lifetime   • Anyone with a family history   N/A   Colorectal Cancer Screening  Covered up to Age 76     Colonoscopy Screen Melody Pin due on 01/01/2012 Please get this Mammogram regularly  ordered   Immunizations      Influenza  Covered Annually   Orders placed or performed in visit on 11/04/13  -FLU VACC PRSV FREE INC ANTIG    Please get every year    Pneumococcal 13 (Pre

## 2017-04-19 NOTE — PROGRESS NOTES
HPI:   Carmina Edwards is a [de-identified]year old female who presents for a MA (Medicare Advantage) [facility] [facility] (Once per calendar year). Here for MA annual wellness   Annual Physical due on 07/13/2017     Mild anemia recently.    Dermatologist dx pt with Discoid l assessment.      Last Cholesterol Labs:     Lab Results  Component Value Date   CHOLEST 154 04/04/2017   HDL 99 04/04/2017   LDL 42 04/04/2017   TRIG 66 04/04/2017          Last Chemistry Labs:     Lab Results  Component Value Date   AST 28 04/04/2017   ALT hernia repair (N/A, 2/6/2017); and hernia surgery (02/06/17). Her family history includes Diabetes in her brother, father, and mother. SOCIAL HISTORY:   She  reports that she quit smoking about 22 years ago.  She has never used smokeless tobacco. She r (can't make out words)     Visual Acuity                           General Appearance:  Alert, cooperative, no distress, appears stated age   Head:  Normocephalic, without obvious abnormality, atraumatic   Eyes:  PERRL, conjunctiva/corneas clear, arcus sen needed soon. Status post total replacement of right hip--in 2000, current ADA & AOA criteria indicate prophylaxis before dental work NOT indicated. Right thigh pain with stable hip.      Hyponatremia--chronic, recently adequately controlled, 136     De mild cataracts. Discoid lupus. Advised sunscreen and avoiding sun exposure.      PLAN SUMMARY:   Diagnoses and all orders for this visit:    Discoid lupus    Cataract of both eyes, unspecified cataract type    Encounter for annual health examination    Vi female age 47-67, do you take aspirin?: No    Have you had any immunizations at another office such as Influenza, Hepatitis B, Tetanus, or Pneumococcal?: No     Functional Ability     Bathing or Showering: Able without help    Toileting: Able without help year is it?: Correct    Recall \"Ball\": Correct    Recall \"Flag\": Correct    Recall \"Tree\": Correct       This section provided for quick review of chart, separate sheet to patient  PREVENTATIVE SERVICES  INDICATIONS AND SCHEDULE Internal Lab or Proce Activity if applicable)     Influenza  Covered Annually 1/10/2017 Please get every year    Pneumococcal 13 (Prevnar)  Covered Once after 65 07/13/2016 Please get once after your 65th birthday    Pneumococcal 23 (Pneumovax)  Covered Once after 65 No vaccine No flowsheet data found. COPD      Spirometry Testing Annually Spirometry date: 01/16/2017 No flowsheet data found.          Template: DARNELL RENEE MEDICARE ANNUAL ASSESSMENT FEMALE [53674]

## 2017-04-26 PROBLEM — E86.0 DEHYDRATION: Status: RESOLVED | Noted: 2017-01-03 | Resolved: 2017-04-26

## 2017-05-05 RX ORDER — LOSARTAN POTASSIUM 100 MG/1
TABLET ORAL
Qty: 90 TABLET | Refills: 0 | Status: SHIPPED | OUTPATIENT
Start: 2017-05-05 | End: 2017-07-19

## 2017-05-05 NOTE — TELEPHONE ENCOUNTER
Future Appointments  Date Time Provider Nu Lopez   6/26/2017 10:00 AM Jose A Diaz DO [facility] [place]   7/19/2017 10:30 AM Maximiliano Jimenez MD [facility] [address]     LOV 4/17     LAST LAB 4/17    LAST RX   Losartan Potassium 100 MG Ora

## 2017-05-08 ENCOUNTER — APPOINTMENT (OUTPATIENT)
Dept: LAB | Age: 82
End: 2017-05-08
Attending: FAMILY MEDICINE
Payer: MEDICARE

## 2017-05-10 ENCOUNTER — TELEPHONE (OUTPATIENT)
Dept: FAMILY MEDICINE CLINIC | Facility: CLINIC | Age: 82
End: 2017-05-10

## 2017-05-10 ENCOUNTER — PRIOR ORIGINAL RECORDS (OUTPATIENT)
Dept: OTHER | Age: 82
End: 2017-05-10

## 2017-05-10 ENCOUNTER — PATIENT MESSAGE (OUTPATIENT)
Dept: FAMILY MEDICINE CLINIC | Facility: CLINIC | Age: 82
End: 2017-05-10

## 2017-05-10 PROBLEM — J44.89 ASTHMA WITH COPD (CHRONIC OBSTRUCTIVE PULMONARY DISEASE) (HCC): Chronic | Status: ACTIVE | Noted: 2017-05-10

## 2017-05-10 PROBLEM — J44.9 ASTHMA WITH COPD (CHRONIC OBSTRUCTIVE PULMONARY DISEASE) (HCC): Chronic | Status: ACTIVE | Noted: 2017-05-10

## 2017-05-10 PROBLEM — J44.89 ASTHMA WITH COPD (CHRONIC OBSTRUCTIVE PULMONARY DISEASE): Chronic | Status: ACTIVE | Noted: 2017-05-10

## 2017-05-10 PROBLEM — J44.9 ASTHMA WITH COPD (CHRONIC OBSTRUCTIVE PULMONARY DISEASE): Chronic | Status: ACTIVE | Noted: 2017-05-10

## 2017-05-10 NOTE — TELEPHONE ENCOUNTER
LOV 4/17 108/52  109/59 122/73  Also in the 130's. . Advised to continue to monitor BP and to bring log to appointment.      Future Appointments  Date Time Provider Nu Lopez   6/26/2017 10:00 AM Divine Garcia DO [facility] JAZMYNE Melissa   7/19/201

## 2017-05-10 NOTE — TELEPHONE ENCOUNTER
Left message on front vm. Has questions on BP medication; BP has been lower so she thinks dosage is too strong. Pls call.

## 2017-05-11 NOTE — TELEPHONE ENCOUNTER
From: Osman Pickett  To: John Long MD  Sent: 5/10/2017 4:37 PM CDT  Subject: Prescription Question    Message received for losartan 100mg tab not needed at this time. 90 day refill on 3/31/17.

## 2017-05-12 ENCOUNTER — HOSPITAL ENCOUNTER (OUTPATIENT)
Dept: LAB | Facility: HOSPITAL | Age: 82
Discharge: HOME | End: 2017-05-12
Attending: INTERNAL MEDICINE
Payer: MEDICARE

## 2017-05-12 ENCOUNTER — MYAURORA ACCOUNT LINK (OUTPATIENT)
Dept: OTHER | Age: 82
End: 2017-05-12

## 2017-05-12 ENCOUNTER — HOSPITAL ENCOUNTER (OUTPATIENT)
Dept: CV DIAGNOSTICS | Facility: HOSPITAL | Age: 82
Discharge: HOME | End: 2017-05-12
Attending: INTERNAL MEDICINE

## 2017-05-12 ENCOUNTER — PRIOR ORIGINAL RECORDS (OUTPATIENT)
Dept: OTHER | Age: 82
End: 2017-05-12

## 2017-05-12 DIAGNOSIS — R06.02 SHORTNESS OF BREATH: ICD-10-CM

## 2017-05-12 DIAGNOSIS — R07.9 CHEST PAIN, UNSPECIFIED: ICD-10-CM

## 2017-05-12 LAB
ALBUMIN: 3.1 G/DL
ALKALINE PHOSPHATATE(ALK PHOS): 94 IU/L
ALT (SGPT): 36 U/L
AST (SGOT): 34 U/L
BILIRUBIN TOTAL: 0.6 MG/DL
BNP: 471 PMOL/L
BUN: 7 MG/DL
CALCIUM: 9.4 MG/DL
CHLORIDE: 104 MEQ/L
CREATININE, SERUM: 0.83 MG/DL
GLUCOSE: 93 MG/DL
GLUCOSE: 93 MG/DL
HEMATOCRIT: 31.3 %
HEMOGLOBIN: 10.2 G/DL
PLATELETS: 392 K/UL
POTASSIUM, SERUM: 4.6 MEQ/L
PROTEIN, TOTAL: 8 G/DL
RED BLOOD COUNT: 3.07 X 10-6/U
SGOT (AST): 34 IU/L
SGPT (ALT): 36 IU/L
SODIUM: 138 MEQ/L
THYROID STIMULATING HORMONE: 1.26 MLU/L
WHITE BLOOD COUNT: 5.2 X 10-3/U

## 2017-05-12 PROCEDURE — 85025 COMPLETE CBC W/AUTO DIFF WBC: CPT | Performed by: INTERNAL MEDICINE

## 2017-05-12 PROCEDURE — 36415 COLL VENOUS BLD VENIPUNCTURE: CPT | Performed by: INTERNAL MEDICINE

## 2017-05-12 PROCEDURE — 80053 COMPREHEN METABOLIC PANEL: CPT | Performed by: INTERNAL MEDICINE

## 2017-05-12 PROCEDURE — 83880 ASSAY OF NATRIURETIC PEPTIDE: CPT | Performed by: INTERNAL MEDICINE

## 2017-05-12 PROCEDURE — 84443 ASSAY THYROID STIM HORMONE: CPT | Performed by: INTERNAL MEDICINE

## 2017-05-13 ENCOUNTER — HOSPITAL ENCOUNTER (OUTPATIENT)
Dept: CV DIAGNOSTICS | Facility: HOSPITAL | Age: 82
Discharge: HOME | End: 2017-05-13
Attending: INTERNAL MEDICINE
Payer: MEDICARE

## 2017-05-13 DIAGNOSIS — R06.02 SOB (SHORTNESS OF BREATH): ICD-10-CM

## 2017-05-13 DIAGNOSIS — I10 HTN (HYPERTENSION): ICD-10-CM

## 2017-05-13 DIAGNOSIS — I48.91 A-FIB (HCC): ICD-10-CM

## 2017-05-13 PROCEDURE — 78452 HT MUSCLE IMAGE SPECT MULT: CPT | Performed by: INTERNAL MEDICINE

## 2017-05-13 PROCEDURE — 93018 CV STRESS TEST I&R ONLY: CPT | Performed by: INTERNAL MEDICINE

## 2017-05-13 PROCEDURE — 93017 CV STRESS TEST TRACING ONLY: CPT | Performed by: INTERNAL MEDICINE

## 2017-05-15 ENCOUNTER — PRIOR ORIGINAL RECORDS (OUTPATIENT)
Dept: OTHER | Age: 82
End: 2017-05-15

## 2017-05-17 ENCOUNTER — HOSPITAL ENCOUNTER (OUTPATIENT)
Dept: LAB | Facility: HOSPITAL | Age: 82
Discharge: HOME | End: 2017-05-17
Attending: INTERNAL MEDICINE
Payer: MEDICARE

## 2017-05-17 PROCEDURE — 80048 BASIC METABOLIC PNL TOTAL CA: CPT | Performed by: INTERNAL MEDICINE

## 2017-05-17 PROCEDURE — 36415 COLL VENOUS BLD VENIPUNCTURE: CPT | Performed by: INTERNAL MEDICINE

## 2017-05-18 ENCOUNTER — MYAURORA ACCOUNT LINK (OUTPATIENT)
Dept: OTHER | Age: 82
End: 2017-05-18

## 2017-05-18 ENCOUNTER — PRIOR ORIGINAL RECORDS (OUTPATIENT)
Dept: OTHER | Age: 82
End: 2017-05-18

## 2017-05-18 LAB
BUN: 12 MG/DL
CALCIUM: 9.2 MG/DL
CHLORIDE: 103 MEQ/L
CREATININE, SERUM: 1 MG/DL
GLUCOSE: 91 MG/DL
POTASSIUM, SERUM: 4.4 MEQ/L
SODIUM: 135 MEQ/L

## 2017-05-20 ENCOUNTER — PRIOR ORIGINAL RECORDS (OUTPATIENT)
Dept: OTHER | Age: 82
End: 2017-05-20

## 2017-06-26 ENCOUNTER — OFFICE VISIT (OUTPATIENT)
Dept: NEUROLOGY | Facility: CLINIC | Age: 82
End: 2017-06-26

## 2017-06-26 VITALS
HEART RATE: 63 BPM | WEIGHT: 146 LBS | DIASTOLIC BLOOD PRESSURE: 65 MMHG | BODY MASS INDEX: 27 KG/M2 | SYSTOLIC BLOOD PRESSURE: 134 MMHG | RESPIRATION RATE: 20 BRPM

## 2017-06-26 DIAGNOSIS — I69.30 CHRONIC ISCHEMIC MULTIFOCAL MULTIPLE VASCULAR TERRITORIES STROKE: Primary | ICD-10-CM

## 2017-06-26 PROCEDURE — 99213 OFFICE O/P EST LOW 20 MIN: CPT | Performed by: OTHER

## 2017-06-26 RX ORDER — FUROSEMIDE 20 MG/1
20 TABLET ORAL DAILY
COMMUNITY
Start: 2017-06-06 | End: 2017-10-20

## 2017-06-26 RX ORDER — ATORVASTATIN CALCIUM 40 MG/1
40 TABLET, FILM COATED ORAL NIGHTLY
Qty: 30 TABLET | Refills: 6 | Status: SHIPPED | OUTPATIENT
Start: 2017-06-26 | End: 2017-10-20

## 2017-06-26 NOTE — PROGRESS NOTES
Neurology H&P    Kamran Rodríguez Patient Status:  No patient class for patient encounter    1935 MRN [id number]   Location [facility], [address], Addi Kulkarni Attending No att. providers found   [facility] Day #  PCP MD Marcial Joy every 6 (six) hours as needed for Pain. Disp:  Rfl:    Atorvastatin Calcium 40 MG Oral Tab Take 1 tablet (40 mg total) by mouth nightly. Disp: 30 tablet Rfl: 1   spironolactone 25 MG Oral Tab Take 0.5 tablets (12.5 mg total) by mouth daily.  Disp: 30 tablet Surgical History:  11/09: BREAST LUMPECTOMY      Comment: benign fibroadenoma  02/06/17: HERNIA SURGERY  1993: HYSTERECTOMY      Comment: Kandace Tejada, [place], [place]  07/00: TOTAL HIP REPLACEMENT      Comment: at [facility], [address], right  3/7[birth year]16: UMBILICAL no ptosis or diplopia, EOM intact, L VF cut in upper and lower quadrants, facial sensation intact, strong eye closure, L NLF flattening, shrug intact    MOTOR EXAMINATION: normal tone, no fasciculations, normal strength throughout in RUE and BLE, LUE 5/5 d thickness leaflets.     Transvalvular velocity was within the normal range. There was no evidence     for stenosis. There was mild, 1-2+ regurgitation.   4. Pulmonary arteries: Systolic pressure was mildly to moderately increased,     estimated to be 45mm H

## 2017-06-26 NOTE — PATIENT INSTRUCTIONS
Refill policies:    • Allow 2-3 business days for refills; controlled substances may take longer.   • Contact your pharmacy at least 5 days prior to running out of medication and have them send an electronic request or submit request through the [facility] have a procedure or additional testing performed. [facility]) will contact your insurance carrier to obtain pre-certification or prior authorization.     Unfortunately, [employer] has seen an increase in denial of payment even though the p

## 2017-07-19 ENCOUNTER — OFFICE VISIT (OUTPATIENT)
Dept: FAMILY MEDICINE CLINIC | Facility: CLINIC | Age: 82
End: 2017-07-19

## 2017-07-19 ENCOUNTER — HOSPITAL ENCOUNTER (OUTPATIENT)
Dept: MAMMOGRAPHY | Age: 82
Discharge: HOME | End: 2017-07-19
Attending: FAMILY MEDICINE
Payer: MEDICARE

## 2017-07-19 ENCOUNTER — PRIOR ORIGINAL RECORDS (OUTPATIENT)
Dept: OTHER | Age: 82
End: 2017-07-19

## 2017-07-19 ENCOUNTER — HOSPITAL ENCOUNTER (OUTPATIENT)
Dept: BONE DENSITY | Age: 82
Discharge: HOME | End: 2017-07-19
Attending: FAMILY MEDICINE
Payer: MEDICARE

## 2017-07-19 VITALS
DIASTOLIC BLOOD PRESSURE: 70 MMHG | OXYGEN SATURATION: 98 % | TEMPERATURE: 99 F | WEIGHT: 145 LBS | HEIGHT: 62 IN | SYSTOLIC BLOOD PRESSURE: 120 MMHG | BODY MASS INDEX: 26.68 KG/M2 | HEART RATE: 66 BPM

## 2017-07-19 DIAGNOSIS — D51.9 ANEMIA DUE TO VITAMIN B12 DEFICIENCY, UNSPECIFIED B12 DEFICIENCY TYPE: Primary | ICD-10-CM

## 2017-07-19 DIAGNOSIS — I70.0 THORACIC AORTA ATHEROSCLEROSIS (HCC): ICD-10-CM

## 2017-07-19 DIAGNOSIS — Z78.0 POSTMENOPAUSAL: ICD-10-CM

## 2017-07-19 DIAGNOSIS — I69.30 CHRONIC ISCHEMIC MULTIFOCAL MULTIPLE VASCULAR TERRITORIES STROKE: ICD-10-CM

## 2017-07-19 DIAGNOSIS — I48.91 ATRIAL FIBRILLATION, TRANSIENT (HCC): ICD-10-CM

## 2017-07-19 DIAGNOSIS — I10 ESSENTIAL HYPERTENSION: ICD-10-CM

## 2017-07-19 DIAGNOSIS — I27.20 PULMONARY HYPERTENSION, MODERATE TO SEVERE (HCC): ICD-10-CM

## 2017-07-19 DIAGNOSIS — Z12.31 VISIT FOR SCREENING MAMMOGRAM: ICD-10-CM

## 2017-07-19 PROCEDURE — 77080 DXA BONE DENSITY AXIAL: CPT | Performed by: FAMILY MEDICINE

## 2017-07-19 PROCEDURE — 99214 OFFICE O/P EST MOD 30 MIN: CPT | Performed by: FAMILY MEDICINE

## 2017-07-19 PROCEDURE — 77067 SCR MAMMO BI INCL CAD: CPT | Performed by: FAMILY MEDICINE

## 2017-07-19 RX ORDER — SPIRONOLACTONE 25 MG/1
12.5 TABLET ORAL DAILY
Qty: 45 TABLET | Refills: 1 | Status: SHIPPED | OUTPATIENT
Start: 2017-07-19 | End: 2017-10-20

## 2017-07-19 RX ORDER — LOSARTAN POTASSIUM 100 MG/1
TABLET ORAL
Qty: 90 TABLET | Refills: 1 | Status: SHIPPED | OUTPATIENT
Start: 2017-07-19 | End: 2017-10-20

## 2017-07-19 RX ORDER — LABETALOL 300 MG/1
TABLET, FILM COATED ORAL
Qty: 180 TABLET | Refills: 1 | Status: SHIPPED | OUTPATIENT
Start: 2017-07-19 | End: 2017-10-20

## 2017-07-19 NOTE — PROGRESS NOTES
Carmina Edwards IS A [de-identified]year old female [address] [facility] Patient presents with:   Follow - Up: 6 month medication       History of present illness:     Had dyspnea near when she went on vacation, had heart testing that showed normal nuclear stress test, echo with santi HCl 300 MG Oral Tab Take 300 mg by mouth 2 (two) times daily. Disp: 180 tablet Rfl: 1   losartan 100 MG Oral Tab TAKE ONE TABLET BY MOUTH ONCE DAILY Disp: 90 tablet Rfl: 1   spironolactone 25 MG Oral Tab Take 0.5 tablets (12.5 mg total) by mouth daily.  Dis Hazards No    Sleep Concern No    Stress Concern No    Weight Concern No    Special Diet No    Comment: little meat, a lot of veggies, some chicken    Exercise Yes    Comment: 2 days per week, water aerobics    Seat Belt Yes     Social History Narrative continued furosemide. May need visit with Dr. Maylin Blue to reevaluate pulmonary HTN further. Patient Instructions   Blood tests in a month for blood count and B12 level.     Please finish the stool testing cards we gave you, it can help rule out any si

## 2017-07-19 NOTE — PATIENT INSTRUCTIONS
Blood tests in a month for blood count and B12 level. Please finish the stool testing cards we gave you, it can help rule out any silent bleeding from intestinal tract as a reason for anemia. Continue same medications for blood pressure.     I'll talk

## 2017-07-20 PROBLEM — M81.0 OSTEOPOROSIS WITHOUT CURRENT PATHOLOGICAL FRACTURE: Status: ACTIVE | Noted: 2017-07-20

## 2017-07-28 PROBLEM — L84 CALLUS OF FOOT: Status: ACTIVE | Noted: 2017-07-28

## 2017-08-24 ENCOUNTER — TELEPHONE (OUTPATIENT)
Dept: FAMILY MEDICINE CLINIC | Facility: CLINIC | Age: 82
End: 2017-08-24

## 2017-08-24 ENCOUNTER — HOSPITAL ENCOUNTER (OUTPATIENT)
Dept: MAMMOGRAPHY | Facility: HOSPITAL | Age: 82
Discharge: HOME | End: 2017-08-24
Attending: FAMILY MEDICINE
Payer: MEDICARE

## 2017-08-24 DIAGNOSIS — R92.2 INCONCLUSIVE MAMMOGRAM: ICD-10-CM

## 2017-08-24 PROCEDURE — 77066 DX MAMMO INCL CAD BI: CPT | Performed by: FAMILY MEDICINE

## 2017-08-24 NOTE — TELEPHONE ENCOUNTER
Per Dr Madonna De Oliveira MD  Patient needs L breast Bx due to calcifications. Wait for fax to sign.

## 2017-08-24 NOTE — IMAGING NOTE
Asssisted Dr. Jaspreet Riddle with recommendation for a left stereotactic breast biopsy for calcifications. Emotional and educational support provided to pt and spouse. Pt reports h/o stroke 1/2017 and should not stop eliquis. bx  notified of this plan.

## 2017-09-19 ENCOUNTER — PRIOR ORIGINAL RECORDS (OUTPATIENT)
Dept: OTHER | Age: 82
End: 2017-09-19

## 2017-09-20 ENCOUNTER — TELEPHONE (OUTPATIENT)
Dept: FAMILY MEDICINE CLINIC | Facility: CLINIC | Age: 82
End: 2017-09-20

## 2017-09-20 NOTE — TELEPHONE ENCOUNTER
FYI patient is refusing breast biopsy. She will discuss with you at her appointment.    Future Appointments  Date Time Provider Nu Cooki   10/20/2017 10:30 AM Reyna Rapp MD [facility] [address]

## 2017-09-22 ENCOUNTER — LAB ENCOUNTER (OUTPATIENT)
Dept: LAB | Age: 82
End: 2017-09-22
Attending: FAMILY MEDICINE
Payer: MEDICARE

## 2017-09-22 ENCOUNTER — TELEPHONE (OUTPATIENT)
Dept: MAMMOGRAPHY | Facility: HOSPITAL | Age: 82
End: 2017-09-22

## 2017-09-22 DIAGNOSIS — E87.1 HYPONATREMIA: ICD-10-CM

## 2017-09-22 DIAGNOSIS — I10 ESSENTIAL HYPERTENSION: ICD-10-CM

## 2017-09-22 DIAGNOSIS — R73.09 ELEVATED HEMOGLOBIN A1C: ICD-10-CM

## 2017-09-22 DIAGNOSIS — D51.9 ANEMIA DUE TO VITAMIN B12 DEFICIENCY, UNSPECIFIED B12 DEFICIENCY TYPE: ICD-10-CM

## 2017-09-22 LAB
ALBUMIN SERPL-MCNC: 3.5 G/DL (ref 3.5–4.8)
ALP LIVER SERPL-CCNC: 91 U/L (ref 55–142)
ALT SERPL-CCNC: 26 U/L (ref 14–54)
AST SERPL-CCNC: 29 U/L (ref 15–41)
BASOPHILS # BLD AUTO: 0.02 X10(3) UL (ref 0–0.1)
BASOPHILS NFR BLD AUTO: 0.4 %
BILIRUB SERPL-MCNC: 1.3 MG/DL (ref 0.1–2)
BUN BLD-MCNC: 10 MG/DL (ref 8–20)
CALCIUM BLD-MCNC: 9.2 MG/DL (ref 8.3–10.3)
CHLORIDE: 107 MMOL/L (ref 101–111)
CO2: 26 MMOL/L (ref 22–32)
CREAT BLD-MCNC: 0.94 MG/DL (ref 0.55–1.02)
EOSINOPHIL # BLD AUTO: 0.04 X10(3) UL (ref 0–0.3)
EOSINOPHIL NFR BLD AUTO: 0.7 %
ERYTHROCYTE [DISTWIDTH] IN BLOOD BY AUTOMATED COUNT: 13.5 % (ref 11.5–16)
EST. AVERAGE GLUCOSE BLD GHB EST-MCNC: 117 MG/DL (ref 68–126)
GLUCOSE BLD-MCNC: 87 MG/DL (ref 70–99)
HAV AB SERPL IA-ACNC: 478 PG/ML (ref 193–986)
HBA1C MFR BLD HPLC: 5.7 % (ref ?–5.7)
HCT VFR BLD AUTO: 35 % (ref 34–50)
HGB BLD-MCNC: 11.6 G/DL (ref 12–16)
IMMATURE GRANULOCYTE COUNT: 0.01 X10(3) UL (ref 0–1)
IMMATURE GRANULOCYTE RATIO %: 0.2 %
LYMPHOCYTES # BLD AUTO: 1.16 X10(3) UL (ref 0.9–4)
LYMPHOCYTES NFR BLD AUTO: 20.8 %
M PROTEIN MFR SERPL ELPH: 8.1 G/DL (ref 6.1–8.3)
MCH RBC QN AUTO: 33.4 PG (ref 27–33.2)
MCHC RBC AUTO-ENTMCNC: 33.1 G/DL (ref 31–37)
MCV RBC AUTO: 100.9 FL (ref 81–100)
MONOCYTES # BLD AUTO: 0.51 X10(3) UL (ref 0.1–0.6)
MONOCYTES NFR BLD AUTO: 9.2 %
NEUTROPHIL ABS PRELIM: 3.83 X10 (3) UL (ref 1.3–6.7)
NEUTROPHILS # BLD AUTO: 3.83 X10(3) UL (ref 1.3–6.7)
NEUTROPHILS NFR BLD AUTO: 68.7 %
PLATELET # BLD AUTO: 280 10(3)UL (ref 150–450)
POTASSIUM SERPL-SCNC: 4.5 MMOL/L (ref 3.6–5.1)
RBC # BLD AUTO: 3.47 X10(6)UL (ref 3.8–5.1)
RED CELL DISTRIBUTION WIDTH-SD: 50.3 FL (ref 35.1–46.3)
SODIUM SERPL-SCNC: 138 MMOL/L (ref 136–144)
WBC # BLD AUTO: 5.6 X10(3) UL (ref 4–13)

## 2017-09-22 PROCEDURE — 80053 COMPREHEN METABOLIC PANEL: CPT | Performed by: FAMILY MEDICINE

## 2017-09-22 PROCEDURE — 83036 HEMOGLOBIN GLYCOSYLATED A1C: CPT | Performed by: FAMILY MEDICINE

## 2017-09-22 PROCEDURE — 36415 COLL VENOUS BLD VENIPUNCTURE: CPT | Performed by: FAMILY MEDICINE

## 2017-09-22 PROCEDURE — 82607 VITAMIN B-12: CPT | Performed by: FAMILY MEDICINE

## 2017-09-22 PROCEDURE — 85025 COMPLETE CBC W/AUTO DIFF WBC: CPT | Performed by: FAMILY MEDICINE

## 2017-09-22 NOTE — TELEPHONE ENCOUNTER
LM re Blood thinner, (Apixaban), calling to find out if she is holding it and who gave her permission. Prescribed by Dr. Lisa Bob. She is scheduled for a stereotactic breast biopsy on Monday at 1300.

## 2017-09-22 NOTE — TELEPHONE ENCOUNTER
Clarification by Barry Macdonald, that per Anabel Perry RN, \"Pt reports h/o stroke 1/2017 and should not stop eliquis. bx  notified of this plan. \" Dr. Starr Thrasher gave verbal for pt to continue taking her blood thinner and he would do the biopsy. Pt notified.

## 2017-09-25 ENCOUNTER — HOSPITAL ENCOUNTER (OUTPATIENT)
Dept: MAMMOGRAPHY | Facility: HOSPITAL | Age: 82
Discharge: HOME | End: 2017-09-25
Attending: FAMILY MEDICINE
Payer: MEDICARE

## 2017-09-25 DIAGNOSIS — R92.1 BREAST CALCIFICATIONS: ICD-10-CM

## 2017-09-25 PROCEDURE — 88305 TISSUE EXAM BY PATHOLOGIST: CPT | Performed by: FAMILY MEDICINE

## 2017-09-25 PROCEDURE — 88321 CONSLTJ&REPRT SLD PREP ELSWR: CPT | Performed by: FAMILY MEDICINE

## 2017-09-25 PROCEDURE — 19081 BX BREAST 1ST LESION STRTCTC: CPT | Performed by: FAMILY MEDICINE

## 2017-09-29 ENCOUNTER — TELEPHONE (OUTPATIENT)
Dept: MAMMOGRAPHY | Facility: HOSPITAL | Age: 82
End: 2017-09-29

## 2017-09-29 NOTE — TELEPHONE ENCOUNTER
Telephoned Patricio Tejeda and name,  verified with pt. Notified Patricio Tejeda that her bx specimens have been sent to [facility] for further evaluation. Reassured her that this isn't a bad thing and apologized for the longer wait for results.   Patricio Tejeda re

## 2017-10-03 ENCOUNTER — PRIOR ORIGINAL RECORDS (OUTPATIENT)
Dept: OTHER | Age: 82
End: 2017-10-03

## 2017-10-03 ENCOUNTER — TELEPHONE (OUTPATIENT)
Dept: MAMMOGRAPHY | Facility: HOSPITAL | Age: 82
End: 2017-10-03

## 2017-10-03 PROBLEM — N60.92 ATYPICAL DUCTAL HYPERPLASIA OF LEFT BREAST: Status: ACTIVE | Noted: 2017-10-03

## 2017-10-03 NOTE — TELEPHONE ENCOUNTER
Telephoned Annamarie Cerda and name,  verified with pt. Notified Annamarie Cerda of left breast stereotactic biopsy result of ADH. Pt's PCP, Dr. Janet Garg is referring pt to see Dr. Susi Goncalves for f/u. Contact info provided.   Annamarie Cerda reports biopsy site is

## 2017-10-04 ENCOUNTER — TELEPHONE (OUTPATIENT)
Dept: MAMMOGRAPHY | Facility: HOSPITAL | Age: 82
End: 2017-10-04

## 2017-10-04 NOTE — TELEPHONE ENCOUNTER
Pt calling about breast biopsy results from yesterday. She wanted to know why the doctor didn't show her the mammogram. I told her we would be happy to show her and also that Dr. Cassius Tellez can show her on the computer when she comes for her [facility] apt.  Pt thankf

## 2017-10-10 ENCOUNTER — OFFICE VISIT (OUTPATIENT)
Dept: SURGERY | Facility: CLINIC | Age: 82
End: 2017-10-10

## 2017-10-10 VITALS
HEIGHT: 62 IN | HEART RATE: 62 BPM | TEMPERATURE: 98 F | WEIGHT: 146.38 LBS | BODY MASS INDEX: 26.94 KG/M2 | RESPIRATION RATE: 18 BRPM | SYSTOLIC BLOOD PRESSURE: 159 MMHG | DIASTOLIC BLOOD PRESSURE: 81 MMHG | OXYGEN SATURATION: 98 %

## 2017-10-10 DIAGNOSIS — N60.92 ATYPICAL DUCTAL HYPERPLASIA OF LEFT BREAST: Primary | ICD-10-CM

## 2017-10-10 PROCEDURE — 99205 OFFICE O/P NEW HI 60 MIN: CPT | Performed by: SURGERY

## 2017-10-10 RX ORDER — ASCORBIC ACID 500 MG
500 TABLET ORAL DAILY
COMMUNITY
End: 2018-04-18

## 2017-10-10 RX ORDER — BIOTIN 1 MG
1 TABLET ORAL DAILY
COMMUNITY
End: 2017-10-25

## 2017-10-10 NOTE — PROGRESS NOTES
Education Record    Learner:  Patient and Spouse    Disease / Diagnosis:surgical instructions    Barriers / Limitations:  None   Comments:    Method:  Discussion and Printed material   Comments:    General Topics:  Procedure and Plan of care reviewed   Com

## 2017-10-19 ENCOUNTER — TELEPHONE (OUTPATIENT)
Dept: SURGERY | Facility: CLINIC | Age: 82
End: 2017-10-19

## 2017-10-19 NOTE — TELEPHONE ENCOUNTER
AXEL to call surgery scheduler back to discuss dates for a procedure with Dr Tre Fletcher.  Provided direct call back number [phone]

## 2017-10-20 ENCOUNTER — OFFICE VISIT (OUTPATIENT)
Dept: FAMILY MEDICINE CLINIC | Facility: CLINIC | Age: 82
End: 2017-10-20

## 2017-10-20 VITALS
TEMPERATURE: 98 F | SYSTOLIC BLOOD PRESSURE: 136 MMHG | BODY MASS INDEX: 26.87 KG/M2 | HEART RATE: 64 BPM | WEIGHT: 146 LBS | DIASTOLIC BLOOD PRESSURE: 84 MMHG | HEIGHT: 62 IN

## 2017-10-20 DIAGNOSIS — I10 ESSENTIAL HYPERTENSION: ICD-10-CM

## 2017-10-20 DIAGNOSIS — Z23 NEED FOR VACCINATION: ICD-10-CM

## 2017-10-20 DIAGNOSIS — I48.0 PAROXYSMAL ATRIAL FIBRILLATION (HCC): ICD-10-CM

## 2017-10-20 DIAGNOSIS — I69.30 CHRONIC ISCHEMIC MULTIFOCAL MULTIPLE VASCULAR TERRITORIES STROKE: ICD-10-CM

## 2017-10-20 DIAGNOSIS — N60.92 ATYPICAL DUCTAL HYPERPLASIA OF LEFT BREAST: ICD-10-CM

## 2017-10-20 DIAGNOSIS — Z79.01 CHRONIC ANTICOAGULATION: ICD-10-CM

## 2017-10-20 DIAGNOSIS — Z01.818 PRE-OP EVALUATION: Primary | ICD-10-CM

## 2017-10-20 PROCEDURE — G0008 ADMIN INFLUENZA VIRUS VAC: HCPCS | Performed by: FAMILY MEDICINE

## 2017-10-20 PROCEDURE — 90653 IIV ADJUVANT VACCINE IM: CPT | Performed by: FAMILY MEDICINE

## 2017-10-20 PROCEDURE — 99214 OFFICE O/P EST MOD 30 MIN: CPT | Performed by: FAMILY MEDICINE

## 2017-10-20 RX ORDER — LABETALOL 300 MG/1
TABLET, FILM COATED ORAL
Qty: 180 TABLET | Refills: 1 | Status: SHIPPED | OUTPATIENT
Start: 2017-10-20 | End: 2018-08-15

## 2017-10-20 RX ORDER — LOSARTAN POTASSIUM 100 MG/1
TABLET ORAL
Qty: 90 TABLET | Refills: 1 | Status: SHIPPED | OUTPATIENT
Start: 2017-10-20 | End: 2018-10-09

## 2017-10-20 RX ORDER — SPIRONOLACTONE 25 MG/1
12.5 TABLET ORAL DAILY
Qty: 45 TABLET | Refills: 1 | Status: SHIPPED | OUTPATIENT
Start: 2017-10-20 | End: 2018-08-15

## 2017-10-20 RX ORDER — FUROSEMIDE 20 MG/1
20 TABLET ORAL DAILY PRN
Qty: 45 TABLET | Refills: 1 | Status: SHIPPED | OUTPATIENT
Start: 2017-10-20 | End: 2019-04-04

## 2017-10-20 RX ORDER — ATORVASTATIN CALCIUM 40 MG/1
40 TABLET, FILM COATED ORAL NIGHTLY
Qty: 90 TABLET | Refills: 1 | Status: SHIPPED | OUTPATIENT
Start: 2017-10-20 | End: 2018-06-27

## 2017-10-20 NOTE — PATIENT INSTRUCTIONS
Continue vitamin B12, your level is in normal range but not overly high. Don't take vitamin D or C for a week before the biopsy. Check with Dr. Emerald Baxter for specific instructions on stopping Eliquis pre-op.  My recommendation would be to stop Eliquis the

## 2017-10-20 NOTE — PROGRESS NOTES
Kamran Rodríguez IS A [de-identified]year old female [address] [facility] Patient presents with:  Hypertension  Pre-Op Exam: Breast biopsy scheduled 11/09/2017. History of present illness:     BP has been fairly steady. Had checked at home for awhile but not recently.      No n TABLET BY MOUTH ONCE DAILY Disp: 90 tablet Rfl: 1   spironolactone 25 MG Oral Tab Take 0.5 tablets (12.5 mg total) by mouth daily. Disp: 45 tablet Rfl: 1   furosemide 20 MG Oral Tab Take 1 tablet (20 mg total) by mouth daily as needed.  Disp: 45 tablet Rfl: Sleep Concern No    Stress Concern No    Weight Concern No    Special Diet No    Comment: little meat, a lot of veggies, some chicken    Exercise Yes    Comment: 2 days per week, water aerobics    Seat Belt Yes     Social History Narrative    Lives with hu Value:This result contains rich text formatting which cannot be displayed here. • Clinical Information 10/03/2017                      Value: This result contains rich text formatting which cannot be displayed here.    • Gross Description 10/03/2017 this visit:    Pre-op evaluation--patient at low risk for the proposed surgery. BP is controlled, heart rhythm regular.  Recommend brief discontinuation of anticoagulation as she had transient atrial fibrillation in hospital that may or may not have been th is.     Skip spironolactone and furosemide on the morning of surgery, recommend taking labetalol & losartan unless they direct you differently from anesthesia.  meds refilled for 6 months    See us in about February for well exam.

## 2017-10-24 ENCOUNTER — TELEPHONE (OUTPATIENT)
Dept: NEUROLOGY | Facility: CLINIC | Age: 82
End: 2017-10-24

## 2017-10-24 ENCOUNTER — TELEPHONE (OUTPATIENT)
Dept: SURGERY | Facility: CLINIC | Age: 82
End: 2017-10-24

## 2017-10-24 NOTE — TELEPHONE ENCOUNTER
Ms. Osman Kemp is having an upcoming surgery and will need to be off of Eliquis 1 day before and 1-2 days after surgery. This is ok per neurology. I called Ms. Sykes at [phone] and left a message on her personal voice mail regarding this.

## 2017-10-24 NOTE — TELEPHONE ENCOUNTER
Dr. Darell Zaidi office calling to obtain approval from Dr. Lisa Bob for patient to be off eliquis for upcoming incisional biopsy of left breast scheduled for 11/9/17.   Requesting if patient can be off medication day before and day of surgery, and start back o

## 2017-10-24 NOTE — TELEPHONE ENCOUNTER
Reviewed with patient that Dr Gerson Graham will determine how long to hold the Eloquis. Reviewed Dr Geronimo Toro instructions regarding her medication. Other questions addressed regarding surgical instructions.

## 2017-10-26 ENCOUNTER — TELEPHONE (OUTPATIENT)
Dept: SURGERY | Facility: CLINIC | Age: 82
End: 2017-10-26

## 2017-10-26 NOTE — TELEPHONE ENCOUNTER
Reviewed with patient that Dr Aden File her eliquis for 2 days before and 2 days after her scheduled procedure. Reviewed dates with patient, last dose Nov 6, resume on the 12th. Procedure date is Nov 9th. Pt verbalized understanding.

## 2017-11-01 ENCOUNTER — APPOINTMENT (OUTPATIENT)
Dept: LAB | Facility: HOSPITAL | Age: 82
End: 2017-11-01
Payer: MEDICARE

## 2017-11-01 DIAGNOSIS — N60.92 ATYPICAL DUCTAL HYPERPLASIA OF LEFT BREAST: ICD-10-CM

## 2017-11-01 PROCEDURE — 93005 ELECTROCARDIOGRAM TRACING: CPT

## 2017-11-01 PROCEDURE — 93010 ELECTROCARDIOGRAM REPORT: CPT | Performed by: INTERNAL MEDICINE

## 2017-11-03 ENCOUNTER — MED REC SCAN ONLY (OUTPATIENT)
Dept: FAMILY MEDICINE CLINIC | Facility: CLINIC | Age: 82
End: 2017-11-03

## 2017-11-06 ENCOUNTER — TELEPHONE (OUTPATIENT)
Dept: MAMMOGRAPHY | Facility: HOSPITAL | Age: 82
End: 2017-11-06

## 2017-11-06 NOTE — TELEPHONE ENCOUNTER
Spoke with patient regarding needle Localization process of breast for lumpectomy due Thursday. Procedure explained and all questions answered. Pt to be escorted via W/C through [address] [facility] to [address] in [facility]. Pt verbalized understanding.

## 2017-11-07 ENCOUNTER — PRIOR ORIGINAL RECORDS (OUTPATIENT)
Dept: OTHER | Age: 82
End: 2017-11-07

## 2017-11-09 ENCOUNTER — HOSPITAL ENCOUNTER (OUTPATIENT)
Facility: HOSPITAL | Age: 82
Setting detail: HOSPITAL OUTPATIENT SURGERY
Discharge: HOME | End: 2017-11-09
Attending: SURGERY | Admitting: SURGERY
Payer: MEDICARE

## 2017-11-09 ENCOUNTER — HOSPITAL ENCOUNTER (OUTPATIENT)
Dept: MAMMOGRAPHY | Facility: HOSPITAL | Age: 82
Discharge: HOME | End: 2017-11-09
Attending: SURGERY
Payer: MEDICARE

## 2017-11-09 ENCOUNTER — ANESTHESIA EVENT (OUTPATIENT)
Dept: SURGERY | Facility: HOSPITAL | Age: 82
End: 2017-11-09
Payer: MEDICARE

## 2017-11-09 ENCOUNTER — ANESTHESIA (OUTPATIENT)
Dept: SURGERY | Facility: HOSPITAL | Age: 82
End: 2017-11-09
Payer: MEDICARE

## 2017-11-09 ENCOUNTER — SURGERY (OUTPATIENT)
Age: 82
End: 2017-11-09

## 2017-11-09 VITALS
TEMPERATURE: 99 F | BODY MASS INDEX: 27.59 KG/M2 | RESPIRATION RATE: 16 BRPM | DIASTOLIC BLOOD PRESSURE: 74 MMHG | OXYGEN SATURATION: 100 % | SYSTOLIC BLOOD PRESSURE: 169 MMHG | HEART RATE: 62 BPM | HEIGHT: 62 IN | WEIGHT: 149.94 LBS

## 2017-11-09 DIAGNOSIS — N60.92 ATYPICAL DUCTAL HYPERPLASIA OF LEFT BREAST: Primary | ICD-10-CM

## 2017-11-09 DIAGNOSIS — N60.92 ATYPICAL DUCTAL HYPERPLASIA OF LEFT BREAST: ICD-10-CM

## 2017-11-09 PROCEDURE — 0HBU0ZZ EXCISION OF LEFT BREAST, OPEN APPROACH: ICD-10-PCS | Performed by: SURGERY

## 2017-11-09 PROCEDURE — 88305 TISSUE EXAM BY PATHOLOGIST: CPT | Performed by: SURGERY

## 2017-11-09 PROCEDURE — 19281 PERQ DEVICE BREAST 1ST IMAG: CPT | Performed by: SURGERY

## 2017-11-09 PROCEDURE — 76098 X-RAY EXAM SURGICAL SPECIMEN: CPT | Performed by: SURGERY

## 2017-11-09 RX ORDER — SODIUM CHLORIDE, SODIUM LACTATE, POTASSIUM CHLORIDE, CALCIUM CHLORIDE 600; 310; 30; 20 MG/100ML; MG/100ML; MG/100ML; MG/100ML
INJECTION, SOLUTION INTRAVENOUS CONTINUOUS
Status: DISCONTINUED | OUTPATIENT
Start: 2017-11-09 | End: 2017-11-09

## 2017-11-09 RX ORDER — ONDANSETRON 2 MG/ML
4 INJECTION INTRAMUSCULAR; INTRAVENOUS AS NEEDED
Status: DISCONTINUED | OUTPATIENT
Start: 2017-11-09 | End: 2017-11-09

## 2017-11-09 RX ORDER — HYDROCODONE BITARTRATE AND ACETAMINOPHEN 5; 325 MG/1; MG/1
2 TABLET ORAL AS NEEDED
Status: DISCONTINUED | OUTPATIENT
Start: 2017-11-09 | End: 2017-11-09

## 2017-11-09 RX ORDER — DIAZEPAM 5 MG/1
5 TABLET ORAL AS NEEDED
Status: DISCONTINUED | OUTPATIENT
Start: 2017-11-09 | End: 2017-11-09 | Stop reason: HOSPADM

## 2017-11-09 RX ORDER — HYDROMORPHONE HYDROCHLORIDE 1 MG/ML
0.4 INJECTION, SOLUTION INTRAMUSCULAR; INTRAVENOUS; SUBCUTANEOUS EVERY 5 MIN PRN
Status: DISCONTINUED | OUTPATIENT
Start: 2017-11-09 | End: 2017-11-09

## 2017-11-09 RX ORDER — HYDROCODONE BITARTRATE AND ACETAMINOPHEN 5; 325 MG/1; MG/1
1 TABLET ORAL AS NEEDED
Status: DISCONTINUED | OUTPATIENT
Start: 2017-11-09 | End: 2017-11-09

## 2017-11-09 RX ORDER — BUPIVACAINE HYDROCHLORIDE 5 MG/ML
INJECTION, SOLUTION EPIDURAL; INTRACAUDAL AS NEEDED
Status: DISCONTINUED | OUTPATIENT
Start: 2017-11-09 | End: 2017-11-09 | Stop reason: HOSPADM

## 2017-11-09 RX ORDER — LIDOCAINE HYDROCHLORIDE AND EPINEPHRINE 10; 10 MG/ML; UG/ML
INJECTION, SOLUTION INFILTRATION; PERINEURAL AS NEEDED
Status: DISCONTINUED | OUTPATIENT
Start: 2017-11-09 | End: 2017-11-09 | Stop reason: HOSPADM

## 2017-11-09 RX ORDER — NALOXONE HYDROCHLORIDE 0.4 MG/ML
80 INJECTION, SOLUTION INTRAMUSCULAR; INTRAVENOUS; SUBCUTANEOUS AS NEEDED
Status: DISCONTINUED | OUTPATIENT
Start: 2017-11-09 | End: 2017-11-09

## 2017-11-09 RX ORDER — CEFAZOLIN SODIUM 1 G/3ML
INJECTION, POWDER, FOR SOLUTION INTRAMUSCULAR; INTRAVENOUS
Status: DISCONTINUED | OUTPATIENT
Start: 2017-11-09 | End: 2017-11-09 | Stop reason: HOSPADM

## 2017-11-09 NOTE — ANESTHESIA POSTPROCEDURE EVALUATION
[facility] [facility] Patient Status:  Hospital Outpatient Surgery   Age/Gender [de-identified]year old female MRN [id number]   [facility] SURGERY Attending Keysha Luong MD   Hosp Day # 0 PCP Marivel Faulkner MD       Anesthesia Post-op

## 2017-11-09 NOTE — ANESTHESIA PREPROCEDURE EVALUATION
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   4. No previous study for comparison.         d Adriana Cotto MD 05/13/2017 12:23  t   [id number] 5/15/2017 7:26 AM #[id number]                (+) hypertension                  (+) dysrhythmias and atrial fibrillation Bilateral  07/00: TOTAL HIP REPLACEMENT Right      Comment: at [facility], [address]  5/1/3141: UMBILICAL HERNIA REPAIR N/A      Comment: Procedure:  HERNIA UMBILICAL REPAIR ADULT;                 Surgeon: Crystal Simeon MD;  Location: [facility]                MAIN O

## 2017-11-09 NOTE — BRIEF OP NOTE
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

## 2017-11-09 NOTE — H&P
Breast Surgery New Patient Consultation    History of Present Illness:   Ms. William Bourne is a [de-identified]year old woman who presents with L ADH in the breast.        Past Medical History:   Diagnosis Date   • Arrhythmia     DX AIFIB 1/2017   • Atypical chest alicia times daily. Disp: 180 tablet Rfl: 1   atorvastatin 40 MG Oral Tab Take 1 tablet (40 mg total) by mouth nightly. Disp: 90 tablet Rfl: 1   Vitamin C 500 MG Oral Tab Take 500 mg by mouth daily.  Disp:  Rfl:    Vitamin B-12 1000 MCG Oral Tab Take 1,000 mcg by near-fainting, difficulty breathing when lying flat, SOB/Coughing at night, swelling of the legs or chest pain while walking.     Breasts:  See history of present illness    Gastrointestinal:     There is no history of difficulty or pain with swallowing, re well-developed woman who appears her stated age. Her speech patterns and movements are normal. Her affect is appropriate. HEENT: The head is normocephalic. The neck is supple. The thyroid is not enlarged and is without palpable masses/nodules.  There are area in order to obtain a larger tissue sample and exclude co-existing malignancy. I explained to the patient that there was a 20-25% chance of associated DCIS and a 1-2% chance of an associated invasive cancer.   This procedure was explained in detail and

## 2017-11-09 NOTE — IMAGING NOTE
Assisted Dr. Michael Lopez with needle localization for breast for lumpectomy. Procedure explained and all questions answered. Pt verbalized understanding. Emotional support provided and pt tolerated procedure well with minimal discomfort.  Gauze dressing placed

## 2017-11-09 NOTE — OPERATIVE REPORT
[address]    PATIENT'S NAME: Maribel Don   ATTENDING PHYSICIAN: Adrienne Alvarado. Sofia Shone, M.D. OPERATING PHYSICIAN: Adrienne Alvarado. Sofia Shone, M.D.    PATIENT ACCOUNT#:   [de-identified]    LOCATION:  [address] [place] [address] 10  MEDICAL RECORD #:   [id number] position. She was properly padded and secured. She was given a dose of IV antibiotics, and sequential compression devices were applied to her legs for DVT prophylaxis.   Monitored anesthesia care was induced, and her left breast was prepped and draped in

## 2017-11-14 ENCOUNTER — TELEPHONE (OUTPATIENT)
Dept: SURGERY | Facility: CLINIC | Age: 82
End: 2017-11-14

## 2017-11-14 NOTE — TELEPHONE ENCOUNTER
Pt reports she is feeling well, with a little discomfort. Reviewed that pathology was benign. Pt has post op on Friday, she will call sooner if needed. Pt verbalized understanding.

## 2017-11-17 ENCOUNTER — OFFICE VISIT (OUTPATIENT)
Dept: SURGERY | Facility: CLINIC | Age: 82
End: 2017-11-17

## 2017-11-17 VITALS
WEIGHT: 149 LBS | HEIGHT: 62 IN | RESPIRATION RATE: 20 BRPM | HEART RATE: 65 BPM | SYSTOLIC BLOOD PRESSURE: 153 MMHG | TEMPERATURE: 98 F | OXYGEN SATURATION: 99 % | BODY MASS INDEX: 27.42 KG/M2 | DIASTOLIC BLOOD PRESSURE: 87 MMHG

## 2017-11-17 DIAGNOSIS — N60.92 ATYPICAL DUCTAL HYPERPLASIA OF LEFT BREAST: Primary | ICD-10-CM

## 2017-11-17 PROCEDURE — 99024 POSTOP FOLLOW-UP VISIT: CPT | Performed by: SURGERY

## 2017-12-27 ENCOUNTER — OFFICE VISIT (OUTPATIENT)
Dept: NEUROLOGY | Facility: CLINIC | Age: 82
End: 2017-12-27

## 2017-12-27 VITALS
HEART RATE: 84 BPM | WEIGHT: 158 LBS | DIASTOLIC BLOOD PRESSURE: 76 MMHG | SYSTOLIC BLOOD PRESSURE: 144 MMHG | RESPIRATION RATE: 16 BRPM | BODY MASS INDEX: 29 KG/M2

## 2017-12-27 DIAGNOSIS — I69.30 CHRONIC ISCHEMIC MULTIFOCAL MULTIPLE VASCULAR TERRITORIES STROKE: Primary | ICD-10-CM

## 2017-12-27 PROCEDURE — 99213 OFFICE O/P EST LOW 20 MIN: CPT | Performed by: OTHER

## 2017-12-27 NOTE — PROGRESS NOTES
Neurology H&P    Carmina Edwards Patient Status:  No patient class for patient encounter    1935 MRN [id number]   Location [address] [place] [address], [address], [address] [facility] Attending No att. providers found    [facility] [address] Day #  PCP MD Jaison Angeles Disp: 45 tablet Rfl: 1   apixaban 5 MG Oral Tab Take 1 tablet (5 mg total) by mouth 2 (two) times daily. Disp: 180 tablet Rfl: 1   atorvastatin 40 MG Oral Tab Take 1 tablet (40 mg total) by mouth nightly.  Disp: 90 tablet Rfl: 1   Vitamin C 500 MG Oral Tab of the lower leg    • Mild intermittent asthma without complication 5/22/8233   • Osteoarthritis of hip 11/09   • Pneumonia due to organism    • Stroke [facility]) 01/2017    no residual effects   • Tibial fracture    • Unspecified essential hypertension     off not currently breastfeeding.     Gen: Awake and in no apparent distress  HEENT: moist mucus membranes  Neck: Supple  Cardiovascular: Regular rate and rhythm, no murmur  Pulm: CTAB  GI: non-tender, normal bowel sounds  Skin: normal, dry  Extremities: No club analysis.      TTE 1/6/17    Conclusions:    1. Left ventricle:  The cavity size was normal. Wall thickness was normal.     Systolic function was normal. The estimated ejection fraction was 60%.     Left ventricular diastolic function parameters were normal Follow  A mediterranean diet  - Abstain from tobacco products  - BP goals <140/90 optimally normotensive 120/80.  Use ACEI if possible      Jania Knowles, DO  Neurology

## 2017-12-27 NOTE — PATIENT INSTRUCTIONS
Refill policies:    • Allow 2-3 business days for refills; controlled substances may take longer.   • Contact your pharmacy at least 5 days prior to running out of medication and have them send an electronic request or submit request through the [facility] have a procedure or additional testing performed. [facility]) will contact your insurance carrier to obtain pre-certification or prior authorization.     Unfortunately, [employer] has seen an increase in denial of payment even though the p

## 2018-01-08 NOTE — PROGRESS NOTES
Breast Surgery New Patient Consultation    This is the first visit for this [de-identified]year old woman, referred by Dr. Lucia Dolan, who presents for evaluation of atypical ductal hyperplasia.     History of Present Illness:   Ms. Pravin Verdugo is a [de-identified]year old woman who Comment: Christelle Harrison, [address]: [facility] NEEDLE LOCALIZATION W/ SPECIMEN 1 SITE RIG* Right      Comment: benign tumor  No date: OOPHORECTOMY Bilateral  07/00: TOTAL HIP REPLACEMENT Right      Comment: at [facility], [address]  4/3/8704: UMBILICAL HERNIA R Comments)    Comment:Severe hyponatremia requiring hospitalization             hyponatremia  Thiazide-Type Diure*    Other (See Comments)    Comment:Hyponatremia             hyponatremia    Family History:   Family History   Problem Relation Age of Onset vomiting blood.      Genitourinary:  The patient denies frequent urination, +needing to get up at night to urinate, urinary hesitancy or retaining urine, painful urination, urinary incontinence, decreased urine stream, blood in the urine or vaginal/penile d expands symmetrically. The lungs are clear to auscultation. Heart: The rhythm is regular. There are no murmurs, rubs, gallops or thrills. Breasts:  Her breasts are symmetrical with a cup size B.   Right breast: The skin, nipple ,and areola appear nor the patient that there was a 20-25% chance of associated DCIS and a 1-2% chance of an associated invasive cancer. This procedure was explained in detail and she agrees to proceed with surgical excision of the area to exclude these problems.   We discussed

## 2018-01-15 NOTE — PROGRESS NOTES
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     Stage: Not applicable    Disease Status:  Surgical treatment complete, chemoprevention counseling and hig hip 11/09   • Pneumonia due to organism    • Stroke [facility]) 01/2017    no residual effects   • Tibial fracture    • Unspecified essential hypertension     office BP runs higher than home.        Past Surgical History:  11/2009: BREAST LUMPECTOMY Left      Com Tape           Itching    Comment:redness  Amlodipine              Swelling    Comment:Swelling of ankles  Clonidine               Dizziness  Furosemide              Other (See Comments)    Comment:Severe hyponatremia requiring hospitalization swallowing, reflux symptoms, vomiting, dark or bloody stools, constipation, yellowing of the skin, indigestion, nausea, change in bowel habits, diarrhea, abdominal pain or vomiting blood.      Genitourinary:  The patient denies frequent urination, +needing Patient regarding her breast exam. On exam today I found her to be healing well since the surgery with no signs of infection. No further surgery is required. I personally reviewed her pathology which showed no additional concerning findings.  I explained

## 2018-02-12 PROBLEM — G47.8 DIFFICULTY WAKING: Status: ACTIVE | Noted: 2018-02-12

## 2018-02-19 ENCOUNTER — OFFICE VISIT (OUTPATIENT)
Dept: FAMILY MEDICINE CLINIC | Facility: CLINIC | Age: 83
End: 2018-02-19

## 2018-02-19 VITALS
OXYGEN SATURATION: 99 % | RESPIRATION RATE: 16 BRPM | SYSTOLIC BLOOD PRESSURE: 140 MMHG | WEIGHT: 147.38 LBS | TEMPERATURE: 98 F | BODY MASS INDEX: 27.47 KG/M2 | HEIGHT: 61.5 IN | DIASTOLIC BLOOD PRESSURE: 62 MMHG | HEART RATE: 62 BPM

## 2018-02-19 DIAGNOSIS — D51.9 ANEMIA DUE TO VITAMIN B12 DEFICIENCY, UNSPECIFIED B12 DEFICIENCY TYPE: ICD-10-CM

## 2018-02-19 DIAGNOSIS — L93.0 DISCOID LUPUS: ICD-10-CM

## 2018-02-19 DIAGNOSIS — E87.1 HYPONATREMIA: ICD-10-CM

## 2018-02-19 DIAGNOSIS — I70.0 THORACIC AORTA ATHEROSCLEROSIS (HCC): ICD-10-CM

## 2018-02-19 DIAGNOSIS — M81.0 AGE-RELATED OSTEOPOROSIS WITHOUT CURRENT PATHOLOGICAL FRACTURE: ICD-10-CM

## 2018-02-19 DIAGNOSIS — R26.2 DIFFICULTY WALKING: ICD-10-CM

## 2018-02-19 DIAGNOSIS — N60.92 ATYPICAL DUCTAL HYPERPLASIA OF LEFT BREAST: ICD-10-CM

## 2018-02-19 DIAGNOSIS — L84 CALLUS OF FOOT: ICD-10-CM

## 2018-02-19 DIAGNOSIS — Z00.00 ENCOUNTER FOR MEDICARE ANNUAL WELLNESS EXAM: Primary | ICD-10-CM

## 2018-02-19 DIAGNOSIS — E78.5 HYPERLIPIDEMIA, UNSPECIFIED HYPERLIPIDEMIA TYPE: ICD-10-CM

## 2018-02-19 DIAGNOSIS — I10 ESSENTIAL HYPERTENSION: ICD-10-CM

## 2018-02-19 DIAGNOSIS — Z00.00 ENCOUNTER FOR ANNUAL HEALTH EXAMINATION: ICD-10-CM

## 2018-02-19 DIAGNOSIS — J45.20 MILD INTERMITTENT ASTHMA WITHOUT COMPLICATION: ICD-10-CM

## 2018-02-19 DIAGNOSIS — I69.359 HEMIPARESIS DUE TO RECENT CEREBROVASCULAR ACCIDENT (CVA) (HCC): ICD-10-CM

## 2018-02-19 DIAGNOSIS — I27.20 PULMONARY HYPERTENSION, MODERATE TO SEVERE (HCC): ICD-10-CM

## 2018-02-19 DIAGNOSIS — I48.0 PAROXYSMAL ATRIAL FIBRILLATION (HCC): ICD-10-CM

## 2018-02-19 DIAGNOSIS — Z85.42 HISTORY OF ENDOMETRIAL CANCER: ICD-10-CM

## 2018-02-19 DIAGNOSIS — Z90.710 HISTORY OF HYSTERECTOMY: ICD-10-CM

## 2018-02-19 PROBLEM — J44.89 ASTHMA WITH COPD (CHRONIC OBSTRUCTIVE PULMONARY DISEASE): Chronic | Status: ACTIVE | Noted: 2018-02-19

## 2018-02-19 PROBLEM — J44.89 ASTHMA WITH COPD (CHRONIC OBSTRUCTIVE PULMONARY DISEASE) (HCC): Chronic | Status: ACTIVE | Noted: 2018-02-19

## 2018-02-19 PROBLEM — I63.9 CARDIOEMBOLIC STROKE (HCC): Status: RESOLVED | Noted: 2017-04-06 | Resolved: 2018-02-19

## 2018-02-19 PROBLEM — J44.9 ASTHMA WITH COPD (CHRONIC OBSTRUCTIVE PULMONARY DISEASE): Chronic | Status: ACTIVE | Noted: 2018-02-19

## 2018-02-19 PROBLEM — J44.9 ASTHMA WITH COPD (CHRONIC OBSTRUCTIVE PULMONARY DISEASE) (HCC): Chronic | Status: ACTIVE | Noted: 2018-02-19

## 2018-02-19 PROBLEM — I69.354 HEMIPARESIS AFFECTING LEFT SIDE AS LATE EFFECT OF STROKE (HCC): Status: RESOLVED | Noted: 2017-04-19 | Resolved: 2018-02-19

## 2018-02-19 PROCEDURE — 96160 PT-FOCUSED HLTH RISK ASSMT: CPT | Performed by: FAMILY MEDICINE

## 2018-02-19 PROCEDURE — 99397 PER PM REEVAL EST PAT 65+ YR: CPT | Performed by: FAMILY MEDICINE

## 2018-02-19 PROCEDURE — G0439 PPPS, SUBSEQ VISIT: HCPCS | Performed by: FAMILY MEDICINE

## 2018-02-19 RX ORDER — ALENDRONATE SODIUM 70 MG/1
70 TABLET ORAL WEEKLY
Qty: 12 TABLET | Refills: 1 | Status: SHIPPED | OUTPATIENT
Start: 2018-02-19 | End: 2018-12-14

## 2018-02-19 NOTE — PATIENT INSTRUCTIONS
When new shingles vaccine is available, [employer] recommend getting it at pharmacy, an initial one and a booster 2-6 months later. For osteoporosis: recommend starting alendronate, 70 mg weekly, see instructions.  Take with a full glass of water by itself (mg/dL)   Date Value   04/04/2017 154     Triglycerides (mg/dL)   Date Value   04/04/2017 66        EKG - covered if needed at Welcome to Medicare, and non-screening if indicated for medical reasons Electrocardiogram date11/01/2017 Routine EKG is not a scr anniversary or as ordered in After Visit Summary    Do next year, start alendronate.    Pap and Pelvic      Pap: Every 3 yrs age 21-65 or Pap+HPV every 5 yrs age 33-67, Covered every 2 yrs up to age 79 or Yearly if High Risk   There are no preventive care r elizabeth) slows calcium loss from bones. It helps to make healthy bone and to slow bone loss in people with osteoporosis. It may be used to treat Paget's disease. How should I use this medicine?   You must take this medicine exactly as directed or you will low hydroxide  · antacids  · aspirin  · calcium supplements  · drugs for inflammation like ibuprofen, naproxen, and others  · iron supplements  · magnesium supplements  · vitamins with minerals  What if I miss a dose?   If you miss a dose, take the dose on the that does not go away or that gets worse. This medicine can make you more sensitive to the sun. If you get a rash while taking this medicine, sunlight may cause the rash to get worse. Keep out of the sun.  If you cannot avoid being in the sun, wear protect

## 2018-02-19 NOTE — PROGRESS NOTES
HPI:   Richard Quintanilla is a [de-identified]year old female who presents for a MA (Medicare Advantage) [address] [place] [address] (Once per calendar year). Annual Physical due on 04/19/2018      S/p CVA: balance not quite as good as previous. No falls.    Denies problems with physic Feeling down, depressed, or hopeless (over the last two weeks)?: Not at all  PHQ-2 SCORE: 0     Advanced Directive:   She does have a Living Will but we do NOT have it on file in [address] [place] [address] Rd.    The patient has this document but we do not have it in [address] [place] [address] Rd, and yasmin Pulmonary hypertension, moderate to severe (HCC)     Osteoporosis without current pathological fracture     Callus of foot     Atypical ductal hyperplasia of left breast     Difficulty walking    Wt Readings from Last 3 Encounters:  02/19/18 : 147 lb 6. 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  PERRL, conjunctiva/corneas clear, EOM Encounter for Medicare annual wellness exam    Pulmonary hypertension, moderate to severe (HCC)--on echocardiogram at time of pneumonia & CVA. Repeat echo to reassess.  -     CARD ECHO 2D DOPPLER (CPT=93306);  Future    Paroxysmal atrial fibrillation ([facility] [address]- The patient indicates understanding of these issues and agrees to the plan. Reinforced healthy diet, lifestyle, and exercise. does regular water exercise. Return in about 2 months (around 4/19/2018), or med refills.      Gabby Weber MD, 2/19/2018 Patient must be diagnosed with one of the following:   • Hypertension   • Dyslipidemia   • Obesity (BMI ³30 kg/m2)   • Previous elevated impaired FBS or GTT   … or any two of the following:   • Overweight (BMI ³25 but <30)   • Family history of diabetes Glaucoma Screening      Ophthalmology Visit   Covered annually for Diabetics, people with Glaucoma family history,   Americans over age 48   Americans over age 72 No flowsheet data found.  OK to schedule if you are in this risk group, make webster No orders found for this or any previous visit.  Medium/high risk factors:   End-stage renal disease   Hemophiliacs who received Factor VIII or IX concentrates   Clients of institutions for the mentally retarded   Persons who live in the same house as a He Side effects that you should report to your doctor or health care professional as soon as possible:  · allergic reactions like skin rash, itching or hives, swelling of the face, lips, or tongue  · black or tarry stools  · bone, muscle or joint pain  · bingham What should I watch for while using this medicine? Visit your doctor or health care professional for regular checks ups. It may be some time before you see benefit from this medicine. Do not stop taking your medication except on your doctor's advice.  Your

## 2018-02-21 ENCOUNTER — PRIOR ORIGINAL RECORDS (OUTPATIENT)
Dept: OTHER | Age: 83
End: 2018-02-21

## 2018-02-21 ENCOUNTER — LAB ENCOUNTER (OUTPATIENT)
Dept: LAB | Age: 83
End: 2018-02-21
Attending: FAMILY MEDICINE
Payer: MEDICARE

## 2018-02-21 DIAGNOSIS — D51.9 ANEMIA DUE TO VITAMIN B12 DEFICIENCY, UNSPECIFIED B12 DEFICIENCY TYPE: ICD-10-CM

## 2018-02-21 DIAGNOSIS — E87.1 HYPONATREMIA: ICD-10-CM

## 2018-02-21 DIAGNOSIS — E78.5 HYPERLIPIDEMIA, UNSPECIFIED HYPERLIPIDEMIA TYPE: ICD-10-CM

## 2018-02-21 LAB
ALBUMIN SERPL-MCNC: 3.7 G/DL (ref 3.5–4.8)
ALP LIVER SERPL-CCNC: 93 U/L (ref 55–142)
ALT SERPL-CCNC: 24 U/L (ref 14–54)
AST SERPL-CCNC: 28 U/L (ref 15–41)
BASOPHILS # BLD AUTO: 0.04 X10(3) UL (ref 0–0.1)
BASOPHILS NFR BLD AUTO: 0.7 %
BILIRUB SERPL-MCNC: 1.5 MG/DL (ref 0.1–2)
BUN BLD-MCNC: 12 MG/DL (ref 8–20)
CALCIUM BLD-MCNC: 9.3 MG/DL (ref 8.3–10.3)
CHLORIDE: 106 MMOL/L (ref 101–111)
CHOLEST SMN-MCNC: 177 MG/DL (ref ?–200)
CO2: 24 MMOL/L (ref 22–32)
CREAT BLD-MCNC: 0.86 MG/DL (ref 0.55–1.02)
EOSINOPHIL # BLD AUTO: 0.04 X10(3) UL (ref 0–0.3)
EOSINOPHIL NFR BLD AUTO: 0.7 %
ERYTHROCYTE [DISTWIDTH] IN BLOOD BY AUTOMATED COUNT: 13.4 % (ref 11.5–16)
GLUCOSE BLD-MCNC: 83 MG/DL (ref 70–99)
HAV AB SERPL IA-ACNC: 499 PG/ML (ref 193–986)
HCT VFR BLD AUTO: 36.1 % (ref 34–50)
HDLC SERPL-MCNC: 123 MG/DL (ref 45–?)
HDLC SERPL: 1.44 {RATIO} (ref ?–4.44)
HGB BLD-MCNC: 11.7 G/DL (ref 12–16)
IMMATURE GRANULOCYTE COUNT: 0.02 X10(3) UL (ref 0–1)
IMMATURE GRANULOCYTE RATIO %: 0.3 %
LDLC SERPL CALC-MCNC: 41 MG/DL (ref ?–130)
LYMPHOCYTES # BLD AUTO: 1.25 X10(3) UL (ref 0.9–4)
LYMPHOCYTES NFR BLD AUTO: 21.4 %
M PROTEIN MFR SERPL ELPH: 8 G/DL (ref 6.1–8.3)
MCH RBC QN AUTO: 33.1 PG (ref 27–33.2)
MCHC RBC AUTO-ENTMCNC: 32.4 G/DL (ref 31–37)
MCV RBC AUTO: 102 FL (ref 81–100)
MONOCYTES # BLD AUTO: 0.57 X10(3) UL (ref 0.1–1)
MONOCYTES NFR BLD AUTO: 9.8 %
NEUTROPHIL ABS PRELIM: 3.91 X10 (3) UL (ref 1.3–6.7)
NEUTROPHILS # BLD AUTO: 3.91 X10(3) UL (ref 1.3–6.7)
NEUTROPHILS NFR BLD AUTO: 67.1 %
NONHDLC SERPL-MCNC: 54 MG/DL (ref ?–130)
PLATELET # BLD AUTO: 279 10(3)UL (ref 150–450)
POTASSIUM SERPL-SCNC: 4 MMOL/L (ref 3.6–5.1)
RBC # BLD AUTO: 3.54 X10(6)UL (ref 3.8–5.1)
RED CELL DISTRIBUTION WIDTH-SD: 50.4 FL (ref 35.1–46.3)
SODIUM SERPL-SCNC: 139 MMOL/L (ref 136–144)
TRIGL SERPL-MCNC: 64 MG/DL (ref ?–150)
VLDLC SERPL CALC-MCNC: 13 MG/DL (ref 5–40)
WBC # BLD AUTO: 5.8 X10(3) UL (ref 4–13)

## 2018-02-21 PROCEDURE — 82607 VITAMIN B-12: CPT | Performed by: FAMILY MEDICINE

## 2018-02-21 PROCEDURE — 80053 COMPREHEN METABOLIC PANEL: CPT | Performed by: FAMILY MEDICINE

## 2018-02-21 PROCEDURE — 36415 COLL VENOUS BLD VENIPUNCTURE: CPT | Performed by: FAMILY MEDICINE

## 2018-02-21 PROCEDURE — 80061 LIPID PANEL: CPT | Performed by: FAMILY MEDICINE

## 2018-02-21 PROCEDURE — 85025 COMPLETE CBC W/AUTO DIFF WBC: CPT | Performed by: FAMILY MEDICINE

## 2018-03-26 ENCOUNTER — APPOINTMENT (OUTPATIENT)
Dept: LAB | Age: 83
End: 2018-03-26
Attending: FAMILY MEDICINE
Payer: MEDICARE

## 2018-03-26 PROCEDURE — 36415 COLL VENOUS BLD VENIPUNCTURE: CPT | Performed by: FAMILY MEDICINE

## 2018-03-26 PROCEDURE — 82746 ASSAY OF FOLIC ACID SERUM: CPT | Performed by: FAMILY MEDICINE

## 2018-04-18 ENCOUNTER — OFFICE VISIT (OUTPATIENT)
Dept: FAMILY MEDICINE CLINIC | Facility: CLINIC | Age: 83
End: 2018-04-18

## 2018-04-18 VITALS
SYSTOLIC BLOOD PRESSURE: 136 MMHG | WEIGHT: 149.19 LBS | DIASTOLIC BLOOD PRESSURE: 74 MMHG | OXYGEN SATURATION: 97 % | HEIGHT: 61.5 IN | HEART RATE: 62 BPM | BODY MASS INDEX: 27.81 KG/M2 | TEMPERATURE: 98 F | RESPIRATION RATE: 17 BRPM

## 2018-04-18 DIAGNOSIS — I48.0 PAROXYSMAL ATRIAL FIBRILLATION (HCC): ICD-10-CM

## 2018-04-18 DIAGNOSIS — L93.0 DISCOID LUPUS: ICD-10-CM

## 2018-04-18 DIAGNOSIS — M81.0 AGE-RELATED OSTEOPOROSIS WITHOUT CURRENT PATHOLOGICAL FRACTURE: Primary | ICD-10-CM

## 2018-04-18 DIAGNOSIS — Z96.641 STATUS POST TOTAL REPLACEMENT OF RIGHT HIP: ICD-10-CM

## 2018-04-18 DIAGNOSIS — I10 ESSENTIAL HYPERTENSION: ICD-10-CM

## 2018-04-18 DIAGNOSIS — Z86.73 H/O ISCHEMIC RIGHT PCA STROKE: ICD-10-CM

## 2018-04-18 DIAGNOSIS — I27.20 PULMONARY HYPERTENSION, MODERATE TO SEVERE (HCC): ICD-10-CM

## 2018-04-18 PROCEDURE — 99214 OFFICE O/P EST MOD 30 MIN: CPT | Performed by: FAMILY MEDICINE

## 2018-04-18 NOTE — PROGRESS NOTES
Filled rx for alendronate, didn't start on it. Los Lamar IS A 82year old female [address] [facility] Patient presents with: Follow - Up: two month F/U        History of present illness:     Asks about vitamin C, if she should continue.  It was originally recom losartan 100 MG Oral Tab TAKE ONE TABLET BY MOUTH ONCE DAILY Disp: 90 tablet Rfl: 1   spironolactone 25 MG Oral Tab Take 0.5 tablets (12.5 mg total) by mouth daily.  Disp: 45 tablet Rfl: 1   furosemide 20 MG Oral Tab Take 1 tablet (20 mg total) by mouth d blood)    Caffeine Concern Yes    Comment: 1 cup coffee weekly and 1 cup tea daily    Occupational Exposure No    Hobby Hazards No    Sleep Concern No    Stress Concern No    Weight Concern No    Special Diet No    Comment: little meat, a lot of veggies, s are usually the most effective antiperspirants. You may not need it. Recommend starting alendronate. Take with a full glass of water 1 day a week and stay sitting up, may take other medicines or food 30-60 minutes later.  Call if heartburn, stomach upse

## 2018-04-18 NOTE — PATIENT INSTRUCTIONS
You can stop vitamin C. (unless dermatologist advises differently). Continue B12 & D. Food sources of vitamin C are important. Roll-on antiperspirant with aluminum compounds are usually the most effective antiperspirants. You may not need it.      Recom

## 2018-05-10 ENCOUNTER — HOSPITAL ENCOUNTER (OUTPATIENT)
Dept: CV DIAGNOSTICS | Facility: HOSPITAL | Age: 83
Discharge: HOME | End: 2018-05-10
Attending: FAMILY MEDICINE
Payer: MEDICARE

## 2018-05-10 DIAGNOSIS — I27.20 PULMONARY HYPERTENSION, MODERATE TO SEVERE (HCC): ICD-10-CM

## 2018-05-10 DIAGNOSIS — I10 ESSENTIAL HYPERTENSION: ICD-10-CM

## 2018-05-10 PROCEDURE — 93306 TTE W/DOPPLER COMPLETE: CPT | Performed by: FAMILY MEDICINE

## 2018-05-21 ENCOUNTER — TELEPHONE (OUTPATIENT)
Dept: FAMILY MEDICINE CLINIC | Facility: CLINIC | Age: 83
End: 2018-05-21

## 2018-05-21 ENCOUNTER — PRIOR ORIGINAL RECORDS (OUTPATIENT)
Dept: OTHER | Age: 83
End: 2018-05-21

## 2018-05-21 NOTE — TELEPHONE ENCOUNTER
Pt called earlier today and said her Cardiologist wanted a copy of her latest imaging tests. I explained that if her Cardiologist is an [address] Doctor - he can see the results in her chart. Pt expressed understanding.     **Pt called back and LVM AT [facility]

## 2018-05-23 ENCOUNTER — PRIOR ORIGINAL RECORDS (OUTPATIENT)
Dept: OTHER | Age: 83
End: 2018-05-23

## 2018-05-23 ENCOUNTER — TELEPHONE (OUTPATIENT)
Dept: SURGERY | Facility: CLINIC | Age: 83
End: 2018-05-23

## 2018-06-20 ENCOUNTER — OFFICE VISIT (OUTPATIENT)
Dept: FAMILY MEDICINE CLINIC | Facility: CLINIC | Age: 83
End: 2018-06-20

## 2018-06-20 VITALS
DIASTOLIC BLOOD PRESSURE: 64 MMHG | BODY MASS INDEX: 27.77 KG/M2 | SYSTOLIC BLOOD PRESSURE: 138 MMHG | RESPIRATION RATE: 20 BRPM | HEART RATE: 64 BPM | OXYGEN SATURATION: 98 % | TEMPERATURE: 98 F | HEIGHT: 61.5 IN | WEIGHT: 149 LBS

## 2018-06-20 DIAGNOSIS — J06.9 VIRAL UPPER RESPIRATORY TRACT INFECTION: Primary | ICD-10-CM

## 2018-06-20 PROBLEM — J44.9 ASTHMA WITH COPD (CHRONIC OBSTRUCTIVE PULMONARY DISEASE) (HCC): Chronic | Status: ACTIVE | Noted: 2018-06-20

## 2018-06-20 PROBLEM — J44.89 ASTHMA WITH COPD (CHRONIC OBSTRUCTIVE PULMONARY DISEASE) (HCC): Chronic | Status: ACTIVE | Noted: 2018-06-20

## 2018-06-20 PROBLEM — J44.9 ASTHMA WITH COPD (CHRONIC OBSTRUCTIVE PULMONARY DISEASE): Chronic | Status: ACTIVE | Noted: 2018-06-20

## 2018-06-20 PROBLEM — J44.89 ASTHMA WITH COPD (CHRONIC OBSTRUCTIVE PULMONARY DISEASE): Chronic | Status: ACTIVE | Noted: 2018-06-20

## 2018-06-20 PROCEDURE — 99213 OFFICE O/P EST LOW 20 MIN: CPT | Performed by: FAMILY MEDICINE

## 2018-06-20 RX ORDER — CEFDINIR 300 MG/1
300 CAPSULE ORAL 2 TIMES DAILY
Qty: 20 CAPSULE | Refills: 0 | Status: SHIPPED | OUTPATIENT
Start: 2018-06-20 | End: 2018-06-27 | Stop reason: ALTCHOICE

## 2018-06-20 NOTE — PROGRESS NOTES
CHIEF COMPLAINT:   Patient presents with:  URI: chest congestion,sore throat,coughing and post nasal drip x 10 days on/off      HPI:   William Bourne is a 82year old female who presents for upper respiratory symptoms and cough for  4 days.   Notes sore thro visit.     Past Medical History:   Diagnosis Date   • Arrhythmia     DX AIFIB 1/2017   • Atypical chest pain 03/10   • Cataract    • Cutaneous lupus erythematosus 2/13    + ABRIL 1:320 titer 2/13   • [facility]   • Fibrocystic breast change developed, well nourished,in no apparent distress  SKIN: no rashes,no suspicious lesions  HEAD: atraumatic, normocephalic. Sinuses: Non-tender .   EYES: conjunctiva clear, EOM intact  EARS: TM's pearly, no bulging, noretraction,no fluid, bony landmarks pre shortness of breath, pain with breathing or fever develop, contact your PCP or go to Immediate care for further evaluation which may include chest x-ray. Encouraged pt to increase sodium and fluid intake in the short term.  If any dizziness, persiste

## 2018-06-21 LAB
ALBUMIN: 3.7 G/DL
ALKALINE PHOSPHATATE(ALK PHOS): 93 IU/L
BILIRUBIN TOTAL: 1.5 MG/DL
BUN: 12 MG/DL
CALCIUM: 9.3 MG/DL
CHLORIDE: 106 MEQ/L
CHOLESTEROL, TOTAL: 177 MG/DL
CREATININE, SERUM: 0.86 MG/DL
GLUCOSE: 83 MG/DL
HDL CHOLESTEROL: 123 MG/DL
HEMATOCRIT: 36.1 %
HEMOGLOBIN: 11.7 G/DL
LDL CHOLESTEROL: 41 MG/DL
PLATELETS: 279 K/UL
POTASSIUM, SERUM: 4 MEQ/L
PROTEIN, TOTAL: 8 G/DL
RED BLOOD COUNT: 3.54 X 10-6/U
SGOT (AST): 28 IU/L
SGPT (ALT): 24 IU/L
SODIUM: 139 MEQ/L
TRIGLYCERIDES: 64 MG/DL
WHITE BLOOD COUNT: 5.8 X 10-3/U

## 2018-06-26 ENCOUNTER — OFFICE VISIT (OUTPATIENT)
Dept: FAMILY MEDICINE CLINIC | Facility: CLINIC | Age: 83
End: 2018-06-26

## 2018-06-26 VITALS
SYSTOLIC BLOOD PRESSURE: 130 MMHG | TEMPERATURE: 98 F | BODY MASS INDEX: 26.62 KG/M2 | HEIGHT: 61.5 IN | RESPIRATION RATE: 18 BRPM | OXYGEN SATURATION: 98 % | HEART RATE: 60 BPM | DIASTOLIC BLOOD PRESSURE: 80 MMHG | WEIGHT: 142.81 LBS

## 2018-06-26 DIAGNOSIS — J06.9 VIRAL URI: Primary | ICD-10-CM

## 2018-06-26 DIAGNOSIS — R53.83 FATIGUE, UNSPECIFIED TYPE: ICD-10-CM

## 2018-06-26 PROCEDURE — 99213 OFFICE O/P EST LOW 20 MIN: CPT | Performed by: FAMILY MEDICINE

## 2018-06-26 NOTE — PROGRESS NOTES
Los Lamar IS A 82year old female HERE FOR Patient presents with:  URI: Went to [facility], F/U. Sympt - lethargic. History of present illness:     [place] on [place] [employer] cruise, [employer] sites.  (Grand-nephews graduated [employer] & college.) returned 6/16 Sunday tablet Rfl: 1   Labetalol HCl 300 MG Oral Tab Take 300 mg by mouth 2 (two) times daily.  Disp: 180 tablet Rfl: 1   losartan 100 MG Oral Tab TAKE ONE TABLET BY MOUTH ONCE DAILY Disp: 90 tablet Rfl: 1   spironolactone 25 MG Oral Tab Take 0.5 tablets (12.5 mg yr ago with hip replacement (own blood)    Caffeine Concern Yes    Comment: 1 cup coffee weekly and 1 cup tea daily    Occupational Exposure No    Hobby Hazards No    Sleep Concern No    Stress Concern No    Weight Concern No    Special Diet No    Comment:

## 2018-06-27 ENCOUNTER — LAB ENCOUNTER (OUTPATIENT)
Dept: LAB | Age: 83
End: 2018-06-27
Attending: FAMILY MEDICINE
Payer: MEDICARE

## 2018-06-27 ENCOUNTER — OFFICE VISIT (OUTPATIENT)
Dept: NEUROLOGY | Facility: CLINIC | Age: 83
End: 2018-06-27

## 2018-06-27 VITALS
WEIGHT: 142 LBS | HEART RATE: 64 BPM | RESPIRATION RATE: 16 BRPM | BODY MASS INDEX: 26 KG/M2 | DIASTOLIC BLOOD PRESSURE: 62 MMHG | SYSTOLIC BLOOD PRESSURE: 114 MMHG

## 2018-06-27 DIAGNOSIS — R53.83 FATIGUE, UNSPECIFIED TYPE: ICD-10-CM

## 2018-06-27 DIAGNOSIS — J06.9 VIRAL URI: ICD-10-CM

## 2018-06-27 DIAGNOSIS — I69.30 CHRONIC ISCHEMIC MULTIFOCAL MULTIPLE VASCULAR TERRITORIES STROKE: Primary | ICD-10-CM

## 2018-06-27 PROCEDURE — 36415 COLL VENOUS BLD VENIPUNCTURE: CPT

## 2018-06-27 PROCEDURE — 85025 COMPLETE CBC W/AUTO DIFF WBC: CPT

## 2018-06-27 PROCEDURE — 99213 OFFICE O/P EST LOW 20 MIN: CPT | Performed by: OTHER

## 2018-06-27 PROCEDURE — 80048 BASIC METABOLIC PNL TOTAL CA: CPT

## 2018-06-27 RX ORDER — ATORVASTATIN CALCIUM 40 MG/1
40 TABLET, FILM COATED ORAL NIGHTLY
Qty: 90 TABLET | Refills: 1 | Status: SHIPPED | OUTPATIENT
Start: 2018-06-27 | End: 2019-01-19

## 2018-06-27 NOTE — PROGRESS NOTES
Pt here for stroke follow up. Stroke 1.5 years ago. Pt reports no difficulties or stroke-like symptoms since last visit.

## 2018-06-27 NOTE — PATIENT INSTRUCTIONS
Refill policies:    • Allow 2-3 business days for refills; controlled substances may take longer.   • Contact your pharmacy at least 5 days prior to running out of medication and have them send an electronic request or submit request through the “request re entire amount billed. Precertification and Prior Authorizations: If your physician has recommended that you have a procedure or additional testing performed.   [facility]) will contact your insurance carrier to obtain pre-certi

## 2018-06-27 NOTE — PROGRESS NOTES
Neurology H&P    Sophie Freedman Patient Status:  No patient class for patient encounter    1935 MRN [id number]   Location [facility], [address], [address] Attending No att. providers found   [facility] Day #  PCP Anup Walters MD     [facility] daily as needed. Disp: 45 tablet Rfl: 1   apixaban 5 MG Oral Tab Take 1 tablet (5 mg total) by mouth 2 (two) times daily. Disp: 180 tablet Rfl: 1   atorvastatin 40 MG Oral Tab Take 1 tablet (40 mg total) by mouth nightly.  Disp: 90 tablet Rfl: 1       Probl HYSTERECTOMY      Comment: Sean Spangler, [address]: [facility] NEEDLE LOCALIZATION W/ SPECIMEN 1 SITE RIG* Right      Comment: benign tumor  No date: OOPHORECTOMY Bilateral  07/00: TOTAL HIP REPLACEMENT Right      Comment: at [facility], [address]  2/6/2017: II to XII: PERRLA, no ptosis or diplopia, EOM intact, L VF cut in upper and lower quadrants, facial sensation intact, strong eye closure, L NLF flattening, shrug intact    MOTOR EXAMINATION: normal tone, no fasciculations, normal strength throughout in RUE within the normal range. There was no evidence     for stenosis. There was mild, 1-2+ regurgitation. 4. Pulmonary arteries: Systolic pressure was mildly to moderately increased,     estimated to be 45mm Hg.   5. Pericardium, extracardiac: A small pericardi

## 2018-07-23 DIAGNOSIS — I10 ESSENTIAL HYPERTENSION: ICD-10-CM

## 2018-07-24 RX ORDER — LABETALOL 300 MG/1
TABLET, FILM COATED ORAL
Qty: 180 TABLET | Refills: 0 | Status: SHIPPED | OUTPATIENT
Start: 2018-07-24 | End: 2018-10-25

## 2018-07-24 NOTE — TELEPHONE ENCOUNTER
LOV 4/18     LAST LAB 6/18    LAST RX     Dispensed 4/17/18  Labetalol HCl 300 MG Oral Tab 180 tablet 1 10/20/2017    Sig :  Take 300 mg by mouth 2 (two) times daily.            Next OV 8/15/2018    PROTOCOL  Hypertension Medications Protocol Passed    Refi

## 2018-08-15 ENCOUNTER — OFFICE VISIT (OUTPATIENT)
Dept: FAMILY MEDICINE CLINIC | Facility: CLINIC | Age: 83
End: 2018-08-15
Payer: COMMERCIAL

## 2018-08-15 VITALS
WEIGHT: 145 LBS | TEMPERATURE: 98 F | SYSTOLIC BLOOD PRESSURE: 136 MMHG | RESPIRATION RATE: 16 BRPM | HEART RATE: 63 BPM | HEIGHT: 61.5 IN | DIASTOLIC BLOOD PRESSURE: 78 MMHG | BODY MASS INDEX: 27.03 KG/M2 | OXYGEN SATURATION: 98 %

## 2018-08-15 DIAGNOSIS — I10 ESSENTIAL HYPERTENSION: ICD-10-CM

## 2018-08-15 DIAGNOSIS — E87.1 HYPONATREMIA: Primary | ICD-10-CM

## 2018-08-15 PROCEDURE — 99213 OFFICE O/P EST LOW 20 MIN: CPT | Performed by: FAMILY MEDICINE

## 2018-08-15 RX ORDER — SPIRONOLACTONE 25 MG/1
12.5 TABLET ORAL DAILY
Qty: 45 TABLET | Refills: 1 | Status: SHIPPED | OUTPATIENT
Start: 2018-08-15 | End: 2019-02-06

## 2018-08-15 NOTE — PROGRESS NOTES
Carlos Kiran IS A 82year old female [address] [facility] Patient presents with:  Lab Results: Go over labs        History of present illness: Had good summer, plans trip to [place].     Reviewed records: Dr. Almetta Hammans late June, stable, to continue apixaban and at Oral Tab TAKE ONE TABLET BY MOUTH TWICE DAILY Disp: 180 tablet Rfl: 0   apixaban 5 MG Oral Tab Take 1 tablet (5 mg total) by mouth 2 (two) times daily. Disp: 180 tablet Rfl: 1   atorvastatin 40 MG Oral Tab Take 1 tablet (40 mg total) by mouth nightly.  Disp Hobby Hazards No    Sleep Concern No    Stress Concern No    Weight Concern No    Special Diet No    Comment: little meat, a lot of veggies, some chicken    Exercise Yes    Comment: 2 days per week, water aerobics    Seat Belt Yes     Social History Narrat

## 2018-08-15 NOTE — PATIENT INSTRUCTIONS
Check blood test for sodium & kidney function in 2 months (October). Recommend start alendronate, it's proven to decrease risk of osteoporotic hip fracture and the risk of spontaneous side-effect fracture is between 1 in 10,000 and 1 in 100,000.     Rech

## 2018-09-14 ENCOUNTER — TELEPHONE (OUTPATIENT)
Dept: FAMILY MEDICINE CLINIC | Facility: CLINIC | Age: 83
End: 2018-09-14

## 2018-09-14 ENCOUNTER — OFFICE VISIT (OUTPATIENT)
Dept: FAMILY MEDICINE CLINIC | Facility: CLINIC | Age: 83
End: 2018-09-14
Payer: COMMERCIAL

## 2018-09-14 VITALS
OXYGEN SATURATION: 98 % | TEMPERATURE: 98 F | WEIGHT: 145.63 LBS | HEIGHT: 61.5 IN | RESPIRATION RATE: 14 BRPM | HEART RATE: 65 BPM | DIASTOLIC BLOOD PRESSURE: 74 MMHG | SYSTOLIC BLOOD PRESSURE: 124 MMHG | BODY MASS INDEX: 27.14 KG/M2

## 2018-09-14 DIAGNOSIS — M25.462 SWELLING OF JOINT OF LEFT KNEE: Primary | ICD-10-CM

## 2018-09-14 PROBLEM — M17.12 LOCALIZED OSTEOARTHRITIS OF LEFT KNEE: Status: ACTIVE | Noted: 2018-09-14

## 2018-09-14 PROCEDURE — 99213 OFFICE O/P EST LOW 20 MIN: CPT | Performed by: FAMILY MEDICINE

## 2018-09-14 RX ORDER — CELECOXIB 100 MG/1
100 CAPSULE ORAL 2 TIMES DAILY PRN
Qty: 30 CAPSULE | Refills: 1 | Status: SHIPPED | OUTPATIENT
Start: 2018-09-14 | End: 2019-04-04

## 2018-09-14 NOTE — PROGRESS NOTES
Jamshid Alexander IS A 82year old female HERE FOR Patient presents with:  Leg Pain: LT leg. Walking makes it worse        History of present illness: Flew on vacation to [place]. With walking has pain since a week ago. returned here Monday.      Trying asp needed for Pain (in knee). Disp: 30 capsule Rfl: 1   spironolactone 25 MG Oral Tab Take 0.5 tablets (12.5 mg total) by mouth daily.  Disp: 45 tablet Rfl: 1   LABETALOL  MG Oral Tab TAKE ONE TABLET BY MOUTH TWICE DAILY Disp: 180 tablet Rfl: 0   apixab Occupation: Taught nursery school    Tobacco Use      Smoking status: Former Smoker        Packs/day: 0.50        Years: 40.00        Pack years: 21        Quit date: 1995        Years since quittin.7      Smokeless tobacco: Never Used    [employer] INTERNAL    Other orders  -     celecoxib 100 MG Oral Cap; Take 1 capsule (100 mg total) by mouth 2 (two) times daily as needed for Pain (in knee).           Patient Instructions   X-ray knee to check for arthritis, I suspect this is a flare of osteoarthrit

## 2018-09-14 NOTE — TELEPHONE ENCOUNTER
Dr. Teo Hilton,  [facility] called to notify you of a potential drug interaction:    Celebrex and Eliquis- potential for increased bleeding

## 2018-09-14 NOTE — PATIENT INSTRUCTIONS
X-ray knee to check for arthritis, I suspect this is a flare of osteoarthritis. Trial of Celebrex 100 mg once or twice daily for pain.      Try heat in morning, ice in afternoon    Continue TYlenol & topical rubs (lidocaine in aspercreme)

## 2018-09-17 ENCOUNTER — APPOINTMENT (OUTPATIENT)
Dept: LAB | Age: 83
End: 2018-09-17
Attending: FAMILY MEDICINE
Payer: MEDICARE

## 2018-09-17 ENCOUNTER — HOSPITAL ENCOUNTER (OUTPATIENT)
Dept: GENERAL RADIOLOGY | Age: 83
Discharge: HOME | End: 2018-09-17
Attending: FAMILY MEDICINE
Payer: MEDICARE

## 2018-09-17 DIAGNOSIS — M25.462 SWELLING OF JOINT OF LEFT KNEE: ICD-10-CM

## 2018-09-17 DIAGNOSIS — I10 ESSENTIAL HYPERTENSION: ICD-10-CM

## 2018-09-17 DIAGNOSIS — E87.1 HYPONATREMIA: ICD-10-CM

## 2018-09-17 LAB
ANION GAP SERPL CALC-SCNC: 11 MMOL/L (ref 0–18)
BUN BLD-MCNC: 10 MG/DL (ref 8–20)
BUN/CREAT SERPL: 11.5 (ref 10–20)
CALCIUM BLD-MCNC: 9.2 MG/DL (ref 8.3–10.3)
CHLORIDE SERPL-SCNC: 104 MMOL/L (ref 101–111)
CO2 SERPL-SCNC: 21 MMOL/L (ref 22–32)
CREAT BLD-MCNC: 0.87 MG/DL (ref 0.55–1.02)
GLUCOSE BLD-MCNC: 83 MG/DL (ref 70–99)
OSMOLALITY SERPL CALC.SUM OF ELEC: 280 MOSM/KG (ref 275–295)
POTASSIUM SERPL-SCNC: 4.3 MMOL/L (ref 3.6–5.1)
SODIUM SERPL-SCNC: 136 MMOL/L (ref 136–144)

## 2018-09-17 PROCEDURE — 36415 COLL VENOUS BLD VENIPUNCTURE: CPT

## 2018-09-17 PROCEDURE — 80048 BASIC METABOLIC PNL TOTAL CA: CPT

## 2018-09-17 PROCEDURE — 73560 X-RAY EXAM OF KNEE 1 OR 2: CPT | Performed by: FAMILY MEDICINE

## 2018-09-25 ENCOUNTER — PRIOR ORIGINAL RECORDS (OUTPATIENT)
Dept: OTHER | Age: 83
End: 2018-09-25

## 2018-09-25 ENCOUNTER — MYAURORA ACCOUNT LINK (OUTPATIENT)
Dept: OTHER | Age: 83
End: 2018-09-25

## 2018-10-01 ENCOUNTER — MED REC SCAN ONLY (OUTPATIENT)
Dept: FAMILY MEDICINE CLINIC | Facility: CLINIC | Age: 83
End: 2018-10-01

## 2018-10-09 DIAGNOSIS — I10 ESSENTIAL HYPERTENSION: ICD-10-CM

## 2018-10-09 RX ORDER — LOSARTAN POTASSIUM 100 MG/1
TABLET ORAL
Qty: 90 TABLET | Refills: 1 | Status: SHIPPED | OUTPATIENT
Start: 2018-10-09 | End: 2019-03-05 | Stop reason: ALTCHOICE

## 2018-10-09 NOTE — TELEPHONE ENCOUNTER
Name from pharmacy: LOSARTAN 100MG   TAB          Will file in chart as: LOSARTAN 100 MG Oral Tab    Sig: TAKE ONE TABLET BY MOUTH ONCE DAILY    Disp:  90 tablet    Refills:  1    Start: 10/9/2018     Class: Normal    For: Essential hypertension    Request

## 2018-10-18 ENCOUNTER — IMMUNIZATION (OUTPATIENT)
Dept: FAMILY MEDICINE CLINIC | Facility: CLINIC | Age: 83
End: 2018-10-18
Payer: COMMERCIAL

## 2018-10-18 ENCOUNTER — MED REC SCAN ONLY (OUTPATIENT)
Dept: FAMILY MEDICINE CLINIC | Facility: CLINIC | Age: 83
End: 2018-10-18

## 2018-10-18 PROCEDURE — 90653 IIV ADJUVANT VACCINE IM: CPT | Performed by: PHYSICIAN ASSISTANT

## 2018-10-18 PROCEDURE — G0008 ADMIN INFLUENZA VIRUS VAC: HCPCS | Performed by: PHYSICIAN ASSISTANT

## 2018-10-25 DIAGNOSIS — I10 ESSENTIAL HYPERTENSION: ICD-10-CM

## 2018-10-25 RX ORDER — LABETALOL 300 MG/1
TABLET, FILM COATED ORAL
Qty: 180 TABLET | Refills: 1 | Status: SHIPPED | OUTPATIENT
Start: 2018-10-25 | End: 2019-04-04

## 2018-10-25 NOTE — TELEPHONE ENCOUNTER
Name from pharmacy: LABETALOL 300MG     TAB          Will file in chart as: LABETALOL  MG Oral Tab    Sig: TAKE 1 TABLET BY MOUTH TWICE DAILY    Disp:  180 tablet    Refills:  0    Start: 10/25/2018     Class: Normal    For: Essential hypertension

## 2018-11-26 ENCOUNTER — TELEPHONE (OUTPATIENT)
Dept: FAMILY MEDICINE CLINIC | Facility: CLINIC | Age: 83
End: 2018-11-26

## 2018-11-26 NOTE — TELEPHONE ENCOUNTER
Medical Record Request Received    Date received in office: 11-22-18    Requested from:[facility]     Records to be sent to: [employer] Fax [phone]           Date request sent to [employer]:

## 2018-12-14 ENCOUNTER — OFFICE VISIT (OUTPATIENT)
Dept: NEUROLOGY | Facility: CLINIC | Age: 83
End: 2018-12-14
Payer: COMMERCIAL

## 2018-12-14 VITALS
WEIGHT: 150 LBS | RESPIRATION RATE: 16 BRPM | DIASTOLIC BLOOD PRESSURE: 70 MMHG | HEART RATE: 68 BPM | BODY MASS INDEX: 28 KG/M2 | SYSTOLIC BLOOD PRESSURE: 130 MMHG

## 2018-12-14 DIAGNOSIS — I69.30 CHRONIC ISCHEMIC MULTIFOCAL MULTIPLE VASCULAR TERRITORIES STROKE: Primary | ICD-10-CM

## 2018-12-14 PROCEDURE — 99213 OFFICE O/P EST LOW 20 MIN: CPT | Performed by: OTHER

## 2018-12-14 NOTE — PROGRESS NOTES
Neurology H&P    Eugene Covington Patient Status:  No patient class for patient encounter    1935 MRN [id number]   Location ED [facility], [address], [address] Attending No att. providers found   [facility] Day #  PCP MD Nataliya Coyne atorvastatin 40 MG Oral Tab Take 1 tablet (40 mg total) by mouth nightly. Disp: 90 tablet Rfl: 1   Alendronate Sodium 70 MG Oral Tab Take 1 tablet (70 mg total) by mouth once a week.  Disp: 12 tablet Rfl: 1   furosemide 20 MG Oral Tab Take 1 tablet (20 mg History:   Procedure Laterality Date   • BREAST BIOPSY NEEDLE LOCALIZATION Left 11/9/2017    Performed by Merly Harrington MD at [facility] MAIN OR   • BREAST LUMPECTOMY Left 11/2009    benign fibroadenoma   • HERNIA SURGERY  97/79/09   • HERNIA UMBILICAL REPAIR dry  Extremities: No clubbing or cyanosis      Neurologic Exam  MENTAL STATUS: alert, ox3, normal attention, language and fund of knowledge.       CRANIAL NERVES II to XII: PERRLA, no ptosis or diplopia, EOM intact, L VF cut in upper and lower quadrants, fa valve: Structurally normal valve. Normal thickness leaflets.     Transvalvular velocity was within the normal range. There was no evidence     for stenosis. There was mild, 1-2+ regurgitation.   4. Pulmonary arteries: Systolic pressure was mildly to moderat

## 2019-01-19 DIAGNOSIS — I69.30 CHRONIC ISCHEMIC MULTIFOCAL MULTIPLE VASCULAR TERRITORIES STROKE: ICD-10-CM

## 2019-01-21 RX ORDER — ATORVASTATIN CALCIUM 40 MG/1
TABLET, FILM COATED ORAL
Qty: 90 TABLET | Refills: 2 | Status: SHIPPED | OUTPATIENT
Start: 2019-01-21 | End: 2019-04-04

## 2019-01-21 NOTE — TELEPHONE ENCOUNTER
Medication: ATORVASTATIN 40 MG Oral Tab    Date of last refill: 06/27/18 (#90/1)  [place]    Last office visit: 12/14/2018  Due back to clinic per last office note:  Around 12/14/19  Date next office visit scheduled:

## 2019-01-23 ENCOUNTER — TELEPHONE (OUTPATIENT)
Dept: FAMILY MEDICINE CLINIC | Facility: CLINIC | Age: 84
End: 2019-01-23

## 2019-01-23 NOTE — TELEPHONE ENCOUNTER
Pt thinks she needs lab work prior to her next appt.     Future Appointments   Date Time Provider Nu Lopez   2/6/2019 11:00 AM Maximiliano Jimenez MD [facility] IM   3/5/2019  1:30 PM LOS Acosta POD Bravo Nap   6/11/2019  1:30

## 2019-01-24 NOTE — TELEPHONE ENCOUNTER
VMML for patient and informed labs not required before appt. Dr. Viviana Rivera  Will see her at appt. And determine if additional labs are needed.

## 2019-02-06 ENCOUNTER — OFFICE VISIT (OUTPATIENT)
Dept: FAMILY MEDICINE CLINIC | Facility: CLINIC | Age: 84
End: 2019-02-06
Payer: COMMERCIAL

## 2019-02-06 VITALS
HEIGHT: 61 IN | RESPIRATION RATE: 14 BRPM | DIASTOLIC BLOOD PRESSURE: 60 MMHG | OXYGEN SATURATION: 97 % | HEART RATE: 67 BPM | WEIGHT: 146 LBS | TEMPERATURE: 98 F | SYSTOLIC BLOOD PRESSURE: 120 MMHG | BODY MASS INDEX: 27.56 KG/M2

## 2019-02-06 DIAGNOSIS — E78.00 HYPERCHOLESTEROLEMIA: ICD-10-CM

## 2019-02-06 DIAGNOSIS — I10 ESSENTIAL HYPERTENSION: ICD-10-CM

## 2019-02-06 DIAGNOSIS — D51.9 ANEMIA DUE TO VITAMIN B12 DEFICIENCY, UNSPECIFIED B12 DEFICIENCY TYPE: ICD-10-CM

## 2019-02-06 DIAGNOSIS — L93.0 DISCOID LUPUS: Primary | ICD-10-CM

## 2019-02-06 DIAGNOSIS — L30.9 CHRONIC DERMATITIS: ICD-10-CM

## 2019-02-06 DIAGNOSIS — E87.1 HYPONATREMIA: ICD-10-CM

## 2019-02-06 PROCEDURE — 99213 OFFICE O/P EST LOW 20 MIN: CPT | Performed by: FAMILY MEDICINE

## 2019-02-06 RX ORDER — MELATONIN
1000 DAILY
Qty: 90 TABLET | Refills: 1 | COMMUNITY
Start: 2019-02-06

## 2019-02-06 RX ORDER — SPIRONOLACTONE 25 MG/1
12.5 TABLET ORAL DAILY
Qty: 45 TABLET | Refills: 1 | Status: SHIPPED | OUTPATIENT
Start: 2019-02-06 | End: 2019-04-04

## 2019-02-06 NOTE — PROGRESS NOTES
Liz Floyd IS A 82year old female HERE FOR Patient presents with:  Leg Pain: F/U        History of present illness:     Leg not bothering her now. Not needing any meds now for it, not on celecoxib. Needs spironolactone. 1/2 tablet daily.  Takes furo Medications:  spironolactone 25 MG Oral Tab Take 0.5 tablets (12.5 mg total) by mouth daily. Disp: 45 tablet Rfl: 1   Vitamin B-12 1000 MCG Oral Tab Take 1 tablet (1,000 mcg total) by mouth daily.  Disp: 90 tablet Rfl: 1   ATORVASTATIN 40 MG Oral Tab TAKE 1 Occupation: Retired         Comment: retired 1994 from [employer]      Occupation: Taught nursery school    Tobacco Use      Smoking status: Former Smoker        Packs/day: 0.50        Years: 40.00        Pack years: 21        Quit date: METABOLIC PANEL (14); Future    Hyponatremia    Anemia due to vitamin B12 deficiency, unspecified B12 deficiency type  -     CBC WITH DIFFERENTIAL WITH PLATELET; Future  -     Vitamin B-12 1000 MCG Oral Tab; Take 1 tablet (1,000 mcg total) by mouth daily.

## 2019-02-06 NOTE — PATIENT INSTRUCTIONS
Eucerin, Clemencia, or Lubriderm moisturizer creams for dry skin     We can refer back to [facility] if/when needed. (referral entered for DR. Gentry Hernandez). Recheck in a month or so for annual well check.

## 2019-02-28 VITALS
HEIGHT: 63 IN | WEIGHT: 144 LBS | HEART RATE: 65 BPM | BODY MASS INDEX: 25.52 KG/M2 | DIASTOLIC BLOOD PRESSURE: 70 MMHG | SYSTOLIC BLOOD PRESSURE: 140 MMHG

## 2019-02-28 VITALS
HEIGHT: 63 IN | WEIGHT: 147 LBS | BODY MASS INDEX: 26.05 KG/M2 | SYSTOLIC BLOOD PRESSURE: 126 MMHG | DIASTOLIC BLOOD PRESSURE: 72 MMHG | HEART RATE: 72 BPM

## 2019-03-01 VITALS
SYSTOLIC BLOOD PRESSURE: 122 MMHG | HEART RATE: 74 BPM | WEIGHT: 140 LBS | HEIGHT: 63 IN | BODY MASS INDEX: 24.8 KG/M2 | DIASTOLIC BLOOD PRESSURE: 70 MMHG

## 2019-03-01 VITALS
BODY MASS INDEX: 25.16 KG/M2 | SYSTOLIC BLOOD PRESSURE: 124 MMHG | HEIGHT: 63 IN | WEIGHT: 142 LBS | HEART RATE: 74 BPM | DIASTOLIC BLOOD PRESSURE: 66 MMHG

## 2019-03-01 VITALS
SYSTOLIC BLOOD PRESSURE: 130 MMHG | HEIGHT: 63 IN | HEART RATE: 60 BPM | WEIGHT: 144 LBS | DIASTOLIC BLOOD PRESSURE: 60 MMHG | BODY MASS INDEX: 25.52 KG/M2

## 2019-03-01 VITALS
WEIGHT: 144 LBS | SYSTOLIC BLOOD PRESSURE: 162 MMHG | DIASTOLIC BLOOD PRESSURE: 74 MMHG | HEART RATE: 62 BPM | BODY MASS INDEX: 25.52 KG/M2 | HEIGHT: 63 IN

## 2019-03-05 ENCOUNTER — TELEPHONE (OUTPATIENT)
Dept: FAMILY MEDICINE CLINIC | Facility: CLINIC | Age: 84
End: 2019-03-05

## 2019-03-05 RX ORDER — OLMESARTAN MEDOXOMIL 40 MG/1
40 TABLET ORAL DAILY
Qty: 30 TABLET | Refills: 2 | Status: SHIPPED | OUTPATIENT
Start: 2019-03-05 | End: 2019-04-04 | Stop reason: ALTCHOICE

## 2019-03-05 NOTE — TELEPHONE ENCOUNTER
Left detailed message.
Losartan 100 MG is on back order pharmacy requesting an alternative
Will change to olmesartan 40 mg daily. #30 2 refills to see if tolerated ok, recommend BP check within 2 months.
not examined

## 2019-03-14 ENCOUNTER — LABORATORY ENCOUNTER (OUTPATIENT)
Dept: LAB | Age: 84
End: 2019-03-14
Attending: FAMILY MEDICINE
Payer: MEDICARE

## 2019-03-14 DIAGNOSIS — D51.9 ANEMIA DUE TO VITAMIN B12 DEFICIENCY, UNSPECIFIED B12 DEFICIENCY TYPE: ICD-10-CM

## 2019-03-14 DIAGNOSIS — E78.00 HYPERCHOLESTEROLEMIA: ICD-10-CM

## 2019-03-14 DIAGNOSIS — I10 ESSENTIAL HYPERTENSION: ICD-10-CM

## 2019-03-14 LAB
ALBUMIN SERPL-MCNC: 3.5 G/DL (ref 3.4–5)
ALBUMIN/GLOB SERPL: 0.8 {RATIO} (ref 1–2)
ALP LIVER SERPL-CCNC: 111 U/L (ref 55–142)
ALT SERPL-CCNC: 29 U/L (ref 13–56)
ANION GAP SERPL CALC-SCNC: 7 MMOL/L (ref 0–18)
AST SERPL-CCNC: 32 U/L (ref 15–37)
BASOPHILS # BLD AUTO: 0.03 X10(3) UL (ref 0–0.2)
BASOPHILS NFR BLD AUTO: 0.5 %
BILIRUB SERPL-MCNC: 1.1 MG/DL (ref 0.1–2)
BUN BLD-MCNC: 9 MG/DL (ref 7–18)
BUN/CREAT SERPL: 9.8 (ref 10–20)
CALCIUM BLD-MCNC: 8.9 MG/DL (ref 8.5–10.1)
CHLORIDE SERPL-SCNC: 106 MMOL/L (ref 98–107)
CHOLEST SMN-MCNC: 179 MG/DL (ref ?–200)
CO2 SERPL-SCNC: 25 MMOL/L (ref 21–32)
CREAT BLD-MCNC: 0.92 MG/DL (ref 0.55–1.02)
DEPRECATED RDW RBC AUTO: 47.3 FL (ref 35.1–46.3)
EOSINOPHIL # BLD AUTO: 0.04 X10(3) UL (ref 0–0.7)
EOSINOPHIL NFR BLD AUTO: 0.7 %
ERYTHROCYTE [DISTWIDTH] IN BLOOD BY AUTOMATED COUNT: 12.5 % (ref 11–15)
GLOBULIN PLAS-MCNC: 4.5 G/DL (ref 2.8–4.4)
GLUCOSE BLD-MCNC: 91 MG/DL (ref 70–99)
HCT VFR BLD AUTO: 36.3 % (ref 35–48)
HDLC SERPL-MCNC: 120 MG/DL (ref 40–59)
HGB BLD-MCNC: 12.2 G/DL (ref 12–16)
IMM GRANULOCYTES # BLD AUTO: 0.01 X10(3) UL (ref 0–1)
IMM GRANULOCYTES NFR BLD: 0.2 %
LDLC SERPL CALC-MCNC: 48 MG/DL (ref ?–100)
LYMPHOCYTES # BLD AUTO: 1.05 X10(3) UL (ref 1–4)
LYMPHOCYTES NFR BLD AUTO: 18.8 %
M PROTEIN MFR SERPL ELPH: 8 G/DL (ref 6.4–8.2)
MCH RBC QN AUTO: 34.4 PG (ref 26–34)
MCHC RBC AUTO-ENTMCNC: 33.6 G/DL (ref 31–37)
MCV RBC AUTO: 102.3 FL (ref 80–100)
MONOCYTES # BLD AUTO: 0.52 X10(3) UL (ref 0.1–1)
MONOCYTES NFR BLD AUTO: 9.3 %
NEUTROPHILS # BLD AUTO: 3.95 X10 (3) UL (ref 1.5–7.7)
NEUTROPHILS # BLD AUTO: 3.95 X10(3) UL (ref 1.5–7.7)
NEUTROPHILS NFR BLD AUTO: 70.5 %
NONHDLC SERPL-MCNC: 59 MG/DL (ref ?–130)
OSMOLALITY SERPL CALC.SUM OF ELEC: 284 MOSM/KG (ref 275–295)
PLATELET # BLD AUTO: 228 10(3)UL (ref 150–450)
POTASSIUM SERPL-SCNC: 4.3 MMOL/L (ref 3.5–5.1)
RBC # BLD AUTO: 3.55 X10(6)UL (ref 3.8–5.3)
SODIUM SERPL-SCNC: 138 MMOL/L (ref 136–145)
TRIGL SERPL-MCNC: 55 MG/DL (ref 30–149)
VLDLC SERPL CALC-MCNC: 11 MG/DL (ref 0–30)
WBC # BLD AUTO: 5.6 X10(3) UL (ref 4–11)

## 2019-03-14 PROCEDURE — 36415 COLL VENOUS BLD VENIPUNCTURE: CPT

## 2019-03-14 PROCEDURE — 80053 COMPREHEN METABOLIC PANEL: CPT

## 2019-03-14 PROCEDURE — 80061 LIPID PANEL: CPT

## 2019-03-14 PROCEDURE — 85025 COMPLETE CBC W/AUTO DIFF WBC: CPT

## 2019-04-03 DIAGNOSIS — I10 ESSENTIAL HYPERTENSION: ICD-10-CM

## 2019-04-04 ENCOUNTER — OFFICE VISIT (OUTPATIENT)
Dept: FAMILY MEDICINE CLINIC | Facility: CLINIC | Age: 84
End: 2019-04-04
Payer: COMMERCIAL

## 2019-04-04 VITALS
WEIGHT: 148.38 LBS | BODY MASS INDEX: 28.01 KG/M2 | DIASTOLIC BLOOD PRESSURE: 70 MMHG | HEART RATE: 64 BPM | RESPIRATION RATE: 15 BRPM | SYSTOLIC BLOOD PRESSURE: 116 MMHG | TEMPERATURE: 98 F | HEIGHT: 61 IN | OXYGEN SATURATION: 97 %

## 2019-04-04 DIAGNOSIS — I48.0 PAROXYSMAL ATRIAL FIBRILLATION (HCC): ICD-10-CM

## 2019-04-04 DIAGNOSIS — I10 ESSENTIAL HYPERTENSION: ICD-10-CM

## 2019-04-04 DIAGNOSIS — N60.19 FIBROCYSTIC BREAST CHANGES, UNSPECIFIED LATERALITY: ICD-10-CM

## 2019-04-04 DIAGNOSIS — I27.20 PULMONARY HYPERTENSION, MODERATE TO SEVERE (HCC): ICD-10-CM

## 2019-04-04 DIAGNOSIS — Z85.42 HISTORY OF ENDOMETRIAL CANCER: ICD-10-CM

## 2019-04-04 DIAGNOSIS — M17.12 LOCALIZED OSTEOARTHRITIS OF LEFT KNEE: ICD-10-CM

## 2019-04-04 DIAGNOSIS — I65.21 STENOSIS OF RIGHT CAROTID ARTERY: ICD-10-CM

## 2019-04-04 DIAGNOSIS — Z86.73 H/O ISCHEMIC RIGHT PCA STROKE: ICD-10-CM

## 2019-04-04 DIAGNOSIS — R76.8 POSITIVE ANA (ANTINUCLEAR ANTIBODY): ICD-10-CM

## 2019-04-04 DIAGNOSIS — Z00.00 ENCOUNTER FOR ANNUAL HEALTH EXAMINATION: ICD-10-CM

## 2019-04-04 DIAGNOSIS — M81.0 AGE-RELATED OSTEOPOROSIS WITHOUT CURRENT PATHOLOGICAL FRACTURE: ICD-10-CM

## 2019-04-04 DIAGNOSIS — Z87.19 H/O UMBILICAL HERNIA REPAIR: ICD-10-CM

## 2019-04-04 DIAGNOSIS — L93.0 DISCOID LUPUS: ICD-10-CM

## 2019-04-04 DIAGNOSIS — Z79.01 CHRONIC ANTICOAGULATION: ICD-10-CM

## 2019-04-04 DIAGNOSIS — Z98.890 H/O UMBILICAL HERNIA REPAIR: ICD-10-CM

## 2019-04-04 DIAGNOSIS — I70.0 THORACIC AORTA ATHEROSCLEROSIS (HCC): ICD-10-CM

## 2019-04-04 DIAGNOSIS — I27.20 PULMONARY HYPERTENSION (HCC): ICD-10-CM

## 2019-04-04 DIAGNOSIS — Z96.641 STATUS POST TOTAL REPLACEMENT OF RIGHT HIP: ICD-10-CM

## 2019-04-04 DIAGNOSIS — E78.00 HYPERCHOLESTEROLEMIA: ICD-10-CM

## 2019-04-04 DIAGNOSIS — N60.92 ATYPICAL DUCTAL HYPERPLASIA OF LEFT BREAST: Primary | ICD-10-CM

## 2019-04-04 DIAGNOSIS — D51.8 MACROCYTIC ANEMIA WITH VITAMIN B12 DEFICIENCY: ICD-10-CM

## 2019-04-04 DIAGNOSIS — I69.30 CHRONIC ISCHEMIC MULTIFOCAL MULTIPLE VASCULAR TERRITORIES STROKE: ICD-10-CM

## 2019-04-04 DIAGNOSIS — Z90.710 HISTORY OF HYSTERECTOMY: ICD-10-CM

## 2019-04-04 DIAGNOSIS — H26.9 CATARACT, UNSPECIFIED CATARACT TYPE, UNSPECIFIED LATERALITY: ICD-10-CM

## 2019-04-04 DIAGNOSIS — Z78.0 POSTMENOPAUSAL: ICD-10-CM

## 2019-04-04 DIAGNOSIS — J44.9 ASTHMA WITH COPD (CHRONIC OBSTRUCTIVE PULMONARY DISEASE) (HCC): ICD-10-CM

## 2019-04-04 DIAGNOSIS — Z86.73 HISTORY OF ISCHEMIC MULTIFOCAL MULTIPLE VASCULAR TERRITORIES STROKE: ICD-10-CM

## 2019-04-04 PROBLEM — L84 CALLUS OF FOOT: Status: RESOLVED | Noted: 2017-07-28 | Resolved: 2019-04-04

## 2019-04-04 PROCEDURE — G0439 PPPS, SUBSEQ VISIT: HCPCS | Performed by: FAMILY MEDICINE

## 2019-04-04 PROCEDURE — 96160 PT-FOCUSED HLTH RISK ASSMT: CPT | Performed by: FAMILY MEDICINE

## 2019-04-04 PROCEDURE — 99397 PER PM REEVAL EST PAT 65+ YR: CPT | Performed by: FAMILY MEDICINE

## 2019-04-04 RX ORDER — ATORVASTATIN CALCIUM 40 MG/1
TABLET, FILM COATED ORAL
Qty: 90 TABLET | Refills: 1 | Status: SHIPPED | OUTPATIENT
Start: 2019-04-04 | End: 2020-06-26

## 2019-04-04 RX ORDER — LOSARTAN POTASSIUM 100 MG/1
TABLET ORAL
Qty: 90 TABLET | Refills: 1 | OUTPATIENT
Start: 2019-04-04

## 2019-04-04 RX ORDER — LABETALOL 300 MG/1
300 TABLET, FILM COATED ORAL 2 TIMES DAILY
Qty: 180 TABLET | Refills: 1 | Status: SHIPPED | OUTPATIENT
Start: 2019-04-04 | End: 2019-10-07

## 2019-04-04 RX ORDER — FUROSEMIDE 20 MG/1
20 TABLET ORAL DAILY PRN
Qty: 45 TABLET | Refills: 1 | Status: SHIPPED | OUTPATIENT
Start: 2019-04-04 | End: 2020-03-02

## 2019-04-04 RX ORDER — ALENDRONATE SODIUM 70 MG/1
70 TABLET ORAL WEEKLY
Qty: 12 TABLET | Refills: 1 | Status: SHIPPED | OUTPATIENT
Start: 2019-04-04 | End: 2019-10-04

## 2019-04-04 RX ORDER — LOSARTAN POTASSIUM 100 MG/1
100 TABLET ORAL DAILY
Qty: 90 TABLET | Refills: 1 | Status: SHIPPED | OUTPATIENT
Start: 2019-04-04 | End: 2019-10-07

## 2019-04-04 RX ORDER — SPIRONOLACTONE 25 MG/1
12.5 TABLET ORAL DAILY
Qty: 45 TABLET | Refills: 1 | Status: SHIPPED | OUTPATIENT
Start: 2019-04-04 | End: 2020-03-02 | Stop reason: SINTOL

## 2019-04-04 NOTE — PROGRESS NOTES
HPI:   Osman Pickett is a 82year old female who presents for a MA (Medicare Advantage) [address] [facility] (Once per calendar year).     Here for well exam & assessment  Her last annual assessment has been over 1 year: Annual Physical due on 02/19/2019      NO CVA Never Used       Ms. Nayeli Tijerina does not currently take aspirin. We discussed the risks and benefits of aspirin therapy.    Pravinlang Verdugo is unable to use daily aspirin therapy For the following reasons:   Already on other anticoagulant usage such as Plavix, [place] Jeans since 11/17     Difficulty walking--improved. Asthma with COPD (chronic obstructive pulmonary disease) (HCC) no current symptoms     Localized osteoarthritis of left knee--not much trouble, needs to do more stairs per pt.      Wt Readings from Last 3 En ([facility] (2009), Fibrocystic breast changes (2009), Hypertension, Hyponatremia (2009), Localized, secondary osteoarthritis of the lower leg, Mild intermittent asthma without complication (9/52/4083), Osteoarthritis of hip (11/09), Pneumonia due to organism, S tenderness   Throat: Normal Lips, mucosa, and tongue normal; teeth and gums normal   Neck: Supple, symmetrical, trachea midline, no adenopathy;  thyroid: not enlarged, symmetric, no tenderness/mass/nodules; no carotid bruit or JVD   Back:   Symmetric, no c TAKE 1 TABLET BY MOUTH NIGHTLY  -     apixaban 5 MG Oral Tab; Take 1 tablet (5 mg total) by mouth 2 (two) times daily.     Paroxysmal atrial fibrillation (HCC)  -     CARDIO - INTERNAL    Pulmonary hypertension ([facility]  -     CARDIO - INTERNAL    Thoracic aor your appetite been poor?: No  How does the patient maintain a good energy level?: Other;Daily Walks(Housework, Water aerobics )  How would you describe your daily physical activity?: Moderate  How would you describe your current health state?: Good  How do high risk No results found for: CHLAMYDIA No flowsheet data found. Screening Mammogram      Mammogram Annually to 78, then as discussed There are no preventive care reminders to display for this patient.  Update Bkam if applicable     Immun flowsheet data found. Template: [employer]UR[employer] MEDICARE ANNUAL ASSESSMENT FEMALE [74132]  Patient Instructions     Restart alendronate. Try a different day than Sunday.  Take medicine with a full glass of water and stay up, can take other medicines a ha 03/14/2019 179     Triglycerides (mg/dL)   Date Value   03/14/2019 55        EKG - covered if needed at Welcome to Medicare, and non-screening if indicated for medical reasons Electrocardiogram date11/01/2017 Routine EKG is not a screening covered servic Every 3 yrs age 21-65 or Pap+HPV every 5 yrs age 33-67, Covered every 2 yrs up to age 79 or Yearly if High Risk   There are no preventive care reminders to display for this patient.      Chlamydia  Annually if high risk No results found for: CHLAMYDIA No fl http://www. idph.state. [place]/public/books/advin.htm  A link to the [employer]. This site has a lot of good information including definitions of the different types of Advance Directives.  It also has the State forms available on it's webs

## 2019-04-04 NOTE — TELEPHONE ENCOUNTER
Future Appointments   Date Time Provider Nu Lopez   4/4/2019  9:30 AM Ashley Aburto MD [facility] IM   6/11/2019  1:30 PM Dane Chance DPM SP POD Bravo Mccormick     Denied Refill at appt.

## 2019-04-04 NOTE — PATIENT INSTRUCTIONS
Restart alendronate. Try a different day than Sunday. Take medicine with a full glass of water and stay up, can take other medicines a half hour later. Restart consistent B12. Referrals to neurology, cardiology, ophthalmology.      Refills on your us and non-screening if indicated for medical reasons Electrocardiogram date11/01/2017 Routine EKG is not a screening covered service except at the [place] to Medicare Visit    Abdominal aortic aneurysm screening (once between ages 73-68) IPPE only No results preventive care reminders to display for this patient. Chlamydia  Annually if high risk No results found for: CHLAMYDIA No flowsheet data found.     Screening Mammogram      Mammogram    Recommend Annually to at least age 76, and as needed after 76 Ther information including definitions of the different types of Advance Directives. It also has the State forms available on it's website for anyone to review and print using their home computer and printer. (the forms are also available in [address])  [email or web address]

## 2019-04-15 RX ORDER — ATORVASTATIN CALCIUM 40 MG/1
TABLET, FILM COATED ORAL
COMMUNITY
End: 2019-09-24 | Stop reason: SDUPTHER

## 2019-04-15 RX ORDER — APIXABAN 5 MG/1
TABLET, FILM COATED ORAL
COMMUNITY
End: 2019-09-24 | Stop reason: SDUPTHER

## 2019-04-15 RX ORDER — SPIRONOLACTONE 25 MG/1
TABLET ORAL
COMMUNITY
End: 2019-09-24 | Stop reason: SDUPTHER

## 2019-04-15 RX ORDER — LOSARTAN POTASSIUM 100 MG/1
TABLET ORAL
COMMUNITY
End: 2019-09-24 | Stop reason: SDUPTHER

## 2019-04-15 RX ORDER — LABETALOL 300 MG/1
TABLET, FILM COATED ORAL
COMMUNITY
End: 2019-09-24 | Stop reason: SDUPTHER

## 2019-09-16 ENCOUNTER — PATIENT OUTREACH (OUTPATIENT)
Dept: CASE MANAGEMENT | Age: 84
End: 2019-09-16

## 2019-09-24 ENCOUNTER — OFFICE VISIT (OUTPATIENT)
Dept: CARDIOLOGY | Age: 84
End: 2019-09-24

## 2019-09-24 VITALS
DIASTOLIC BLOOD PRESSURE: 62 MMHG | SYSTOLIC BLOOD PRESSURE: 122 MMHG | BODY MASS INDEX: 27.94 KG/M2 | WEIGHT: 148 LBS | HEART RATE: 68 BPM | HEIGHT: 61 IN

## 2019-09-24 DIAGNOSIS — I65.21 STENOSIS OF RIGHT CAROTID ARTERY: ICD-10-CM

## 2019-09-24 DIAGNOSIS — I10 ESSENTIAL HYPERTENSION: Primary | ICD-10-CM

## 2019-09-24 DIAGNOSIS — I48.0 PAROXYSMAL ATRIAL FIBRILLATION (CMD): ICD-10-CM

## 2019-09-24 DIAGNOSIS — I63.9 CEREBROVASCULAR ACCIDENT (CVA), UNSPECIFIED MECHANISM (CMD): ICD-10-CM

## 2019-09-24 PROCEDURE — 3078F DIAST BP <80 MM HG: CPT | Performed by: INTERNAL MEDICINE

## 2019-09-24 PROCEDURE — 99214 OFFICE O/P EST MOD 30 MIN: CPT | Performed by: INTERNAL MEDICINE

## 2019-09-24 PROCEDURE — 3074F SYST BP LT 130 MM HG: CPT | Performed by: INTERNAL MEDICINE

## 2019-09-24 RX ORDER — APIXABAN 5 MG/1
5 TABLET, FILM COATED ORAL EVERY 12 HOURS SCHEDULED
Qty: 180 TABLET | Refills: 3 | Status: SHIPPED | OUTPATIENT
Start: 2019-09-24 | End: 2021-05-25

## 2019-09-24 RX ORDER — LABETALOL 300 MG/1
300 TABLET, FILM COATED ORAL 2 TIMES DAILY
Qty: 180 TABLET | Refills: 3 | Status: SHIPPED | OUTPATIENT
Start: 2019-09-24 | End: 2020-10-13 | Stop reason: SDUPTHER

## 2019-09-24 RX ORDER — ATORVASTATIN CALCIUM 40 MG/1
40 TABLET, FILM COATED ORAL DAILY
Qty: 90 TABLET | Refills: 3 | Status: SHIPPED | OUTPATIENT
Start: 2019-09-24

## 2019-09-24 RX ORDER — SPIRONOLACTONE 25 MG/1
12.5 TABLET ORAL DAILY
Qty: 45 TABLET | Refills: 3 | Status: SHIPPED | OUTPATIENT
Start: 2019-09-24 | End: 2020-10-13

## 2019-09-24 RX ORDER — LOSARTAN POTASSIUM 100 MG/1
100 TABLET ORAL DAILY
Qty: 90 TABLET | Refills: 3 | Status: SHIPPED | OUTPATIENT
Start: 2019-09-24

## 2019-10-04 RX ORDER — BIOTIN 2500 MCG
1 CAPSULE ORAL DAILY
Qty: 30 CAPSULE | Refills: 0 | COMMUNITY
Start: 2019-10-04 | End: 2021-03-29 | Stop reason: ALTCHOICE

## 2019-10-07 ENCOUNTER — OFFICE VISIT (OUTPATIENT)
Dept: FAMILY MEDICINE CLINIC | Facility: CLINIC | Age: 84
End: 2019-10-07
Payer: COMMERCIAL

## 2019-10-07 VITALS
SYSTOLIC BLOOD PRESSURE: 102 MMHG | OXYGEN SATURATION: 99 % | BODY MASS INDEX: 27 KG/M2 | WEIGHT: 143 LBS | TEMPERATURE: 97 F | HEART RATE: 60 BPM | DIASTOLIC BLOOD PRESSURE: 60 MMHG | HEIGHT: 61 IN | RESPIRATION RATE: 16 BRPM

## 2019-10-07 DIAGNOSIS — J44.9 ASTHMA WITH COPD (CHRONIC OBSTRUCTIVE PULMONARY DISEASE) (HCC): ICD-10-CM

## 2019-10-07 DIAGNOSIS — Z23 NEED FOR VACCINATION: ICD-10-CM

## 2019-10-07 DIAGNOSIS — I10 ESSENTIAL HYPERTENSION: Primary | ICD-10-CM

## 2019-10-07 DIAGNOSIS — D51.8 MACROCYTIC ANEMIA WITH VITAMIN B12 DEFICIENCY: ICD-10-CM

## 2019-10-07 PROCEDURE — 99214 OFFICE O/P EST MOD 30 MIN: CPT | Performed by: FAMILY MEDICINE

## 2019-10-07 PROCEDURE — 90662 IIV NO PRSV INCREASED AG IM: CPT | Performed by: FAMILY MEDICINE

## 2019-10-07 PROCEDURE — G0008 ADMIN INFLUENZA VIRUS VAC: HCPCS | Performed by: FAMILY MEDICINE

## 2019-10-07 RX ORDER — LABETALOL 300 MG/1
300 TABLET, FILM COATED ORAL 2 TIMES DAILY
Qty: 180 TABLET | Refills: 1 | Status: SHIPPED | OUTPATIENT
Start: 2019-10-07 | End: 2020-03-16

## 2019-10-07 RX ORDER — LOSARTAN POTASSIUM 100 MG/1
100 TABLET ORAL DAILY
Qty: 90 TABLET | Refills: 1 | Status: SHIPPED | OUTPATIENT
Start: 2019-10-07 | End: 2020-03-16

## 2019-10-07 NOTE — PROGRESS NOTES
Aisha White IS A 82year old female HERE FOR Patient presents with:  Medication Follow-Up  Imm/Inj: Flu shot       History of present illness:     No major travel. No falls. Took BP med today with only some juice.  Typically takes it later in [facility] by mouth daily. Disp: 30 capsule Rfl: 0   Psyllium (METAMUCIL OR)  Disp:  Rfl:    spironolactone 25 MG Oral Tab Take 0.5 tablets (12.5 mg total) by mouth daily.  Disp: 45 tablet Rfl: 1   atorvastatin 40 MG Oral Tab TAKE 1 TABLET BY MOUTH NIGHTLY Disp: 90 ta quittin.7      Smokeless tobacco: Never Used    Substance and Sexual Activity      Alcohol use: Yes        Comment: 4x weekly Rum       Drug use: No      Sexual activity: Yes        Partners: Male    Lifestyle      Physical activity:        Days per w nontender  Extremities pulses normal moderate edema lower legs, mild pitting, and varicosities. .     Test results:   None recent, encourage f/u on vivian, DEXA & lab order.      Assessment & Plan:   Latoya Robin was seen today for medication follow-up and imm/inj.

## 2019-10-07 NOTE — PATIENT INSTRUCTIONS
Follow up with mammogram, bone density, and lab tests soon. See me in March or April for well visit and followup on meds.

## 2019-10-10 ENCOUNTER — LAB ENCOUNTER (OUTPATIENT)
Dept: LAB | Age: 84
End: 2019-10-10
Attending: FAMILY MEDICINE
Payer: MEDICARE

## 2019-10-10 DIAGNOSIS — E78.00 HYPERCHOLESTEROLEMIA: ICD-10-CM

## 2019-10-10 DIAGNOSIS — I10 ESSENTIAL HYPERTENSION: ICD-10-CM

## 2019-10-10 DIAGNOSIS — D51.8 MACROCYTIC ANEMIA WITH VITAMIN B12 DEFICIENCY: ICD-10-CM

## 2019-10-10 PROCEDURE — 82607 VITAMIN B-12: CPT

## 2019-10-10 PROCEDURE — 36415 COLL VENOUS BLD VENIPUNCTURE: CPT

## 2019-10-10 PROCEDURE — 80061 LIPID PANEL: CPT

## 2019-10-10 PROCEDURE — 80053 COMPREHEN METABOLIC PANEL: CPT

## 2019-10-10 PROCEDURE — 85025 COMPLETE CBC W/AUTO DIFF WBC: CPT

## 2019-10-30 ENCOUNTER — PATIENT OUTREACH (OUTPATIENT)
Dept: CASE MANAGEMENT | Age: 84
End: 2019-10-30

## 2019-11-05 ENCOUNTER — HOSPITAL ENCOUNTER (OUTPATIENT)
Dept: MAMMOGRAPHY | Facility: HOSPITAL | Age: 84
Discharge: HOME | End: 2019-11-05
Attending: FAMILY MEDICINE
Payer: MEDICARE

## 2019-11-05 DIAGNOSIS — N60.92 ATYPICAL DUCTAL HYPERPLASIA OF LEFT BREAST: ICD-10-CM

## 2019-11-05 PROCEDURE — 77066 DX MAMMO INCL CAD BI: CPT | Performed by: FAMILY MEDICINE

## 2019-11-05 PROCEDURE — 77062 BREAST TOMOSYNTHESIS BI: CPT | Performed by: FAMILY MEDICINE

## 2019-12-30 ENCOUNTER — PATIENT OUTREACH (OUTPATIENT)
Dept: CASE MANAGEMENT | Age: 84
End: 2019-12-30

## 2020-01-21 ENCOUNTER — TELEPHONE (OUTPATIENT)
Dept: FAMILY MEDICINE CLINIC | Facility: CLINIC | Age: 85
End: 2020-01-21

## 2020-01-27 ENCOUNTER — HOSPITAL ENCOUNTER (OUTPATIENT)
Dept: CARDIOLOGY CLINIC | Facility: HOSPITAL | Age: 85
Discharge: HOME | End: 2020-01-27
Attending: INTERNAL MEDICINE
Payer: MEDICARE

## 2020-01-27 DIAGNOSIS — I65.21 STENOSIS OF RIGHT CAROTID ARTERY: ICD-10-CM

## 2020-01-27 DIAGNOSIS — I63.9 CEREBROVASCULAR ACCIDENT (CVA), UNSPECIFIED MECHANISM (HCC): ICD-10-CM

## 2020-01-27 PROCEDURE — 93880 EXTRACRANIAL BILAT STUDY: CPT | Performed by: INTERNAL MEDICINE

## 2020-01-30 ENCOUNTER — TELEPHONE (OUTPATIENT)
Dept: CARDIOLOGY | Age: 85
End: 2020-01-30

## 2020-02-01 ENCOUNTER — MED REC SCAN ONLY (OUTPATIENT)
Dept: FAMILY MEDICINE CLINIC | Facility: CLINIC | Age: 85
End: 2020-02-01

## 2020-02-01 ENCOUNTER — OFFICE VISIT (OUTPATIENT)
Dept: FAMILY MEDICINE CLINIC | Facility: CLINIC | Age: 85
End: 2020-02-01
Payer: COMMERCIAL

## 2020-02-01 VITALS
HEART RATE: 55 BPM | SYSTOLIC BLOOD PRESSURE: 124 MMHG | BODY MASS INDEX: 27 KG/M2 | HEIGHT: 61 IN | OXYGEN SATURATION: 99 % | RESPIRATION RATE: 14 BRPM | TEMPERATURE: 97 F | WEIGHT: 143 LBS | DIASTOLIC BLOOD PRESSURE: 68 MMHG

## 2020-02-01 DIAGNOSIS — I48.0 PAROXYSMAL ATRIAL FIBRILLATION (HCC): ICD-10-CM

## 2020-02-01 DIAGNOSIS — D51.8 MACROCYTIC ANEMIA WITH VITAMIN B12 DEFICIENCY: ICD-10-CM

## 2020-02-01 DIAGNOSIS — Z86.73 H/O ISCHEMIC RIGHT PCA STROKE: ICD-10-CM

## 2020-02-01 DIAGNOSIS — I10 ESSENTIAL HYPERTENSION: ICD-10-CM

## 2020-02-01 DIAGNOSIS — E04.2 MULTINODULAR THYROID: Primary | ICD-10-CM

## 2020-02-01 DIAGNOSIS — E78.00 HYPERCHOLESTEROLEMIA: ICD-10-CM

## 2020-02-01 DIAGNOSIS — L82.1 SEBORRHEIC KERATOSIS: ICD-10-CM

## 2020-02-01 PROCEDURE — 99213 OFFICE O/P EST LOW 20 MIN: CPT | Performed by: FAMILY MEDICINE

## 2020-02-01 RX ORDER — MOMETASONE FUROATE 1 MG/G
1 CREAM TOPICAL 2 TIMES DAILY PRN
Qty: 15 G | Refills: 0 | Status: SHIPPED | OUTPATIENT
Start: 2020-02-01 | End: 2020-06-26

## 2020-02-01 NOTE — PROGRESS NOTES
Thomas Blair IS A 82year old female [address] [place] Patient presents with: Follow - Up: US       History of present illness:     Pt had carotid doppler with Dr. Heather Vilal.  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 15 g 0   • Labetalol HCl 300 MG Oral Tab Take 1 tablet (300 mg total) by mouth 2 (two) times daily.  180 tablet 1   • losartan 100 M insecurity:        Worry: Not on file        Inability: Not on file      Transportation needs:        Medical: Not on file        Non-medical: Not on file    Tobacco Use      Smoking status: Former Smoker        Packs/day: 0.50        Years: 40.00        P inflamed seb K left back    Exam:     /68   Pulse 55   Temp 97.2 °F (36.2 °C) (Temporal)   Resp 14   Ht 61\"   Wt 143 lb (64.9 kg)   SpO2 99%   BMI 27.02 kg/m²      Thyroid soft nodules right > left normal size, low lying in neck  Lungs clear   heart

## 2020-02-01 NOTE — PATIENT INSTRUCTIONS
Schedule full physical for review of problems and meds in 1-2 months. APril is fine    Thyroid ultrasound, and blood test for thyroid hormone and some other routine tests (CBC, CMP, lipid)    Will call with ultrasound results.      Refer Dr. Crystal Luque for irrit

## 2020-02-04 ENCOUNTER — APPOINTMENT (OUTPATIENT)
Dept: LAB | Age: 85
End: 2020-02-04
Attending: FAMILY MEDICINE
Payer: MEDICARE

## 2020-02-04 LAB
ALBUMIN SERPL-MCNC: 3.4 G/DL (ref 3.4–5)
ALBUMIN/GLOB SERPL: 0.7 {RATIO} (ref 1–2)
ALP LIVER SERPL-CCNC: 123 U/L (ref 55–142)
ALT SERPL-CCNC: 25 U/L (ref 13–56)
ANION GAP SERPL CALC-SCNC: 7 MMOL/L (ref 0–18)
AST SERPL-CCNC: 36 U/L (ref 15–37)
BASOPHILS # BLD AUTO: 0.04 X10(3) UL (ref 0–0.2)
BASOPHILS NFR BLD AUTO: 0.6 %
BILIRUB SERPL-MCNC: 1.1 MG/DL (ref 0.1–2)
BUN BLD-MCNC: 11 MG/DL (ref 7–18)
BUN/CREAT SERPL: 12 (ref 10–20)
CALCIUM BLD-MCNC: 9.5 MG/DL (ref 8.5–10.1)
CHLORIDE SERPL-SCNC: 104 MMOL/L (ref 98–112)
CHOLEST SMN-MCNC: 159 MG/DL (ref ?–200)
CO2 SERPL-SCNC: 25 MMOL/L (ref 21–32)
CREAT BLD-MCNC: 0.92 MG/DL (ref 0.55–1.02)
DEPRECATED RDW RBC AUTO: 45.9 FL (ref 35.1–46.3)
EOSINOPHIL # BLD AUTO: 0.06 X10(3) UL (ref 0–0.7)
EOSINOPHIL NFR BLD AUTO: 0.9 %
ERYTHROCYTE [DISTWIDTH] IN BLOOD BY AUTOMATED COUNT: 12.2 % (ref 11–15)
GLOBULIN PLAS-MCNC: 5 G/DL (ref 2.8–4.4)
GLUCOSE BLD-MCNC: 86 MG/DL (ref 70–99)
HCT VFR BLD AUTO: 37 % (ref 35–48)
HDLC SERPL-MCNC: 88 MG/DL (ref 40–59)
HGB BLD-MCNC: 12.2 G/DL (ref 12–16)
IMM GRANULOCYTES # BLD AUTO: 0.02 X10(3) UL (ref 0–1)
IMM GRANULOCYTES NFR BLD: 0.3 %
LDLC SERPL CALC-MCNC: 61 MG/DL (ref ?–100)
LYMPHOCYTES # BLD AUTO: 0.97 X10(3) UL (ref 1–4)
LYMPHOCYTES NFR BLD AUTO: 14.9 %
M PROTEIN MFR SERPL ELPH: 8.4 G/DL (ref 6.4–8.2)
MCH RBC QN AUTO: 34 PG (ref 26–34)
MCHC RBC AUTO-ENTMCNC: 33 G/DL (ref 31–37)
MCV RBC AUTO: 103.1 FL (ref 80–100)
MONOCYTES # BLD AUTO: 0.68 X10(3) UL (ref 0.1–1)
MONOCYTES NFR BLD AUTO: 10.5 %
NEUTROPHILS # BLD AUTO: 4.72 X10 (3) UL (ref 1.5–7.7)
NEUTROPHILS # BLD AUTO: 4.72 X10(3) UL (ref 1.5–7.7)
NEUTROPHILS NFR BLD AUTO: 72.8 %
NONHDLC SERPL-MCNC: 71 MG/DL (ref ?–130)
OSMOLALITY SERPL CALC.SUM OF ELEC: 281 MOSM/KG (ref 275–295)
PATIENT FASTING Y/N/NP: YES
PATIENT FASTING Y/N/NP: YES
PLATELET # BLD AUTO: 296 10(3)UL (ref 150–450)
POTASSIUM SERPL-SCNC: 4.7 MMOL/L (ref 3.5–5.1)
RBC # BLD AUTO: 3.59 X10(6)UL (ref 3.8–5.3)
SODIUM SERPL-SCNC: 136 MMOL/L (ref 136–145)
TRIGL SERPL-MCNC: 51 MG/DL (ref 30–149)
TSI SER-ACNC: 1.49 MIU/ML (ref 0.36–3.74)
VLDLC SERPL CALC-MCNC: 10 MG/DL (ref 0–30)
WBC # BLD AUTO: 6.5 X10(3) UL (ref 4–11)

## 2020-02-04 PROCEDURE — 85025 COMPLETE CBC W/AUTO DIFF WBC: CPT | Performed by: FAMILY MEDICINE

## 2020-02-04 PROCEDURE — 84443 ASSAY THYROID STIM HORMONE: CPT | Performed by: FAMILY MEDICINE

## 2020-02-04 PROCEDURE — 80053 COMPREHEN METABOLIC PANEL: CPT | Performed by: FAMILY MEDICINE

## 2020-02-04 PROCEDURE — 80061 LIPID PANEL: CPT | Performed by: FAMILY MEDICINE

## 2020-02-04 PROCEDURE — 36415 COLL VENOUS BLD VENIPUNCTURE: CPT | Performed by: FAMILY MEDICINE

## 2020-03-02 ENCOUNTER — OFFICE VISIT (OUTPATIENT)
Dept: FAMILY MEDICINE CLINIC | Facility: CLINIC | Age: 85
End: 2020-03-02
Payer: COMMERCIAL

## 2020-03-02 VITALS
WEIGHT: 143 LBS | HEIGHT: 61 IN | OXYGEN SATURATION: 97 % | BODY MASS INDEX: 27 KG/M2 | SYSTOLIC BLOOD PRESSURE: 170 MMHG | DIASTOLIC BLOOD PRESSURE: 68 MMHG | TEMPERATURE: 97 F | HEART RATE: 67 BPM | RESPIRATION RATE: 16 BRPM

## 2020-03-02 DIAGNOSIS — I65.21 RIGHT CAROTID ARTERY OCCLUSION: ICD-10-CM

## 2020-03-02 DIAGNOSIS — E87.1 HYPONATREMIA: Primary | ICD-10-CM

## 2020-03-02 DIAGNOSIS — H53.8 BLURRED VISION: ICD-10-CM

## 2020-03-02 DIAGNOSIS — I10 ESSENTIAL HYPERTENSION: ICD-10-CM

## 2020-03-02 DIAGNOSIS — Z86.73 HISTORY OF CARDIOEMBOLIC CEREBROVASCULAR ACCIDENT (CVA): ICD-10-CM

## 2020-03-02 DIAGNOSIS — I65.22 INTERNAL CAROTID ARTERY STENOSIS, LEFT: ICD-10-CM

## 2020-03-02 DIAGNOSIS — R55 SYNCOPE, UNSPECIFIED SYNCOPE TYPE: ICD-10-CM

## 2020-03-02 PROCEDURE — 1111F DSCHRG MED/CURRENT MED MERGE: CPT | Performed by: FAMILY MEDICINE

## 2020-03-02 PROCEDURE — 99214 OFFICE O/P EST MOD 30 MIN: CPT | Performed by: FAMILY MEDICINE

## 2020-03-02 RX ORDER — HYDRALAZINE HYDROCHLORIDE 25 MG/1
25 TABLET, FILM COATED ORAL
COMMUNITY
Start: 2020-02-24 | End: 2020-03-16

## 2020-03-02 RX ORDER — FUROSEMIDE 20 MG/1
20 TABLET ORAL DAILY PRN
Qty: 90 TABLET | Refills: 1 | Status: SHIPPED | OUTPATIENT
Start: 2020-03-02 | End: 2020-06-26

## 2020-03-02 RX ORDER — HYDRALAZINE HYDROCHLORIDE 25 MG/1
25 TABLET, FILM COATED ORAL 3 TIMES DAILY
Qty: 60 TABLET | Refills: 1 | Status: SHIPPED | OUTPATIENT
Start: 2020-03-02 | End: 2020-03-16

## 2020-03-02 NOTE — PROGRESS NOTES
Eugene Covington IS A 82year old female [address] [facility] Patient presents with:  Hospital F/U       History of present illness:     Pt was at [facility] 2/23-2/24 mid day, overnight. Had syncope at 11:00 service at [facility], on her birthday!  was standing singing for [employer] • Unspecified essential hypertension     office BP runs higher than home.        Past Surgical History:   Procedure Laterality Date   • BREAST BIOPSY NEEDLE LOCALIZATION Left 11/9/2017    Performed by Uma Hernandez MD at [facility] MAIN OR   • [address] Coughing  Adhesive Tape           ITCHING    Comment:redness  Amlodipine              SWELLING    Comment:Swelling of ankles  Clonidine               DIZZINESS  Furosemide              OTHER (SEE COMMENTS)    Comment:Severe hyponatremia requiring hospitali partner: Not on file        Emotionally abused: Not on file        Physically abused: Not on file        Forced sexual activity: Not on file    Other Topics      Concerns:         Service: Not Asked        Blood Transfusions: Yes          14 yr ago artery stenosis, left    Blurred vision  -     OPHTHALMOLOGY - INTERNAL    Other orders  -     furosemide 20 MG Oral Tab; Take 1 tablet (20 mg total) by mouth daily as needed. -     hydrALAzine HCl 25 MG Oral Tab;  Take 1 tablet (25 mg total) by mouth 3 (t

## 2020-03-03 ENCOUNTER — APPOINTMENT (OUTPATIENT)
Dept: LAB | Age: 85
End: 2020-03-03
Attending: FAMILY MEDICINE
Payer: MEDICARE

## 2020-03-03 LAB
ANION GAP SERPL CALC-SCNC: 5 MMOL/L (ref 0–18)
BUN BLD-MCNC: 9 MG/DL (ref 7–18)
BUN/CREAT SERPL: 9.8 (ref 10–20)
CALCIUM BLD-MCNC: 9 MG/DL (ref 8.5–10.1)
CHLORIDE SERPL-SCNC: 105 MMOL/L (ref 98–112)
CO2 SERPL-SCNC: 26 MMOL/L (ref 21–32)
CREAT BLD-MCNC: 0.92 MG/DL (ref 0.55–1.02)
GLUCOSE BLD-MCNC: 77 MG/DL (ref 70–99)
OSMOLALITY SERPL CALC.SUM OF ELEC: 279 MOSM/KG (ref 275–295)
PATIENT FASTING Y/N/NP: NO
POTASSIUM SERPL-SCNC: 4.2 MMOL/L (ref 3.5–5.1)
SODIUM SERPL-SCNC: 136 MMOL/L (ref 136–145)

## 2020-03-03 PROCEDURE — 36415 COLL VENOUS BLD VENIPUNCTURE: CPT | Performed by: FAMILY MEDICINE

## 2020-03-03 PROCEDURE — 80048 BASIC METABOLIC PNL TOTAL CA: CPT | Performed by: FAMILY MEDICINE

## 2020-03-12 ENCOUNTER — LAB ENCOUNTER (OUTPATIENT)
Dept: LAB | Age: 85
End: 2020-03-12
Attending: FAMILY MEDICINE
Payer: MEDICARE

## 2020-03-12 DIAGNOSIS — R77.1 ELEVATED SERUM GLOBULIN LEVEL: ICD-10-CM

## 2020-03-12 DIAGNOSIS — R77.9 ELEVATED BLOOD PROTEIN: ICD-10-CM

## 2020-03-12 PROCEDURE — 83883 ASSAY NEPHELOMETRY NOT SPEC: CPT

## 2020-03-12 PROCEDURE — 84165 PROTEIN E-PHORESIS SERUM: CPT

## 2020-03-12 PROCEDURE — 86334 IMMUNOFIX E-PHORESIS SERUM: CPT

## 2020-03-12 PROCEDURE — 36415 COLL VENOUS BLD VENIPUNCTURE: CPT

## 2020-03-13 LAB
KAPPA FREE LIGHT CHAIN: 5.09 MG/DL (ref 0.33–1.94)
KAPPA/LAMBDA FLC RATIO: 1.75 (ref 0.26–1.65)
LAMBDA FREE LIGHT CHAIN: 2.9 MG/DL (ref 0.57–2.63)

## 2020-03-16 ENCOUNTER — OFFICE VISIT (OUTPATIENT)
Dept: FAMILY MEDICINE CLINIC | Facility: CLINIC | Age: 85
End: 2020-03-16
Payer: COMMERCIAL

## 2020-03-16 VITALS
HEART RATE: 59 BPM | TEMPERATURE: 99 F | HEIGHT: 61 IN | BODY MASS INDEX: 27.38 KG/M2 | DIASTOLIC BLOOD PRESSURE: 60 MMHG | RESPIRATION RATE: 16 BRPM | SYSTOLIC BLOOD PRESSURE: 130 MMHG | OXYGEN SATURATION: 98 % | WEIGHT: 145 LBS

## 2020-03-16 DIAGNOSIS — E87.1 HYPONATREMIA: Primary | ICD-10-CM

## 2020-03-16 DIAGNOSIS — I10 ESSENTIAL HYPERTENSION: ICD-10-CM

## 2020-03-16 PROCEDURE — 99213 OFFICE O/P EST LOW 20 MIN: CPT | Performed by: FAMILY MEDICINE

## 2020-03-16 RX ORDER — LABETALOL 300 MG/1
300 TABLET, FILM COATED ORAL 2 TIMES DAILY
Qty: 180 TABLET | Refills: 1 | Status: SHIPPED | OUTPATIENT
Start: 2020-03-16 | End: 2020-06-26

## 2020-03-16 RX ORDER — LOSARTAN POTASSIUM 100 MG/1
100 TABLET ORAL DAILY
Qty: 90 TABLET | Refills: 1 | Status: SHIPPED | OUTPATIENT
Start: 2020-03-16 | End: 2020-06-26

## 2020-03-16 RX ORDER — HYDRALAZINE HYDROCHLORIDE 25 MG/1
25 TABLET, FILM COATED ORAL 2 TIMES DAILY
Qty: 180 TABLET | Refills: 1 | Status: SHIPPED | OUTPATIENT
Start: 2020-03-16 | End: 2020-06-26

## 2020-03-16 NOTE — PROGRESS NOTES
Eric Bush IS A 82year old female [address] [place] Patient presents with: Follow - Up: medication       History of present illness:     BP at home fluctuates. Highest 165, taking furosemide mid-morning. No incontinence.  Bad news is she went to [facility], took romaine • losartan 100 MG Oral Tab Take 1 tablet (100 mg total) by mouth daily. 90 tablet 1   • Labetalol HCl 300 MG Oral Tab Take 1 tablet (300 mg total) by mouth 2 (two) times daily.  180 tablet 1   • furosemide 20 MG Oral Tab Take 1 tablet (20 mg total) by chitra Transportation needs:        Medical: Not on file        Non-medical: Not on file    Tobacco Use      Smoking status: Former Smoker        Packs/day: 0.50        Years: 40.00        Pack years: 21        Quit date: 1995        Years since quittin. Persists with swelling in left leg > right. Starts with as soon as she gets on her feet during day.  No dyspnea or chest discomfort  Exam:     /60   Pulse 59   Temp 98.5 °F (36.9 °C) (Temporal)   Resp 16   Ht 61\"   Wt 145 lb (65.8 kg)   SpO2 98%

## 2020-03-16 NOTE — PATIENT INSTRUCTIONS
Will call you in 1-2 days with final report on protein level and the added basic metabolic panel (sodium, potassium and kidney function). Otherwise blood pressure is better.  Continue the furosemide, try switching to first thing in AM.     Get June Paris

## 2020-03-17 LAB
ALBUMIN SERPL ELPH-MCNC: 3.8 G/DL (ref 3.75–5.21)
ALBUMIN/GLOB SERPL: 1.06 {RATIO} (ref 1–2)
ALPHA1 GLOB SERPL ELPH-MCNC: 0.31 G/DL (ref 0.19–0.46)
ALPHA2 GLOB SERPL ELPH-MCNC: 0.69 G/DL (ref 0.48–1.05)
B-GLOBULIN SERPL ELPH-MCNC: 0.75 G/DL (ref 0.68–1.23)
GAMMA GLOB SERPL ELPH-MCNC: 1.85 G/DL (ref 0.62–1.7)
M PROTEIN MFR SERPL ELPH: 7.4 G/DL (ref 6.4–8.2)

## 2020-06-26 ENCOUNTER — OFFICE VISIT (OUTPATIENT)
Dept: FAMILY MEDICINE CLINIC | Facility: CLINIC | Age: 85
End: 2020-06-26
Payer: COMMERCIAL

## 2020-06-26 VITALS
TEMPERATURE: 97 F | BODY MASS INDEX: 26.81 KG/M2 | RESPIRATION RATE: 16 BRPM | WEIGHT: 142 LBS | DIASTOLIC BLOOD PRESSURE: 58 MMHG | HEART RATE: 63 BPM | OXYGEN SATURATION: 98 % | SYSTOLIC BLOOD PRESSURE: 124 MMHG | HEIGHT: 61 IN

## 2020-06-26 DIAGNOSIS — E78.00 PURE HYPERCHOLESTEROLEMIA: ICD-10-CM

## 2020-06-26 DIAGNOSIS — I10 ESSENTIAL HYPERTENSION: ICD-10-CM

## 2020-06-26 DIAGNOSIS — R60.9 PERIPHERAL EDEMA: ICD-10-CM

## 2020-06-26 DIAGNOSIS — I48.0 PAROXYSMAL ATRIAL FIBRILLATION (HCC): ICD-10-CM

## 2020-06-26 DIAGNOSIS — I27.20 PULMONARY HYPERTENSION, MODERATE TO SEVERE (HCC): ICD-10-CM

## 2020-06-26 DIAGNOSIS — I69.398 VISUAL FIELD DEFECT DUE TO AND NOT CONCURRENT WITH CEREBROVASCULAR ACCIDENT (CVA): ICD-10-CM

## 2020-06-26 DIAGNOSIS — I70.0 THORACIC AORTA ATHEROSCLEROSIS (HCC): ICD-10-CM

## 2020-06-26 DIAGNOSIS — L82.0 SEBORRHEIC KERATOSES, INFLAMED: ICD-10-CM

## 2020-06-26 DIAGNOSIS — J44.9 ASTHMA WITH COPD (CHRONIC OBSTRUCTIVE PULMONARY DISEASE) (HCC): ICD-10-CM

## 2020-06-26 DIAGNOSIS — D51.8 MACROCYTIC ANEMIA WITH VITAMIN B12 DEFICIENCY: ICD-10-CM

## 2020-06-26 DIAGNOSIS — Z79.01 CHRONIC ANTICOAGULATION: ICD-10-CM

## 2020-06-26 DIAGNOSIS — N60.92 ATYPICAL DUCTAL HYPERPLASIA OF LEFT BREAST: ICD-10-CM

## 2020-06-26 DIAGNOSIS — Z86.73 H/O ISCHEMIC RIGHT PCA STROKE: ICD-10-CM

## 2020-06-26 DIAGNOSIS — I69.30 CHRONIC ISCHEMIC MULTIFOCAL MULTIPLE VASCULAR TERRITORIES STROKE: ICD-10-CM

## 2020-06-26 DIAGNOSIS — L93.0 DISCOID LUPUS: ICD-10-CM

## 2020-06-26 DIAGNOSIS — Z00.00 ENCOUNTER FOR ANNUAL HEALTH EXAMINATION: Primary | ICD-10-CM

## 2020-06-26 DIAGNOSIS — Z51.81 MEDICATION MONITORING ENCOUNTER: ICD-10-CM

## 2020-06-26 DIAGNOSIS — H53.452 DECREASED PERIPHERAL VISION OF LEFT EYE: ICD-10-CM

## 2020-06-26 DIAGNOSIS — M17.12 LOCALIZED OSTEOARTHRITIS OF LEFT KNEE: ICD-10-CM

## 2020-06-26 DIAGNOSIS — H25.9 AGE-RELATED CATARACT OF BOTH EYES, UNSPECIFIED AGE-RELATED CATARACT TYPE: ICD-10-CM

## 2020-06-26 DIAGNOSIS — H53.40 VISUAL FIELD DEFECT DUE TO AND NOT CONCURRENT WITH CEREBROVASCULAR ACCIDENT (CVA): ICD-10-CM

## 2020-06-26 DIAGNOSIS — R76.8 POSITIVE ANA (ANTINUCLEAR ANTIBODY): ICD-10-CM

## 2020-06-26 DIAGNOSIS — M81.0 AGE-RELATED OSTEOPOROSIS WITHOUT CURRENT PATHOLOGICAL FRACTURE: ICD-10-CM

## 2020-06-26 DIAGNOSIS — I27.20 PULMONARY HYPERTENSION (HCC): ICD-10-CM

## 2020-06-26 DIAGNOSIS — Z12.31 ENCOUNTER FOR SCREENING MAMMOGRAM FOR BREAST CANCER: ICD-10-CM

## 2020-06-26 DIAGNOSIS — I65.21 OCCLUSION OF RIGHT CAROTID ARTERY: ICD-10-CM

## 2020-06-26 DIAGNOSIS — Z96.641 STATUS POST TOTAL REPLACEMENT OF RIGHT HIP: ICD-10-CM

## 2020-06-26 PROBLEM — H53.459 DECREASED PERIPHERAL VISION: Status: ACTIVE | Noted: 2020-06-26

## 2020-06-26 PROBLEM — Z87.19 H/O UMBILICAL HERNIA REPAIR: Status: RESOLVED | Noted: 2017-04-14 | Resolved: 2020-06-26

## 2020-06-26 PROBLEM — Z98.890 H/O UMBILICAL HERNIA REPAIR: Status: RESOLVED | Noted: 2017-04-14 | Resolved: 2020-06-26

## 2020-06-26 PROBLEM — R26.2 DIFFICULTY WALKING: Status: RESOLVED | Noted: 2018-02-12 | Resolved: 2020-06-26

## 2020-06-26 PROBLEM — Z85.42 HISTORY OF ENDOMETRIAL CANCER: Status: RESOLVED | Noted: 2017-04-19 | Resolved: 2020-06-26

## 2020-06-26 PROCEDURE — G0439 PPPS, SUBSEQ VISIT: HCPCS | Performed by: FAMILY MEDICINE

## 2020-06-26 PROCEDURE — 96160 PT-FOCUSED HLTH RISK ASSMT: CPT | Performed by: FAMILY MEDICINE

## 2020-06-26 PROCEDURE — 99397 PER PM REEVAL EST PAT 65+ YR: CPT | Performed by: FAMILY MEDICINE

## 2020-06-26 RX ORDER — LABETALOL 300 MG/1
300 TABLET, FILM COATED ORAL 2 TIMES DAILY
Qty: 180 TABLET | Refills: 1 | Status: SHIPPED | OUTPATIENT
Start: 2020-06-26 | End: 2021-03-29

## 2020-06-26 RX ORDER — ALBUTEROL SULFATE 90 UG/1
2 AEROSOL, METERED RESPIRATORY (INHALATION) EVERY 6 HOURS PRN
Qty: 1 INHALER | Refills: 3 | Status: SHIPPED | OUTPATIENT
Start: 2020-06-26 | End: 2020-10-06 | Stop reason: ALTCHOICE

## 2020-06-26 RX ORDER — MOMETASONE FUROATE 1 MG/G
1 CREAM TOPICAL 2 TIMES DAILY PRN
Qty: 15 G | Refills: 0 | Status: SHIPPED | OUTPATIENT
Start: 2020-06-26

## 2020-06-26 RX ORDER — FUROSEMIDE 20 MG/1
20 TABLET ORAL DAILY PRN
Qty: 90 TABLET | Refills: 1 | Status: SHIPPED | OUTPATIENT
Start: 2020-06-26 | End: 2021-03-29

## 2020-06-26 RX ORDER — ATORVASTATIN CALCIUM 40 MG/1
TABLET, FILM COATED ORAL
Qty: 90 TABLET | Refills: 1 | Status: SHIPPED | OUTPATIENT
Start: 2020-06-26 | End: 2021-07-28

## 2020-06-26 RX ORDER — LOSARTAN POTASSIUM 100 MG/1
100 TABLET ORAL DAILY
Qty: 90 TABLET | Refills: 1 | Status: SHIPPED | OUTPATIENT
Start: 2020-06-26 | End: 2021-03-29

## 2020-06-26 RX ORDER — HYDRALAZINE HYDROCHLORIDE 25 MG/1
25 TABLET, FILM COATED ORAL 2 TIMES DAILY
Qty: 180 TABLET | Refills: 1 | Status: SHIPPED | OUTPATIENT
Start: 2020-06-26 | End: 2021-03-24

## 2020-06-26 NOTE — PATIENT INSTRUCTIONS
Albuterol 2 puffs before activity or as needed for shortness of breath.      Mometasone cream.     James Ramírez's SCREENING SCHEDULE   Tests on this list are recommended by your physician but may not be covered, or covered at this frequency, by your insure years old and have smoked more than 100 cigarettes in their lifetime   • Anyone with a family history    Colorectal Cancer Screening  Covered up to Age 76     Colonoscopy Screen   Covered every 10 years- more often if abnormal There are no preventive care Influenza  Covered Annually Orders placed or performed in visit on 10/07/19   • FLU VACC HIGH DOSE PRSV FREE   Orders placed or performed in visit on 11/04/13   • FLU VACC PRSV FREE INC ANTIG    Please get every year    Pneumococcal 13 (Prevnar)  Covered O information from the [address] [place] [address] regarding Advance Directives.

## 2020-06-26 NOTE — PROGRESS NOTES
HPI:   Elena Poe is a 82year old female who presents for a MA (Medicare Advantage) [facility] (Once per calendar year). Here for well visit.   Her last annual assessment has been over 1 year: Annual Physical due on 04/04/2020         Fall/Risk Ass Team:  Sue Hernandez MD as PCP - General (Family Practice)  Yamil Mullen MD ([id number] [facility])  Wilman Arroyo DO as Consulting Physician (NEUROLOGY)    Patient Active Problem List:     Cataract no hx surgery. Had test from ravi Orozco.  Got l on exertion,   Wt Readings from Last 3 Encounters:  06/26/20 : 142 lb (64.4 kg)  03/16/20 : 145 lb (65.8 kg)  03/02/20 : 143 lb (64.9 kg)     Last Cholesterol Labs:   Lab Results   Component Value Date    CHOLEST 159 02/04/2020    HDL 88 (H) 02/04/2020 (4/19/2017), Osteoarthritis of hip (11/09), Pneumonia due to organism, Stroke [facility]) (01/2017), Tibial fracture, and Unspecified essential hypertension.     She  has a past surgical history that includes Breast lumpectomy (Left, 11/2009); hysterectomy (1993) Neck: Supple, symmetrical, trachea midline, no adenopathy;  thyroid: not enlarged, symmetric, no tenderness/mass/nodules; no carotid bruit or JVD   Back:   Symmetric, no curvature, ROM normal, no CVA tenderness   Lungs:   Clear to auscultation bilaterall problems. stable    Asthma with COPD (chronic obstructive pulmonary disease) (HCC) mild worsening intermittently, has some dyspnea, will refill an albuterol inhaler, she hasn't had one for a few years.     Pulmonary hypertension ([facility]- some improvement from 2 - No  Has your appetite been poor?: No  How does the patient maintain a good energy level?: Appropriate Exercise  How would you describe your daily physical activity?: Moderate  How would you describe your current health state?: Fair  How do you maintain found.    Screening Mammogram      Mammogram Annually to 76, then as discussed There are no preventive care reminders to display for this patient.  Update Health Maintenance if applicable     Immunizations (Update Immunization Activity if applicable)     In ANNUAL ASSESSMENT FEMALE [25532]    Patient Instructions     Albuterol 2 puffs before activity or as needed for shortness of breath.      Mometasone cream.     James Ramírez's SCREENING SCHEDULE   Tests on this list are recommended by your physician but may meet one of the following criteria:   • Men who are 73-68 years old and have smoked more than 100 cigarettes in their lifetime   • Anyone with a family history    Colorectal Cancer Screening  Covered up to Age 76     Colonoscopy Screen   Covered every 10 y Please get this Mammogram regularly   Immunizations      Influenza  Covered Annually Orders placed or performed in visit on 10/07/19   • FLU VACC HIGH DOSE PRSV FREE   Orders placed or performed in visit on 11/04/13   • FLU VACC PRSV FREE INC ANTIG    [employer] [place])  www. [email or web address]. org  This link also has information from the [address] [place] [address] regarding Advance Directives.

## 2020-06-30 ENCOUNTER — TELEPHONE (OUTPATIENT)
Dept: FAMILY MEDICINE CLINIC | Facility: CLINIC | Age: 85
End: 2020-06-30

## 2020-06-30 DIAGNOSIS — H54.7 VISUAL FIELD LOSS FOLLOWING CEREBROVASCULAR ACCIDENT (CVA): Primary | ICD-10-CM

## 2020-06-30 DIAGNOSIS — I65.21 RIGHT CAROTID ARTERY OCCLUSION: ICD-10-CM

## 2020-06-30 DIAGNOSIS — I69.398 VISUAL FIELD LOSS FOLLOWING CEREBROVASCULAR ACCIDENT (CVA): Primary | ICD-10-CM

## 2020-06-30 NOTE — TELEPHONE ENCOUNTER
Checked [facility]  rehab website, they require signed rx for OT:  rehab evaluation & treatment. [facility].. [email or web address]. aspx

## 2020-06-30 NOTE — TELEPHONE ENCOUNTER
----- Message from Phoebe Case MD sent at 6/26/2020  8:55 PM CDT -----  Regarding: check on vision rehab and coverage for pt

## 2020-07-10 NOTE — TELEPHONE ENCOUNTER
Order for  Rehabilitation Program at  [facility] faxed to [phone]. Confirmation received. Called patient on mobile number and VMML (no identification on home number).  Advised order has been sent for the  Reha

## 2020-07-10 NOTE — TELEPHONE ENCOUNTER
Dr. Dory Torres,  Please advise    Has the referral order been sent to [facility]? Does the patient know referral has been written for her. I will do this if not done already.

## 2020-07-10 NOTE — TELEPHONE ENCOUNTER
Print and send, I didn't personally send the referral. Please fax. [employer].. [email or web address]. aspx                       Notify pt we are doing this.

## 2020-08-06 ENCOUNTER — HOSPITAL ENCOUNTER (OUTPATIENT)
Dept: MAMMOGRAPHY | Age: 85
Discharge: HOME | End: 2020-08-06
Attending: FAMILY MEDICINE
Payer: MEDICARE

## 2020-08-06 DIAGNOSIS — Z12.31 ENCOUNTER FOR SCREENING MAMMOGRAM FOR BREAST CANCER: ICD-10-CM

## 2020-08-06 DIAGNOSIS — N60.92 ATYPICAL DUCTAL HYPERPLASIA OF LEFT BREAST: ICD-10-CM

## 2020-08-06 PROCEDURE — 77063 BREAST TOMOSYNTHESIS BI: CPT | Performed by: FAMILY MEDICINE

## 2020-08-06 PROCEDURE — 77067 SCR MAMMO BI INCL CAD: CPT | Performed by: FAMILY MEDICINE

## 2020-08-17 ENCOUNTER — TELEPHONE (OUTPATIENT)
Dept: FAMILY MEDICINE CLINIC | Facility: CLINIC | Age: 85
End: 2020-08-17

## 2020-08-17 NOTE — TELEPHONE ENCOUNTER
Pt called asking to speak to nurse:      Has a irritation ons on the left nipple. Was using Peroxide & Vaseline on it. Got better but got irritated again when from the stickers they  use when getting the mammogram done.       She purchased a breast cream

## 2020-08-17 NOTE — TELEPHONE ENCOUNTER
Detailed VMML for patient. Advised to use breast cream or a lanolin based cream to irritated area on breast. Do not use hydrogen peroxide and Vaseline.   Advised to return call if there is any drainage in the area, if she's running a fever or if skin irrit

## 2020-08-18 ENCOUNTER — APPOINTMENT (OUTPATIENT)
Dept: LAB | Age: 85
End: 2020-08-18
Attending: FAMILY MEDICINE
Payer: MEDICARE

## 2020-08-18 LAB
ALBUMIN SERPL-MCNC: 3.3 G/DL (ref 3.4–5)
ALBUMIN/GLOB SERPL: 0.7 {RATIO} (ref 1–2)
ALP LIVER SERPL-CCNC: 99 U/L (ref 55–142)
ALT SERPL-CCNC: 19 U/L (ref 13–56)
ANION GAP SERPL CALC-SCNC: 4 MMOL/L (ref 0–18)
AST SERPL-CCNC: 29 U/L (ref 15–37)
BILIRUB SERPL-MCNC: 1.3 MG/DL (ref 0.1–2)
BUN BLD-MCNC: 10 MG/DL (ref 7–18)
BUN/CREAT SERPL: 11 (ref 10–20)
CALCIUM BLD-MCNC: 8.5 MG/DL (ref 8.5–10.1)
CHLORIDE SERPL-SCNC: 107 MMOL/L (ref 98–112)
CO2 SERPL-SCNC: 25 MMOL/L (ref 21–32)
CREAT BLD-MCNC: 0.91 MG/DL (ref 0.55–1.02)
GLOBULIN PLAS-MCNC: 4.6 G/DL (ref 2.8–4.4)
GLUCOSE BLD-MCNC: 93 MG/DL (ref 70–99)
M PROTEIN MFR SERPL ELPH: 7.9 G/DL (ref 6.4–8.2)
OSMOLALITY SERPL CALC.SUM OF ELEC: 281 MOSM/KG (ref 275–295)
PATIENT FASTING Y/N/NP: NO
POTASSIUM SERPL-SCNC: 3.9 MMOL/L (ref 3.5–5.1)
SODIUM SERPL-SCNC: 136 MMOL/L (ref 136–145)
VIT B12 SERPL-MCNC: 437 PG/ML (ref 193–986)

## 2020-08-18 PROCEDURE — 82607 VITAMIN B-12: CPT | Performed by: FAMILY MEDICINE

## 2020-08-18 PROCEDURE — 36415 COLL VENOUS BLD VENIPUNCTURE: CPT | Performed by: FAMILY MEDICINE

## 2020-08-18 PROCEDURE — 80053 COMPREHEN METABOLIC PANEL: CPT | Performed by: FAMILY MEDICINE

## 2020-09-04 ENCOUNTER — OFFICE VISIT (OUTPATIENT)
Dept: FAMILY MEDICINE CLINIC | Facility: CLINIC | Age: 85
End: 2020-09-04
Payer: COMMERCIAL

## 2020-09-04 VITALS
TEMPERATURE: 98 F | BODY MASS INDEX: 27 KG/M2 | SYSTOLIC BLOOD PRESSURE: 164 MMHG | RESPIRATION RATE: 16 BRPM | OXYGEN SATURATION: 99 % | DIASTOLIC BLOOD PRESSURE: 82 MMHG | HEART RATE: 62 BPM | HEIGHT: 61 IN | WEIGHT: 143 LBS

## 2020-09-04 DIAGNOSIS — N60.92 ATYPICAL DUCTAL HYPERPLASIA OF LEFT BREAST: ICD-10-CM

## 2020-09-04 DIAGNOSIS — N64.9 LESION OF LEFT NIPPLE: Primary | ICD-10-CM

## 2020-09-04 DIAGNOSIS — Z23 NEED FOR VACCINATION: ICD-10-CM

## 2020-09-04 PROBLEM — I65.21 STENOSIS OF RIGHT CAROTID ARTERY: Status: ACTIVE | Noted: 2020-02-23

## 2020-09-04 PROCEDURE — 99213 OFFICE O/P EST LOW 20 MIN: CPT | Performed by: FAMILY MEDICINE

## 2020-09-04 PROCEDURE — 90662 IIV NO PRSV INCREASED AG IM: CPT | Performed by: FAMILY MEDICINE

## 2020-09-04 PROCEDURE — 3077F SYST BP >= 140 MM HG: CPT | Performed by: FAMILY MEDICINE

## 2020-09-04 PROCEDURE — G0008 ADMIN INFLUENZA VIRUS VAC: HCPCS | Performed by: FAMILY MEDICINE

## 2020-09-04 PROCEDURE — 3079F DIAST BP 80-89 MM HG: CPT | Performed by: FAMILY MEDICINE

## 2020-09-04 PROCEDURE — 3008F BODY MASS INDEX DOCD: CPT | Performed by: FAMILY MEDICINE

## 2020-09-04 NOTE — PROGRESS NOTES
Saira Colin IS A 82year old female HERE FOR Patient presents with:  Breast Problem       History of present illness:     Has pain/irritation from a) lace bra cut into nipple, applied peroxide, improved somewhat but after b) mammogram 2 mo ago, she had p Medications   Medication Sig Dispense Refill   • Albuterol Sulfate  (90 Base) MCG/ACT Inhalation Aero Soln Inhale 2 puffs into the lungs every 6 (six) hours as needed for Wheezing.  1 Inhaler 3   • Mometasone Furoate 0.1 % External Cream Apply 1 Appl file    Occupational History      Occupation: Retired         Comment: retired 1994 from [employer]      Occupation: Taught nursery school    Social Needs      Financial resource strain: Not on file      Food insecurity:        Worry: Not Not Asked        Seat Belt: Yes        Self-Exams: Not Asked    Social History Narrative      Lives with , recent move to [facility]. No children. No pets. No [employer] affiliation      Review of systems:     No itching in nipple.  No discharge note

## 2020-09-04 NOTE — PATIENT INSTRUCTIONS
See Dr Sofia Shone for evaluation of the persistent nipple inflammation. If  She approves, hydrocortisone cream 1% over the counter might help decrease irritation and swelling of nipple.

## 2020-09-15 ENCOUNTER — TELEPHONE (OUTPATIENT)
Dept: SURGERY | Facility: CLINIC | Age: 85
End: 2020-09-15

## 2020-09-15 NOTE — TELEPHONE ENCOUNTER
Returned pt phone call regarding the cream she is using for her nipple irritation and the use of nipple pads as a barrier between the nipple and the bra. Pt states the irritation has subsided significantly.   Informed pt that both of those options to help

## 2020-10-02 ENCOUNTER — TELEPHONE (OUTPATIENT)
Dept: FAMILY MEDICINE CLINIC | Facility: CLINIC | Age: 85
End: 2020-10-02

## 2020-10-02 ENCOUNTER — OFFICE VISIT (OUTPATIENT)
Dept: SURGERY | Facility: CLINIC | Age: 85
End: 2020-10-02
Payer: COMMERCIAL

## 2020-10-02 VITALS
SYSTOLIC BLOOD PRESSURE: 219 MMHG | RESPIRATION RATE: 16 BRPM | DIASTOLIC BLOOD PRESSURE: 92 MMHG | OXYGEN SATURATION: 99 % | HEART RATE: 65 BPM

## 2020-10-02 DIAGNOSIS — N64.9 LESION OF LEFT NIPPLE: Primary | ICD-10-CM

## 2020-10-02 DIAGNOSIS — N60.92 ATYPICAL DUCTAL HYPERPLASIA OF LEFT BREAST: ICD-10-CM

## 2020-10-02 PROCEDURE — 3080F DIAST BP >= 90 MM HG: CPT | Performed by: SURGERY

## 2020-10-02 PROCEDURE — 3077F SYST BP >= 140 MM HG: CPT | Performed by: SURGERY

## 2020-10-02 PROCEDURE — 99214 OFFICE O/P EST MOD 30 MIN: CPT | Performed by: SURGERY

## 2020-10-02 NOTE — TELEPHONE ENCOUNTER
Surgeon usually has authority to indicate how long anticoagulants need to be held. Patient's Eliquis is managed by cardiology.  Typical advice for Eliquis before surgery is hold 48 hours (don't take it either of the two days before surgery) and start again

## 2020-10-02 NOTE — PROGRESS NOTES
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 , Disp: 15 g, Rfl: 0    •  hydrALAzine HCl 25 MG Oral Tab, Take 1 tablet (25 mg total) by mouth 2 (two) times daily. , Disp: 180 tablet, Rfl: 1    •  losartan 100 MG Oral Tab, Take 1 tablet (100 mg total) by mouth kerry chills, night sweats, fatigue, generalized weakness, change in appetite or weight loss. HEENT:     The patient denies eye irritation, cataracts, redness, glaucoma, yellowing of the eyes, +change in vision or color blindness.  The patient denies hearing l problems, trembling/tremors, fainting/black outs, dizziness, memory problems, loss of sensation/numbness, problems walking, weakness, tingling or burning in hands/feet.  There is no history of abusive relationship, bipolar disorder, sleep disturbance, anxie We will have her present next week for biopsy. The risks and possible complications of a left nipple punch biopsy were discussed with the patient and she agreed to proceed.  I personally reviewed her pathology which showed no additional concerning findings

## 2020-10-05 NOTE — TELEPHONE ENCOUNTER
I called the patient and she states she took dose Friday AM but has not taken any since then per what Dr. Priyank Calderon advised her. Sending this as FYI to Dr. Albertina Campos.

## 2020-10-06 ENCOUNTER — OFFICE VISIT (OUTPATIENT)
Dept: SURGERY | Facility: CLINIC | Age: 85
End: 2020-10-06
Payer: COMMERCIAL

## 2020-10-06 VITALS
DIASTOLIC BLOOD PRESSURE: 81 MMHG | HEART RATE: 60 BPM | OXYGEN SATURATION: 98 % | RESPIRATION RATE: 18 BRPM | SYSTOLIC BLOOD PRESSURE: 181 MMHG

## 2020-10-06 DIAGNOSIS — N64.9 LESION OF LEFT NIPPLE: Primary | ICD-10-CM

## 2020-10-06 DIAGNOSIS — N64.9 BREAST LESION: ICD-10-CM

## 2020-10-06 PROCEDURE — 3079F DIAST BP 80-89 MM HG: CPT | Performed by: SURGERY

## 2020-10-06 PROCEDURE — 88305 TISSUE EXAM BY PATHOLOGIST: CPT | Performed by: SURGERY

## 2020-10-06 PROCEDURE — 99214 OFFICE O/P EST MOD 30 MIN: CPT | Performed by: SURGERY

## 2020-10-06 PROCEDURE — 3077F SYST BP >= 140 MM HG: CPT | Performed by: SURGERY

## 2020-10-06 PROCEDURE — 11104 PUNCH BX SKIN SINGLE LESION: CPT | Performed by: SURGERY

## 2020-10-06 NOTE — PROGRESS NOTES
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 , Disp: 180 tablet, Rfl: 1    •  losartan 100 MG Oral Tab, Take 1 tablet (100 mg total) by mouth daily. , Disp: 90 tablet, Rfl: 1    •  Labetalol HCl 300 MG Oral Tab, Take 1 tablet (300 mg total) by mouth 2 redness, glaucoma, yellowing of the eyes, +change in vision or color blindness.  The patient denies hearing loss, ringing in the ears, ear drainage, earaches, nasal congestion, nose bleeds, snoring, pain in mouth/throat, hoarseness, change in voice, facial in hands/feet. There is no history of abusive relationship, bipolar disorder, sleep disturbance, anxiety, depression or feeling of despair. Endocrine:     There is no history of poor/slow wound healing, weight loss/gain, fertility or hormone problems, co 1% lidocaine with epinephrine was used to infiltrate the skin and subcutaneous tissues in the retroareolar area. A 2 mm punch biopsy was obtained, taken and placed in formalin and sent for pathologic evaluation.   The defect was closed with a 3-0 chromic i

## 2020-10-08 ENCOUNTER — TELEPHONE (OUTPATIENT)
Dept: SURGERY | Facility: CLINIC | Age: 85
End: 2020-10-08

## 2020-10-08 NOTE — TELEPHONE ENCOUNTER
Pt returned call in regards to her path results.   Informed pt, that per Dr. Margie Vega, \"this was benign dermatitis.  We will have her hold on putting any topical agents on until the stitch has fallen out.  After that, we will have her try a mild topical ster

## 2020-10-08 NOTE — TELEPHONE ENCOUNTER
Attempted to call pt on her home and cell regarding her pathology results. No answer. Was not able to leave a detailed message per verbal release, due to generic vm. Informed pt that I was calling from Dr. Rumaldo Hamman and to return my call.   Provided office

## 2020-10-13 ENCOUNTER — OFFICE VISIT (OUTPATIENT)
Dept: CARDIOLOGY | Age: 85
End: 2020-10-13

## 2020-10-13 VITALS
HEART RATE: 65 BPM | BODY MASS INDEX: 27.19 KG/M2 | WEIGHT: 144 LBS | SYSTOLIC BLOOD PRESSURE: 138 MMHG | DIASTOLIC BLOOD PRESSURE: 68 MMHG | HEIGHT: 61 IN

## 2020-10-13 DIAGNOSIS — I10 ESSENTIAL HYPERTENSION: ICD-10-CM

## 2020-10-13 DIAGNOSIS — I65.21 STENOSIS OF RIGHT CAROTID ARTERY: ICD-10-CM

## 2020-10-13 DIAGNOSIS — I48.0 PAROXYSMAL ATRIAL FIBRILLATION (CMD): ICD-10-CM

## 2020-10-13 DIAGNOSIS — I63.9 CEREBROVASCULAR ACCIDENT (CVA), UNSPECIFIED MECHANISM (CMD): Primary | ICD-10-CM

## 2020-10-13 PROCEDURE — 99214 OFFICE O/P EST MOD 30 MIN: CPT | Performed by: INTERNAL MEDICINE

## 2020-10-13 RX ORDER — FUROSEMIDE 20 MG/1
20 TABLET ORAL DAILY
Qty: 90 TABLET | Refills: 3 | Status: SHIPPED | OUTPATIENT
Start: 2020-10-13

## 2020-10-13 RX ORDER — FUROSEMIDE 20 MG/1
20 TABLET ORAL DAILY
COMMUNITY
Start: 2020-06-26 | End: 2020-10-13 | Stop reason: SDUPTHER

## 2020-10-13 RX ORDER — HYDRALAZINE HYDROCHLORIDE 25 MG/1
25 TABLET, FILM COATED ORAL 2 TIMES DAILY
COMMUNITY
Start: 2020-09-18

## 2020-10-13 RX ORDER — LABETALOL 300 MG/1
300 TABLET, FILM COATED ORAL 2 TIMES DAILY
Qty: 180 TABLET | Refills: 3 | Status: SHIPPED | OUTPATIENT
Start: 2020-10-13

## 2020-10-13 SDOH — HEALTH STABILITY: PHYSICAL HEALTH: ON AVERAGE, HOW MANY DAYS PER WEEK DO YOU ENGAGE IN MODERATE TO STRENUOUS EXERCISE (LIKE A BRISK WALK)?: 0 DAYS

## 2020-10-13 SDOH — SOCIAL STABILITY: SOCIAL NETWORK: ARE YOU MARRIED, WIDOWED, DIVORCED, SEPARATED, NEVER MARRIED, OR LIVING WITH A PARTNER?: MARRIED

## 2020-10-13 SDOH — HEALTH STABILITY: PHYSICAL HEALTH: ON AVERAGE, HOW MANY MINUTES DO YOU ENGAGE IN EXERCISE AT THIS LEVEL?: 0 MIN

## 2020-10-13 ASSESSMENT — PATIENT HEALTH QUESTIONNAIRE - PHQ9
SUM OF ALL RESPONSES TO PHQ9 QUESTIONS 1 AND 2: 0
SUM OF ALL RESPONSES TO PHQ9 QUESTIONS 1 AND 2: 0
CLINICAL INTERPRETATION OF PHQ2 SCORE: NO FURTHER SCREENING NEEDED
2. FEELING DOWN, DEPRESSED OR HOPELESS: NOT AT ALL
CLINICAL INTERPRETATION OF PHQ9 SCORE: NO FURTHER SCREENING NEEDED
1. LITTLE INTEREST OR PLEASURE IN DOING THINGS: NOT AT ALL

## 2020-10-21 ENCOUNTER — TELEPHONE (OUTPATIENT)
Dept: SURGERY | Facility: CLINIC | Age: 85
End: 2020-10-21

## 2020-10-21 DIAGNOSIS — R21 RASH: Primary | ICD-10-CM

## 2020-10-21 RX ORDER — DESONIDE 0.5 MG/ML
1 LOTION TOPICAL 2 TIMES DAILY
Qty: 59 ML | Refills: 0 | Status: SHIPPED | OUTPATIENT
Start: 2020-10-21 | End: 2021-11-10 | Stop reason: ALTCHOICE

## 2020-10-21 NOTE — TELEPHONE ENCOUNTER
Attempted to call pt back regarding the continued irritation she is having to the nipple. Pt requesting the steroid cream that Dr. Sofia Shone mentioned previously if this continued to irritate her. No answer. LVM after verifying verbal release.   Informed pt

## 2020-10-21 NOTE — TELEPHONE ENCOUNTER
----- Message from Jen Spaulding MD sent at 10/21/2020  1:30 PM CDT -----  Regarding: RE: steroid cream  I placed the order, desonide  ----- Message -----  From: Claudia Herbert RN  Sent: 10/21/2020   1:10 PM CDT  To: Jen Spaulding MD  Subject: Deuce Kaba

## 2020-12-20 NOTE — PROGRESS NOTES
[facility]  Progress Note    Milo Lean Patient Status:  Emergency    1935 MRN [id number]   Location [facility] Attending Abbie Duke MD   Hosp Day # 2 PCP Lencho Morrissey MD     No acute issues overnight  s No lesions. No erythema. Psychiatric: Appropriate mood and affect.       Recent Labs   01/03/17  1803 01/05/17  0710   WBC 11.6 7.5   HGB 11.8* 9.4*   MCV 96.2 98.5   .0 370.0         Recent Labs   01/03/17  1803 01/04/17  0644 01/05/17  0710   NA 1 no

## 2021-02-01 DIAGNOSIS — Z23 NEED FOR VACCINATION: ICD-10-CM

## 2021-03-03 ENCOUNTER — MED REC SCAN ONLY (OUTPATIENT)
Dept: FAMILY MEDICINE CLINIC | Facility: CLINIC | Age: 86
End: 2021-03-03

## 2021-03-16 ENCOUNTER — TELEPHONE (OUTPATIENT)
Dept: FAMILY MEDICINE CLINIC | Facility: CLINIC | Age: 86
End: 2021-03-16

## 2021-03-22 DIAGNOSIS — I10 ESSENTIAL HYPERTENSION: ICD-10-CM

## 2021-03-23 NOTE — TELEPHONE ENCOUNTER
LOV 9/420  Last labs 2/4/20    Last refill  hydrALAzine HCl 25 MG Oral Tab 180 tablet 1 6/26/2020    Sig:   Take 1 tablet (25 mg total) by mouth 2 (two) times daily.          Future Appointments   Date Time Provider Nu Lopez   3/29/2021  1:00 PM Pe

## 2021-03-24 RX ORDER — HYDRALAZINE HYDROCHLORIDE 25 MG/1
TABLET, FILM COATED ORAL
Qty: 180 TABLET | Refills: 0 | Status: SHIPPED | OUTPATIENT
Start: 2021-03-24 | End: 2021-06-21

## 2021-03-29 ENCOUNTER — OFFICE VISIT (OUTPATIENT)
Dept: FAMILY MEDICINE CLINIC | Facility: CLINIC | Age: 86
End: 2021-03-29
Payer: COMMERCIAL

## 2021-03-29 VITALS
HEIGHT: 61 IN | OXYGEN SATURATION: 98 % | HEART RATE: 64 BPM | BODY MASS INDEX: 26.81 KG/M2 | SYSTOLIC BLOOD PRESSURE: 142 MMHG | WEIGHT: 142 LBS | DIASTOLIC BLOOD PRESSURE: 72 MMHG | TEMPERATURE: 97 F | RESPIRATION RATE: 16 BRPM

## 2021-03-29 DIAGNOSIS — I69.398 VISUAL FIELD DEFECT DUE TO AND NOT CONCURRENT WITH CEREBROVASCULAR ACCIDENT (CVA): ICD-10-CM

## 2021-03-29 DIAGNOSIS — Z00.00 ENCOUNTER FOR ANNUAL HEALTH EXAMINATION: Primary | ICD-10-CM

## 2021-03-29 DIAGNOSIS — H53.452 DECREASED PERIPHERAL VISION OF LEFT EYE: ICD-10-CM

## 2021-03-29 DIAGNOSIS — I69.30 CHRONIC ISCHEMIC MULTIFOCAL MULTIPLE VASCULAR TERRITORIES STROKE: ICD-10-CM

## 2021-03-29 DIAGNOSIS — J44.9 ASTHMA WITH COPD (CHRONIC OBSTRUCTIVE PULMONARY DISEASE) (HCC): ICD-10-CM

## 2021-03-29 DIAGNOSIS — Z12.31 ENCOUNTER FOR SCREENING MAMMOGRAM FOR BREAST CANCER: ICD-10-CM

## 2021-03-29 DIAGNOSIS — I70.0 THORACIC AORTA ATHEROSCLEROSIS (HCC): ICD-10-CM

## 2021-03-29 DIAGNOSIS — M17.12 LOCALIZED OSTEOARTHRITIS OF LEFT KNEE: ICD-10-CM

## 2021-03-29 DIAGNOSIS — E87.1 HYPONATREMIA: ICD-10-CM

## 2021-03-29 DIAGNOSIS — I27.20 PULMONARY HYPERTENSION (HCC): ICD-10-CM

## 2021-03-29 DIAGNOSIS — L93.0 DISCOID LUPUS: ICD-10-CM

## 2021-03-29 DIAGNOSIS — R76.8 POSITIVE ANA (ANTINUCLEAR ANTIBODY): ICD-10-CM

## 2021-03-29 DIAGNOSIS — Z79.01 CHRONIC ANTICOAGULATION: ICD-10-CM

## 2021-03-29 DIAGNOSIS — M81.0 AGE-RELATED OSTEOPOROSIS WITHOUT CURRENT PATHOLOGICAL FRACTURE: ICD-10-CM

## 2021-03-29 DIAGNOSIS — I73.9 PERIPHERAL VASCULAR DISEASE (HCC): ICD-10-CM

## 2021-03-29 DIAGNOSIS — D51.8 MACROCYTIC ANEMIA WITH VITAMIN B12 DEFICIENCY: ICD-10-CM

## 2021-03-29 DIAGNOSIS — M62.838 TRAPEZIUS MUSCLE SPASM: ICD-10-CM

## 2021-03-29 DIAGNOSIS — Z86.73 H/O ISCHEMIC RIGHT PCA STROKE: ICD-10-CM

## 2021-03-29 DIAGNOSIS — I48.0 PAROXYSMAL ATRIAL FIBRILLATION (HCC): ICD-10-CM

## 2021-03-29 DIAGNOSIS — I27.20 PULMONARY HYPERTENSION, MODERATE TO SEVERE (HCC): ICD-10-CM

## 2021-03-29 DIAGNOSIS — H26.9 CATARACT, UNSPECIFIED CATARACT TYPE, UNSPECIFIED LATERALITY: ICD-10-CM

## 2021-03-29 DIAGNOSIS — N60.92 ATYPICAL DUCTAL HYPERPLASIA OF LEFT BREAST: ICD-10-CM

## 2021-03-29 DIAGNOSIS — R60.9 PERIPHERAL EDEMA: ICD-10-CM

## 2021-03-29 DIAGNOSIS — L82.0 SEBORRHEIC KERATOSES, INFLAMED: ICD-10-CM

## 2021-03-29 DIAGNOSIS — I65.21 OCCLUSION OF RIGHT CAROTID ARTERY: ICD-10-CM

## 2021-03-29 DIAGNOSIS — I10 ESSENTIAL HYPERTENSION: ICD-10-CM

## 2021-03-29 DIAGNOSIS — Z96.641 STATUS POST TOTAL REPLACEMENT OF RIGHT HIP: ICD-10-CM

## 2021-03-29 DIAGNOSIS — H53.40 VISUAL FIELD DEFECT DUE TO AND NOT CONCURRENT WITH CEREBROVASCULAR ACCIDENT (CVA): ICD-10-CM

## 2021-03-29 DIAGNOSIS — I65.21 STENOSIS OF RIGHT CAROTID ARTERY: ICD-10-CM

## 2021-03-29 PROCEDURE — 3008F BODY MASS INDEX DOCD: CPT | Performed by: FAMILY MEDICINE

## 2021-03-29 PROCEDURE — G0439 PPPS, SUBSEQ VISIT: HCPCS | Performed by: FAMILY MEDICINE

## 2021-03-29 PROCEDURE — 3077F SYST BP >= 140 MM HG: CPT | Performed by: FAMILY MEDICINE

## 2021-03-29 PROCEDURE — 3078F DIAST BP <80 MM HG: CPT | Performed by: FAMILY MEDICINE

## 2021-03-29 PROCEDURE — 99397 PER PM REEVAL EST PAT 65+ YR: CPT | Performed by: FAMILY MEDICINE

## 2021-03-29 PROCEDURE — 96160 PT-FOCUSED HLTH RISK ASSMT: CPT | Performed by: FAMILY MEDICINE

## 2021-03-29 RX ORDER — LOSARTAN POTASSIUM 100 MG/1
100 TABLET ORAL DAILY
Qty: 90 TABLET | Refills: 1 | Status: SHIPPED | OUTPATIENT
Start: 2021-03-29 | End: 2021-11-10

## 2021-03-29 RX ORDER — FUROSEMIDE 20 MG/1
20 TABLET ORAL DAILY PRN
Qty: 90 TABLET | Refills: 1 | Status: SHIPPED | OUTPATIENT
Start: 2021-03-29 | End: 2021-07-07

## 2021-03-29 RX ORDER — LABETALOL 300 MG/1
300 TABLET, FILM COATED ORAL 3 TIMES DAILY
Qty: 270 TABLET | Refills: 1 | Status: SHIPPED | OUTPATIENT
Start: 2021-03-29 | End: 2021-11-10

## 2021-03-29 NOTE — PROGRESS NOTES
HPI:   Melissa Bustos is a 82year old female who presents for a MA (Medicare Advantage) Supervisit (Once per calendar year). \"It's been a long year. \" Tends to keep shoulders hunched up. Had first vaccine dose 3/8, second 4/5.    A little arm soreness a Team: Patient Care Team:  Rosalina Pugh MD as PCP - General (Family Practice)  [place] ([id number] [facility])  Escobar Francisco DO as Consulting Physician (NEUROLOGY)    Patient Active Problem List:     Cataract refer debartolo      Status post CREATSERUM 0.91 08/18/2020    GLU 93 08/18/2020        CBC  (most recent labs)   Lab Results   Component Value Date    WBC 6.5 02/04/2020    HGB 12.2 02/04/2020    .0 02/04/2020        ALLERGIES:   She is allergic to thiazide-type diuretics, no know wire 1 site right (cpt=19281) (Right, 1970); and vivian biopsy stereo nodule 1 site left (cpt=11413) (Left, 09/2017). Her family history includes Diabetes in her brother, father, and mother.    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 Will refill but should see cardiol   -     apixaban 5 MG Oral Tab; Take 1 tablet (5 mg total) by mouth 2 (two) times daily.     Age-related osteoporosis wit complaint most bothering pt now.    -     OP REFERRAL TO [facility]    Encounter for screening mammogram for breast cancer             Diet assessment: good     PLAN:  The patient indicates understanding of these issues and agrees to th Screening      Dexascan Every two years Last Dexa Scan:    XR DEXA BONE DENSITOMETRY (CPT=77080) 07/19/2017   No flowsheet data found.     Pap and Pelvic      Pap: Every 3 yrs age 21-65 or Pap+HPV every 5 yrs age 33-67, age 72 and older at high risk There a Value   02/26/2014 4.5    No flowsheet data found. Creatinine  Annually CREATININE (mg/dL)   Date Value   02/26/2014 0.78     Creatinine (mg/dL)   Date Value   08/18/2020 0.91    No flowsheet data found.     Drug Serum Conc  Annually No results found for every 5 yrs including Total, LDL and Trigs LDL Cholesterol (mg/dL)   Date Value   02/04/2020 61     Cholesterol, Total (mg/dL)   Date Value   02/04/2020 159     Triglycerides (mg/dL)   Date Value   02/04/2020 51        EKG - covered if needed at Welcome to 07/19/2017   No flowsheet data found.     Recommended for 2 year anniversary or as ordered in After Visit Summary   Pap and Pelvic      Pap: Every 3 yrs age 21-65 or Pap+HPV every 5 yrs age 33-67, Covered every 2 yrs up to age 79 or Yearly if High Risk   Th this or any previous visit. This may be covered with your pharmacy  prescription benefits     Recommended Websites for Advanced Directives    [email or web address][email or web address] org/publications/[email or web address]/personal_[email or web address]. pdf  An information packet, including necessary form from

## 2021-03-29 NOTE — PATIENT INSTRUCTIONS
Increase labetalol to 1 pill 3 times daily, recheck blood pressure at home and with us in 6 months, Dr. Jennifer Boyle sooner. Mammogram in August, bone density then or before. Referral Dr. Ericka García and dermatology.      Recheck in 6 months  [facility] No results found for this or any previous visit.  Limited to patients who meet one of the following criteria:   • Men who are 73-68 years old and have smoked more than 100 cigarettes in their lifetime   • Anyone with a family history    Colorectal Cancer Sc after 75 There are no preventive care reminders to display for this patient.  Please get this Mammogram regularly   Immunizations      Influenza  Covered Annually Orders placed or performed in visit on 09/04/20   • FLU VACC HIGH DOSE PRSV FREE   Orders plac also has the State forms available on it's website for anyone to review and print using their home computer and printer. (the forms are also available in [place]  www. [employer]. org  This link also has information from the [address]

## 2021-04-30 ENCOUNTER — LAB ENCOUNTER (OUTPATIENT)
Dept: LAB | Age: 86
End: 2021-04-30
Attending: FAMILY MEDICINE
Payer: MEDICARE

## 2021-04-30 PROCEDURE — 80061 LIPID PANEL: CPT | Performed by: FAMILY MEDICINE

## 2021-04-30 PROCEDURE — 82607 VITAMIN B-12: CPT | Performed by: FAMILY MEDICINE

## 2021-04-30 PROCEDURE — 36415 COLL VENOUS BLD VENIPUNCTURE: CPT | Performed by: FAMILY MEDICINE

## 2021-04-30 PROCEDURE — 80053 COMPREHEN METABOLIC PANEL: CPT | Performed by: FAMILY MEDICINE

## 2021-05-23 DIAGNOSIS — I69.30 CHRONIC ISCHEMIC MULTIFOCAL MULTIPLE VASCULAR TERRITORIES STROKE: ICD-10-CM

## 2021-05-23 DIAGNOSIS — Z79.01 CHRONIC ANTICOAGULATION: ICD-10-CM

## 2021-05-24 RX ORDER — APIXABAN 5 MG/1
TABLET, FILM COATED ORAL
Qty: 180 TABLET | Refills: 0 | OUTPATIENT
Start: 2021-05-24

## 2021-05-24 NOTE — TELEPHONE ENCOUNTER
LOV    LAST LAB    LAST RX   apixaban 5 MG Oral Tab 180 tablet 1 3/29/2021    Sig:   Take 1 tablet (5 mg total) by mouth 2 (two) times daily. Route:   Oral     Order #:   [id number]           Next OV No future appointments. PROTOCOL none .  DENIED AS

## 2021-05-25 RX ORDER — APIXABAN 5 MG/1
TABLET, FILM COATED ORAL
Qty: 180 TABLET | Refills: 3 | Status: SHIPPED | OUTPATIENT
Start: 2021-05-25

## 2021-06-20 DIAGNOSIS — I10 ESSENTIAL HYPERTENSION: ICD-10-CM

## 2021-06-21 RX ORDER — HYDRALAZINE HYDROCHLORIDE 25 MG/1
TABLET, FILM COATED ORAL
Qty: 180 TABLET | Refills: 0 | Status: SHIPPED | OUTPATIENT
Start: 2021-06-21 | End: 2021-09-21

## 2021-06-21 NOTE — TELEPHONE ENCOUNTER
Hypertension Medications Protocol [id number]/20/2021 11:53 AM   CMP or BMP in past 12 months Protocol Details    Last serum creatinine< 2.0     Appointment in past 6 or next 3 months      LOV 3/29/21     LAST LAB   4/30/21     LAST RX   3/24/21 180 tabs

## 2021-06-24 ENCOUNTER — MED REC SCAN ONLY (OUTPATIENT)
Dept: FAMILY MEDICINE CLINIC | Facility: CLINIC | Age: 86
End: 2021-06-24

## 2021-07-28 DIAGNOSIS — I69.30 CHRONIC ISCHEMIC MULTIFOCAL MULTIPLE VASCULAR TERRITORIES STROKE: ICD-10-CM

## 2021-07-28 DIAGNOSIS — E78.00 PURE HYPERCHOLESTEROLEMIA: ICD-10-CM

## 2021-07-28 RX ORDER — ATORVASTATIN CALCIUM 40 MG/1
TABLET, FILM COATED ORAL
Qty: 90 TABLET | Refills: 0 | Status: SHIPPED | OUTPATIENT
Start: 2021-07-28 | End: 2021-11-10

## 2021-07-28 NOTE — TELEPHONE ENCOUNTER
LOV 3/29/21    LAST LAB 4/30/21    LAST RX   atorvastatin 40 MG Oral Tab 90 tablet 1 6/26/2020    Sig:   TAKE 1 TABLET BY MOUTH NIGHTLY     Route:   (none)           Next OV   Future Appointments   Date Time Provider Nu Lopez   8/9/2021 12:40 PM B

## 2021-08-09 ENCOUNTER — HOSPITAL ENCOUNTER (OUTPATIENT)
Dept: MAMMOGRAPHY | Age: 86
Discharge: HOME | End: 2021-08-09
Attending: FAMILY MEDICINE
Payer: MEDICARE

## 2021-08-09 DIAGNOSIS — N60.92 ATYPICAL DUCTAL HYPERPLASIA OF LEFT BREAST: ICD-10-CM

## 2021-08-09 PROCEDURE — 77063 BREAST TOMOSYNTHESIS BI: CPT | Performed by: FAMILY MEDICINE

## 2021-08-09 PROCEDURE — 77067 SCR MAMMO BI INCL CAD: CPT | Performed by: FAMILY MEDICINE

## 2021-09-02 ENCOUNTER — HOSPITAL ENCOUNTER (OUTPATIENT)
Dept: GENERAL RADIOLOGY | Age: 86
Discharge: HOME | End: 2021-09-02
Attending: NURSE PRACTITIONER
Payer: MEDICARE

## 2021-09-02 ENCOUNTER — OFFICE VISIT (OUTPATIENT)
Dept: FAMILY MEDICINE CLINIC | Facility: CLINIC | Age: 86
End: 2021-09-02
Payer: COMMERCIAL

## 2021-09-02 VITALS
WEIGHT: 140 LBS | BODY MASS INDEX: 26.43 KG/M2 | HEIGHT: 61 IN | RESPIRATION RATE: 16 BRPM | DIASTOLIC BLOOD PRESSURE: 70 MMHG | SYSTOLIC BLOOD PRESSURE: 148 MMHG | TEMPERATURE: 97 F | OXYGEN SATURATION: 99 % | HEART RATE: 68 BPM
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
## 2021-09-02 DIAGNOSIS — S89.92XA INJURY OF LEFT KNEE, INITIAL ENCOUNTER: ICD-10-CM

## 2021-09-02 DIAGNOSIS — S89.92XA INJURY OF LEFT KNEE, INITIAL ENCOUNTER: Primary | ICD-10-CM

## 2021-09-02 DIAGNOSIS — Z01.89 ENCOUNTER FOR LOWER EXTREMITY COMPARISON IMAGING STUDY: ICD-10-CM

## 2021-09-02 PROCEDURE — 3008F BODY MASS INDEX DOCD: CPT | Performed by: NURSE PRACTITIONER

## 2021-09-02 PROCEDURE — 73562 X-RAY EXAM OF KNEE 3: CPT | Performed by: NURSE PRACTITIONER

## 2021-09-02 PROCEDURE — 3078F DIAST BP <80 MM HG: CPT | Performed by: NURSE PRACTITIONER

## 2021-09-02 PROCEDURE — 73560 X-RAY EXAM OF KNEE 1 OR 2: CPT | Performed by: NURSE PRACTITIONER

## 2021-09-02 PROCEDURE — 99213 OFFICE O/P EST LOW 20 MIN: CPT | Performed by: NURSE PRACTITIONER

## 2021-09-02 PROCEDURE — 3077F SYST BP >= 140 MM HG: CPT | Performed by: NURSE PRACTITIONER

## 2021-09-02 RX ORDER — MOMETASONE FUROATE 1 MG/G
OINTMENT TOPICAL
COMMUNITY
Start: 2021-05-26

## 2021-09-02 RX ORDER — HYDROQUINONE 40 MG/G
CREAM TOPICAL
COMMUNITY
Start: 2021-07-07

## 2021-09-02 NOTE — PROGRESS NOTES
CHIEF COMPLAINT:     Patient presents with:  Fall: on 8/15/21. Musculoskeletal Problem: Lt       HPI:   Gagandeep Spain is a 82year old female who presents today with a chief complaint of  left knee pain. Cause - pt fell in her bathroom on 8/15.  States (two) times daily as needed (irritated skin lesion). 15 g 0   • Vitamin B-12 1000 MCG Oral Tab Take 1 tablet (1,000 mcg total) by mouth daily.  90 tablet 1      Past Medical History:   Diagnosis Date   • Arrhythmia     DX AIFIB 1/2017   • Atypical chest alicia palpable Baker's cyst.  No lateral joint line tenderness. No medial joint line tenderness. EXTREMITIES: no cyanosis, clubbing or edema. No calf tenderness or swelling. Posterior tibialis and dorsalis pedius pulses palpable  NEURO:  CMS intact.  No foc tingles, becomes discolored, or feels cold to the touch, the bandage may be too tight. Rewrap it more loosely. · If your bandage becomes too loose, rewrap it. · Do not wear an elastic bandage overnight.   Elevation  Keeping an injury elevated helps reduce

## 2021-09-02 NOTE — PATIENT INSTRUCTIONS
RICE    RICE stands for rest, ice, compression, and elevation. Doing these things helps limit pain and swelling after an injury. RICE also helps injuries heal faster. Use RICE for sprains, strains, and severe bruises or bumps.  Follow the tips on this hills

## 2021-09-14 ENCOUNTER — TELEPHONE (OUTPATIENT)
Dept: FAMILY MEDICINE CLINIC | Facility: CLINIC | Age: 86
End: 2021-09-14

## 2021-09-14 ENCOUNTER — HOSPITAL ENCOUNTER (EMERGENCY)
Facility: HOSPITAL | Age: 86
Discharge: HOME | End: 2021-09-14
Attending: EMERGENCY MEDICINE
Payer: MEDICARE

## 2021-09-14 ENCOUNTER — APPOINTMENT (OUTPATIENT)
Dept: CT IMAGING | Facility: HOSPITAL | Age: 86
End: 2021-09-14
Attending: EMERGENCY MEDICINE
Payer: MEDICARE

## 2021-09-14 VITALS
DIASTOLIC BLOOD PRESSURE: 76 MMHG | HEART RATE: 63 BPM | TEMPERATURE: 97 F | HEIGHT: 61 IN | SYSTOLIC BLOOD PRESSURE: 158 MMHG | RESPIRATION RATE: 14 BRPM | BODY MASS INDEX: 27.38 KG/M2 | WEIGHT: 145 LBS | OXYGEN SATURATION: 100 %

## 2021-09-14 DIAGNOSIS — R55 SYNCOPE AND COLLAPSE: ICD-10-CM

## 2021-09-14 DIAGNOSIS — S01.01XA LACERATION OF SCALP, INITIAL ENCOUNTER: Primary | ICD-10-CM

## 2021-09-14 LAB
ALBUMIN SERPL-MCNC: 3.5 G/DL (ref 3.4–5)
ALBUMIN/GLOB SERPL: 0.8 {RATIO} (ref 1–2)
ALP LIVER SERPL-CCNC: 91 U/L
ALT SERPL-CCNC: 23 U/L
ANION GAP SERPL CALC-SCNC: 4 MMOL/L (ref 0–18)
AST SERPL-CCNC: 27 U/L (ref 15–37)
ATRIAL RATE: 62 BPM
BASOPHILS # BLD AUTO: 0.02 X10(3) UL (ref 0–0.2)
BASOPHILS NFR BLD AUTO: 0.4 %
BILIRUB SERPL-MCNC: 1.7 MG/DL (ref 0.1–2)
BUN BLD-MCNC: 8 MG/DL (ref 7–18)
CALCIUM BLD-MCNC: 8.9 MG/DL (ref 8.5–10.1)
CHLORIDE SERPL-SCNC: 103 MMOL/L (ref 98–112)
CO2 SERPL-SCNC: 26 MMOL/L (ref 21–32)
CREAT BLD-MCNC: 0.83 MG/DL
EOSINOPHIL # BLD AUTO: 0.03 X10(3) UL (ref 0–0.7)
EOSINOPHIL NFR BLD AUTO: 0.6 %
ERYTHROCYTE [DISTWIDTH] IN BLOOD BY AUTOMATED COUNT: 12.2 %
GLOBULIN PLAS-MCNC: 4.2 G/DL (ref 2.8–4.4)
GLUCOSE BLD-MCNC: 92 MG/DL (ref 70–99)
HCT VFR BLD AUTO: 35.3 %
HGB BLD-MCNC: 12.2 G/DL
IMM GRANULOCYTES # BLD AUTO: 0.01 X10(3) UL (ref 0–1)
IMM GRANULOCYTES NFR BLD: 0.2 %
LYMPHOCYTES # BLD AUTO: 1.14 X10(3) UL (ref 1–4)
LYMPHOCYTES NFR BLD AUTO: 21.6 %
MCH RBC QN AUTO: 33.8 PG (ref 26–34)
MCHC RBC AUTO-ENTMCNC: 34.6 G/DL (ref 31–37)
MCV RBC AUTO: 97.8 FL
MONOCYTES # BLD AUTO: 0.69 X10(3) UL (ref 0.1–1)
MONOCYTES NFR BLD AUTO: 13.1 %
NEUTROPHILS # BLD AUTO: 3.38 X10 (3) UL (ref 1.5–7.7)
NEUTROPHILS # BLD AUTO: 3.38 X10(3) UL (ref 1.5–7.7)
NEUTROPHILS NFR BLD AUTO: 64.1 %
OSMOLALITY SERPL CALC.SUM OF ELEC: 274 MOSM/KG (ref 275–295)
P AXIS: 58 DEGREES
P-R INTERVAL: 202 MS
PLATELET # BLD AUTO: 206 10(3)UL (ref 150–450)
POTASSIUM SERPL-SCNC: 4.3 MMOL/L (ref 3.5–5.1)
PROT SERPL-MCNC: 7.7 G/DL (ref 6.4–8.2)
Q-T INTERVAL: 452 MS
QRS DURATION: 92 MS
QTC CALCULATION (BEZET): 458 MS
R AXIS: 8 DEGREES
RBC # BLD AUTO: 3.61 X10(6)UL
SODIUM SERPL-SCNC: 133 MMOL/L (ref 136–145)
T AXIS: 20 DEGREES
VENTRICULAR RATE: 62 BPM
WBC # BLD AUTO: 5.3 X10(3) UL (ref 4–11)

## 2021-09-14 PROCEDURE — 36415 COLL VENOUS BLD VENIPUNCTURE: CPT

## 2021-09-14 PROCEDURE — 93005 ELECTROCARDIOGRAM TRACING: CPT

## 2021-09-14 PROCEDURE — 85025 COMPLETE CBC W/AUTO DIFF WBC: CPT | Performed by: EMERGENCY MEDICINE

## 2021-09-14 PROCEDURE — 72125 CT NECK SPINE W/O DYE: CPT | Performed by: EMERGENCY MEDICINE

## 2021-09-14 PROCEDURE — 99284 EMERGENCY DEPT VISIT MOD MDM: CPT

## 2021-09-14 PROCEDURE — 80053 COMPREHEN METABOLIC PANEL: CPT | Performed by: EMERGENCY MEDICINE

## 2021-09-14 PROCEDURE — 70450 CT HEAD/BRAIN W/O DYE: CPT | Performed by: EMERGENCY MEDICINE

## 2021-09-14 PROCEDURE — 93010 ELECTROCARDIOGRAM REPORT: CPT

## 2021-09-14 NOTE — TELEPHONE ENCOUNTER
Pt calling stating that last night she bumped her head on the tile floor. Had some bleeding and would like to speak with nurse. Took tylenol last night and this morning. Has history of hyponatremia.

## 2021-09-14 NOTE — TELEPHONE ENCOUNTER
Triage call transferred. Spoke with pt stating fell last night and hit head on tile floor. Noted some bleeding from head. Pt is on anticoagulant. OTC Tylenol for pain.  Confirmed with pt,  home with pt- advised for  to take pt to ER now for f

## 2021-09-14 NOTE — ED PROVIDER NOTES
Patient Seen in: [facility] Emergency Department      History   Patient presents with:  Syncope  Head Neck Injury    Stated Complaint: syncope on eliquis    Subjective:   HPI    Patient is an 56-year-old woman here after syncopal episode yesterday a [facility] MAIN OR                No pertinent social history. Review of Systems    Positive for stated complaint: syncope on eliquis  Other systems are as noted in HPI. Constitutional and vital signs reviewed.       All other systems reviewed and negat (*)     All other components within normal limits   CBC W/ DIFFERENTIAL - Abnormal; Notable for the following components:    RBC 3.61 (*)     All other components within normal limits   CBC WITH DIFFERENTIAL WITH PLATELET    Narrative:      The following or definite fracture is seen. CONCLUSION:  See above.    Dictated by (CST): Ryne Donaldson MD on 9/02/2021 at 1:27 PM     Finalized by (CST): Ryne Donaldson MD on 9/02/2021 at 1:28 PM       XR KNEE ROUTINE (3 VIEWS), LEFT (MKV=80862)    Result Date: 9/2/202 There is decreased attenuation within the periventricular and subcortical deep white matter consistent with chronic microvascular ischemic changes. There is an old cortical infarct seen within the right occipital lobe.   There is nothing specific for acute Disposition and Plan     Clinical Impression:  Laceration of scalp, initial encounter  (primary encounter diagnosis)  Syncope and collapse     Disposition:  Discharge  9/14/2021  6:27 pm    Follow-up:  Reyna Rapp MD  [address] [place] [address]

## 2021-09-14 NOTE — ED INITIAL ASSESSMENT (HPI)
Pt states she had a syncopal episode in the bathroom last night and hit the back of her head on the tile floor. Pt states having bleeding to the back of the head last night, no current active bleeding. Pt is on blood thinners.  Pt states feeling sore today,

## 2021-09-21 DIAGNOSIS — I10 ESSENTIAL HYPERTENSION: ICD-10-CM

## 2021-09-22 RX ORDER — HYDRALAZINE HYDROCHLORIDE 25 MG/1
25 TABLET, FILM COATED ORAL 2 TIMES DAILY
Qty: 180 TABLET | Refills: 0 | Status: SHIPPED | OUTPATIENT
Start: 2021-09-22 | End: 2021-11-10 | Stop reason: ALTCHOICE

## 2021-09-22 NOTE — TELEPHONE ENCOUNTER
LOV 3/29/21 for physical     LAST LAB 4/30/21     LAST RX   HYDRALAZINE HCL 25 MG Oral Tab 180 tablet 0 6/21/2021    Sig:   Take 1 tablet by mouth twice daily           Next OV No future appointments.     PROTOCOL    Hypertension Medications Protocol Passed

## 2021-11-10 ENCOUNTER — OFFICE VISIT (OUTPATIENT)
Dept: FAMILY MEDICINE CLINIC | Facility: CLINIC | Age: 86
End: 2021-11-10
Payer: COMMERCIAL

## 2021-11-10 VITALS
HEIGHT: 61 IN | DIASTOLIC BLOOD PRESSURE: 68 MMHG | TEMPERATURE: 97 F | OXYGEN SATURATION: 98 % | HEART RATE: 64 BPM | SYSTOLIC BLOOD PRESSURE: 130 MMHG | BODY MASS INDEX: 26.24 KG/M2 | WEIGHT: 139 LBS | RESPIRATION RATE: 18 BRPM

## 2021-11-10 DIAGNOSIS — I10 ESSENTIAL HYPERTENSION: ICD-10-CM

## 2021-11-10 DIAGNOSIS — R60.9 PERIPHERAL EDEMA: ICD-10-CM

## 2021-11-10 DIAGNOSIS — E78.00 PURE HYPERCHOLESTEROLEMIA: Primary | ICD-10-CM

## 2021-11-10 DIAGNOSIS — I48.0 PAROXYSMAL ATRIAL FIBRILLATION (HCC): ICD-10-CM

## 2021-11-10 DIAGNOSIS — Z79.01 CHRONIC ANTICOAGULATION: ICD-10-CM

## 2021-11-10 DIAGNOSIS — J44.9 ASTHMA WITH COPD (CHRONIC OBSTRUCTIVE PULMONARY DISEASE) (HCC): ICD-10-CM

## 2021-11-10 PROBLEM — M62.838 TRAPEZIUS MUSCLE SPASM: Status: RESOLVED | Noted: 2021-03-29 | Resolved: 2021-11-10

## 2021-11-10 PROCEDURE — 99214 OFFICE O/P EST MOD 30 MIN: CPT | Performed by: FAMILY MEDICINE

## 2021-11-10 PROCEDURE — 3078F DIAST BP <80 MM HG: CPT | Performed by: FAMILY MEDICINE

## 2021-11-10 PROCEDURE — 3008F BODY MASS INDEX DOCD: CPT | Performed by: FAMILY MEDICINE

## 2021-11-10 PROCEDURE — 3075F SYST BP GE 130 - 139MM HG: CPT | Performed by: FAMILY MEDICINE

## 2021-11-10 RX ORDER — LOSARTAN POTASSIUM 100 MG/1
100 TABLET ORAL DAILY
Qty: 90 TABLET | Refills: 1 | Status: SHIPPED | OUTPATIENT
Start: 2021-11-10

## 2021-11-10 RX ORDER — LABETALOL 300 MG/1
300 TABLET, FILM COATED ORAL 2 TIMES DAILY
Qty: 180 TABLET | Refills: 1 | Status: SHIPPED | OUTPATIENT
Start: 2021-11-10

## 2021-11-10 RX ORDER — ATORVASTATIN CALCIUM 40 MG/1
TABLET, FILM COATED ORAL
Qty: 90 TABLET | Refills: 1 | Status: SHIPPED | OUTPATIENT
Start: 2021-11-10

## 2021-11-10 NOTE — PROGRESS NOTES
Patricio Tejeda is a 82year old female.   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  -     Labetalol HCl 300 MG Oral Tab;  Take 1 tablet (300 mg total) by mouth 2 (two)

## 2022-05-02 DIAGNOSIS — E78.00 PURE HYPERCHOLESTEROLEMIA: ICD-10-CM

## 2022-05-04 NOTE — TELEPHONE ENCOUNTER
Pharmacy calling to follow up on refill. Said they have faxed over request. Did not receive anything from us.  Please advise    Future Appointments   Date Time Provider Nu Lopez   5/10/2022 10:40 AM Korina Ordoñez MD [facility]

## 2022-05-05 ENCOUNTER — LAB ENCOUNTER (OUTPATIENT)
Dept: LAB | Age: 87
End: 2022-05-05
Attending: FAMILY MEDICINE
Payer: MEDICARE

## 2022-05-05 DIAGNOSIS — I10 ESSENTIAL HYPERTENSION: ICD-10-CM

## 2022-05-05 DIAGNOSIS — E78.00 PURE HYPERCHOLESTEROLEMIA: ICD-10-CM

## 2022-05-05 LAB
ALBUMIN SERPL-MCNC: 3.7 G/DL (ref 3.4–5)
ALBUMIN/GLOB SERPL: 0.9 {RATIO} (ref 1–2)
ALP LIVER SERPL-CCNC: 96 U/L
ALT SERPL-CCNC: 24 U/L
ANION GAP SERPL CALC-SCNC: 4 MMOL/L (ref 0–18)
AST SERPL-CCNC: 27 U/L (ref 15–37)
BILIRUB SERPL-MCNC: 1.6 MG/DL (ref 0.1–2)
BUN BLD-MCNC: 11 MG/DL (ref 7–18)
CALCIUM BLD-MCNC: 9.3 MG/DL (ref 8.5–10.1)
CHLORIDE SERPL-SCNC: 108 MMOL/L (ref 98–112)
CHOLEST SERPL-MCNC: 180 MG/DL (ref ?–200)
CO2 SERPL-SCNC: 27 MMOL/L (ref 21–32)
CREAT BLD-MCNC: 0.95 MG/DL
FASTING PATIENT LIPID ANSWER: YES
FASTING STATUS PATIENT QL REPORTED: YES
GLOBULIN PLAS-MCNC: 4 G/DL (ref 2.8–4.4)
GLUCOSE BLD-MCNC: 91 MG/DL (ref 70–99)
HDLC SERPL-MCNC: 122 MG/DL (ref 40–59)
LDLC SERPL CALC-MCNC: 47 MG/DL (ref ?–100)
NONHDLC SERPL-MCNC: 58 MG/DL (ref ?–130)
OSMOLALITY SERPL CALC.SUM OF ELEC: 287 MOSM/KG (ref 275–295)
POTASSIUM SERPL-SCNC: 4.2 MMOL/L (ref 3.5–5.1)
PROT SERPL-MCNC: 7.7 G/DL (ref 6.4–8.2)
SODIUM SERPL-SCNC: 139 MMOL/L (ref 136–145)
TRIGL SERPL-MCNC: 52 MG/DL (ref 30–149)
VLDLC SERPL CALC-MCNC: 7 MG/DL (ref 0–30)

## 2022-05-05 PROCEDURE — 80061 LIPID PANEL: CPT

## 2022-05-05 PROCEDURE — 80053 COMPREHEN METABOLIC PANEL: CPT

## 2022-05-05 PROCEDURE — 36415 COLL VENOUS BLD VENIPUNCTURE: CPT

## 2022-05-10 ENCOUNTER — OFFICE VISIT (OUTPATIENT)
Dept: FAMILY MEDICINE CLINIC | Facility: CLINIC | Age: 87
End: 2022-05-10
Payer: COMMERCIAL

## 2022-05-10 VITALS
TEMPERATURE: 98 F | RESPIRATION RATE: 18 BRPM | HEIGHT: 61 IN | OXYGEN SATURATION: 98 % | DIASTOLIC BLOOD PRESSURE: 84 MMHG | BODY MASS INDEX: 26.81 KG/M2 | HEART RATE: 60 BPM | SYSTOLIC BLOOD PRESSURE: 128 MMHG | WEIGHT: 142 LBS

## 2022-05-10 DIAGNOSIS — I73.9 PERIPHERAL VASCULAR DISEASE (HCC): ICD-10-CM

## 2022-05-10 DIAGNOSIS — H53.40 VISUAL FIELD DEFECT DUE TO AND NOT CONCURRENT WITH CEREBROVASCULAR ACCIDENT (CVA): ICD-10-CM

## 2022-05-10 DIAGNOSIS — D51.8 MACROCYTIC ANEMIA WITH VITAMIN B12 DEFICIENCY: ICD-10-CM

## 2022-05-10 DIAGNOSIS — J44.9 ASTHMA WITH COPD (CHRONIC OBSTRUCTIVE PULMONARY DISEASE) (HCC): ICD-10-CM

## 2022-05-10 DIAGNOSIS — L82.0 SEBORRHEIC KERATOSES, INFLAMED: ICD-10-CM

## 2022-05-10 DIAGNOSIS — Z13.0 SCREENING FOR DEFICIENCY ANEMIA: ICD-10-CM

## 2022-05-10 DIAGNOSIS — M81.0 AGE-RELATED OSTEOPOROSIS WITHOUT CURRENT PATHOLOGICAL FRACTURE: ICD-10-CM

## 2022-05-10 DIAGNOSIS — Z00.00 ENCOUNTER FOR ANNUAL HEALTH EXAMINATION: Primary | ICD-10-CM

## 2022-05-10 DIAGNOSIS — I65.21 OCCLUSION OF RIGHT CAROTID ARTERY: ICD-10-CM

## 2022-05-10 DIAGNOSIS — I10 ESSENTIAL HYPERTENSION: ICD-10-CM

## 2022-05-10 DIAGNOSIS — N18.31 CHRONIC KIDNEY DISEASE, STAGE 3A (HCC): ICD-10-CM

## 2022-05-10 DIAGNOSIS — L93.0 DISCOID LUPUS: ICD-10-CM

## 2022-05-10 DIAGNOSIS — Z12.31 ENCOUNTER FOR SCREENING MAMMOGRAM FOR MALIGNANT NEOPLASM OF BREAST: ICD-10-CM

## 2022-05-10 DIAGNOSIS — E78.00 PURE HYPERCHOLESTEROLEMIA: ICD-10-CM

## 2022-05-10 DIAGNOSIS — Z13.29 SCREENING FOR THYROID DISORDER: ICD-10-CM

## 2022-05-10 DIAGNOSIS — I27.20 PULMONARY HYPERTENSION, MODERATE TO SEVERE (HCC): ICD-10-CM

## 2022-05-10 DIAGNOSIS — I69.398 VISUAL FIELD DEFECT DUE TO AND NOT CONCURRENT WITH CEREBROVASCULAR ACCIDENT (CVA): ICD-10-CM

## 2022-05-10 DIAGNOSIS — I27.20 PULMONARY HYPERTENSION (HCC): ICD-10-CM

## 2022-05-10 DIAGNOSIS — M17.12 LOCALIZED OSTEOARTHRITIS OF LEFT KNEE: ICD-10-CM

## 2022-05-10 DIAGNOSIS — Z13.6 SCREENING FOR CARDIOVASCULAR CONDITION: ICD-10-CM

## 2022-05-10 DIAGNOSIS — I48.0 PAROXYSMAL ATRIAL FIBRILLATION (HCC): ICD-10-CM

## 2022-05-10 DIAGNOSIS — I70.0 THORACIC AORTA ATHEROSCLEROSIS (HCC): ICD-10-CM

## 2022-05-10 DIAGNOSIS — Z79.01 CHRONIC ANTICOAGULATION: ICD-10-CM

## 2022-05-10 DIAGNOSIS — N60.92 ATYPICAL DUCTAL HYPERPLASIA OF LEFT BREAST: ICD-10-CM

## 2022-05-10 DIAGNOSIS — R26.89 BALANCE PROBLEMS: ICD-10-CM

## 2022-05-10 DIAGNOSIS — Z78.0 POSTMENOPAUSAL: ICD-10-CM

## 2022-05-10 DIAGNOSIS — Z13.1 SCREENING FOR DIABETES MELLITUS (DM): ICD-10-CM

## 2022-05-10 PROBLEM — R76.8 POSITIVE ANA (ANTINUCLEAR ANTIBODY): Status: RESOLVED | Noted: 2017-04-19 | Resolved: 2022-05-10

## 2022-05-10 PROCEDURE — 3008F BODY MASS INDEX DOCD: CPT | Performed by: FAMILY MEDICINE

## 2022-05-10 PROCEDURE — G0439 PPPS, SUBSEQ VISIT: HCPCS | Performed by: FAMILY MEDICINE

## 2022-05-10 PROCEDURE — 3079F DIAST BP 80-89 MM HG: CPT | Performed by: FAMILY MEDICINE

## 2022-05-10 PROCEDURE — 99397 PER PM REEVAL EST PAT 65+ YR: CPT | Performed by: FAMILY MEDICINE

## 2022-05-10 PROCEDURE — 96160 PT-FOCUSED HLTH RISK ASSMT: CPT | Performed by: FAMILY MEDICINE

## 2022-05-10 PROCEDURE — 3074F SYST BP LT 130 MM HG: CPT | Performed by: FAMILY MEDICINE

## 2022-05-10 RX ORDER — LOSARTAN POTASSIUM 100 MG/1
100 TABLET ORAL DAILY
Qty: 90 TABLET | Refills: 1 | Status: SHIPPED | OUTPATIENT
Start: 2022-05-10

## 2022-05-10 RX ORDER — ATORVASTATIN CALCIUM 40 MG/1
TABLET, FILM COATED ORAL
Qty: 90 TABLET | Refills: 1 | Status: SHIPPED | OUTPATIENT
Start: 2022-05-10

## 2022-05-10 RX ORDER — LABETALOL 300 MG/1
300 TABLET, FILM COATED ORAL 2 TIMES DAILY
Qty: 180 TABLET | Refills: 1 | Status: SHIPPED | OUTPATIENT
Start: 2022-05-10

## 2022-05-11 RX ORDER — ATORVASTATIN CALCIUM 40 MG/1
TABLET, FILM COATED ORAL
Qty: 90 TABLET | Refills: 0 | OUTPATIENT
Start: 2022-05-11

## 2022-07-01 DIAGNOSIS — I10 ESSENTIAL HYPERTENSION: ICD-10-CM

## 2022-07-01 RX ORDER — LABETALOL 300 MG/1
TABLET, FILM COATED ORAL
Qty: 180 TABLET | Refills: 0 | OUTPATIENT
Start: 2022-07-01

## 2022-07-01 NOTE — TELEPHONE ENCOUNTER
Hypertension Medications Protocol Passed 07/01/2022 12:29 PM   Protocol Details  CMP or BMP in past 12 months    Last serum creatinine< 2.0    Appointment in past 6 or next 3 months     LOV    LAST LAB    LAST RX 5/10/22 180 with 1     Next OV   Future Appointments   Date Time Provider Nu Lopez   8/11/2022  9:20 AM BK BELA RM1 [facility]   8/11/2022  9:40 AM BK DEXA RM1 BK DEXA [address]         PROTOCOL pass   Refill to soon .

## 2022-08-11 ENCOUNTER — HOSPITAL ENCOUNTER (OUTPATIENT)
Dept: BONE DENSITY | Age: 87
Discharge: HOME | End: 2022-08-11
Attending: FAMILY MEDICINE
Payer: MEDICARE

## 2022-08-11 ENCOUNTER — HOSPITAL ENCOUNTER (OUTPATIENT)
Dept: MAMMOGRAPHY | Age: 87
Discharge: HOME | End: 2022-08-11
Attending: FAMILY MEDICINE
Payer: MEDICARE

## 2022-08-11 DIAGNOSIS — N60.92 ATYPICAL DUCTAL HYPERPLASIA OF LEFT BREAST: ICD-10-CM

## 2022-08-11 DIAGNOSIS — Z78.0 POSTMENOPAUSAL: ICD-10-CM

## 2022-08-11 DIAGNOSIS — Z12.31 ENCOUNTER FOR SCREENING MAMMOGRAM FOR MALIGNANT NEOPLASM OF BREAST: ICD-10-CM

## 2022-08-11 PROCEDURE — 77080 DXA BONE DENSITY AXIAL: CPT | Performed by: FAMILY MEDICINE

## 2022-08-11 PROCEDURE — 77067 SCR MAMMO BI INCL CAD: CPT | Performed by: FAMILY MEDICINE

## 2022-08-11 PROCEDURE — 77063 BREAST TOMOSYNTHESIS BI: CPT | Performed by: FAMILY MEDICINE

## 2022-08-29 ENCOUNTER — OFFICE VISIT (OUTPATIENT)
Dept: FAMILY MEDICINE CLINIC | Facility: CLINIC | Age: 87
End: 2022-08-29
Payer: COMMERCIAL

## 2022-08-29 VITALS
HEIGHT: 61 IN | DIASTOLIC BLOOD PRESSURE: 72 MMHG | OXYGEN SATURATION: 96 % | HEART RATE: 72 BPM | WEIGHT: 139 LBS | BODY MASS INDEX: 26.24 KG/M2 | SYSTOLIC BLOOD PRESSURE: 136 MMHG | RESPIRATION RATE: 16 BRPM | TEMPERATURE: 98 F

## 2022-08-29 DIAGNOSIS — M81.0 AGE-RELATED OSTEOPOROSIS WITHOUT CURRENT PATHOLOGICAL FRACTURE: Primary | ICD-10-CM

## 2022-08-29 PROCEDURE — 3008F BODY MASS INDEX DOCD: CPT | Performed by: FAMILY MEDICINE

## 2022-08-29 PROCEDURE — 99213 OFFICE O/P EST LOW 20 MIN: CPT | Performed by: FAMILY MEDICINE

## 2022-08-29 PROCEDURE — 3075F SYST BP GE 130 - 139MM HG: CPT | Performed by: FAMILY MEDICINE

## 2022-08-29 PROCEDURE — 3078F DIAST BP <80 MM HG: CPT | Performed by: FAMILY MEDICINE

## 2022-08-29 RX ORDER — IBANDRONATE SODIUM 150 MG/1
150 TABLET, FILM COATED ORAL
Qty: 3 TABLET | Refills: 3 | Status: SHIPPED | OUTPATIENT
Start: 2022-08-29 | End: 2022-11-27

## 2022-10-10 ENCOUNTER — LAB ENCOUNTER (OUTPATIENT)
Dept: LAB | Age: 87
End: 2022-10-10
Attending: FAMILY MEDICINE
Payer: MEDICARE

## 2022-10-10 DIAGNOSIS — I10 ESSENTIAL HYPERTENSION: ICD-10-CM

## 2022-10-10 DIAGNOSIS — Z13.1 SCREENING FOR DIABETES MELLITUS (DM): ICD-10-CM

## 2022-10-10 DIAGNOSIS — Z13.6 SCREENING FOR CARDIOVASCULAR CONDITION: ICD-10-CM

## 2022-10-10 DIAGNOSIS — D51.8 MACROCYTIC ANEMIA WITH VITAMIN B12 DEFICIENCY: ICD-10-CM

## 2022-10-10 DIAGNOSIS — Z13.29 SCREENING FOR THYROID DISORDER: ICD-10-CM

## 2022-10-10 DIAGNOSIS — Z13.0 SCREENING FOR DEFICIENCY ANEMIA: ICD-10-CM

## 2022-10-10 LAB
ALBUMIN SERPL-MCNC: 3.5 G/DL (ref 3.4–5)
ALBUMIN/GLOB SERPL: 0.8 {RATIO} (ref 1–2)
ALP LIVER SERPL-CCNC: 84 U/L
ALT SERPL-CCNC: 23 U/L
ANION GAP SERPL CALC-SCNC: 5 MMOL/L (ref 0–18)
AST SERPL-CCNC: 26 U/L (ref 15–37)
BASOPHILS # BLD AUTO: 0.02 X10(3) UL (ref 0–0.2)
BASOPHILS NFR BLD AUTO: 0.3 %
BILIRUB SERPL-MCNC: 1.2 MG/DL (ref 0.1–2)
BUN BLD-MCNC: 9 MG/DL (ref 7–18)
CALCIUM BLD-MCNC: 8.8 MG/DL (ref 8.5–10.1)
CHLORIDE SERPL-SCNC: 107 MMOL/L (ref 98–112)
CHOLEST SERPL-MCNC: 171 MG/DL (ref ?–200)
CO2 SERPL-SCNC: 24 MMOL/L (ref 21–32)
CREAT BLD-MCNC: 0.92 MG/DL
EOSINOPHIL # BLD AUTO: 0.05 X10(3) UL (ref 0–0.7)
EOSINOPHIL NFR BLD AUTO: 0.8 %
ERYTHROCYTE [DISTWIDTH] IN BLOOD BY AUTOMATED COUNT: 13 %
EST. AVERAGE GLUCOSE BLD GHB EST-MCNC: 117 MG/DL (ref 68–126)
FASTING PATIENT LIPID ANSWER: YES
FASTING STATUS PATIENT QL REPORTED: YES
GFR SERPLBLD BASED ON 1.73 SQ M-ARVRAT: 61 ML/MIN/1.73M2 (ref 60–?)
GLOBULIN PLAS-MCNC: 4.2 G/DL (ref 2.8–4.4)
GLUCOSE BLD-MCNC: 80 MG/DL (ref 70–99)
HBA1C MFR BLD: 5.7 % (ref ?–5.7)
HCT VFR BLD AUTO: 36.9 %
HDLC SERPL-MCNC: 111 MG/DL (ref 40–59)
HGB BLD-MCNC: 12.4 G/DL
IMM GRANULOCYTES # BLD AUTO: 0.01 X10(3) UL (ref 0–1)
IMM GRANULOCYTES NFR BLD: 0.2 %
LDLC SERPL CALC-MCNC: 50 MG/DL (ref ?–100)
LYMPHOCYTES # BLD AUTO: 1.26 X10(3) UL (ref 1–4)
LYMPHOCYTES NFR BLD AUTO: 20.2 %
MCH RBC QN AUTO: 34.5 PG (ref 26–34)
MCHC RBC AUTO-ENTMCNC: 33.6 G/DL (ref 31–37)
MCV RBC AUTO: 102.8 FL
MONOCYTES # BLD AUTO: 0.49 X10(3) UL (ref 0.1–1)
MONOCYTES NFR BLD AUTO: 7.9 %
NEUTROPHILS # BLD AUTO: 4.4 X10 (3) UL (ref 1.5–7.7)
NEUTROPHILS # BLD AUTO: 4.4 X10(3) UL (ref 1.5–7.7)
NEUTROPHILS NFR BLD AUTO: 70.6 %
NONHDLC SERPL-MCNC: 60 MG/DL (ref ?–130)
OSMOLALITY SERPL CALC.SUM OF ELEC: 280 MOSM/KG (ref 275–295)
PLATELET # BLD AUTO: 223 10(3)UL (ref 150–450)
POTASSIUM SERPL-SCNC: 3.9 MMOL/L (ref 3.5–5.1)
PROT SERPL-MCNC: 7.7 G/DL (ref 6.4–8.2)
RBC # BLD AUTO: 3.59 X10(6)UL
SODIUM SERPL-SCNC: 136 MMOL/L (ref 136–145)
TRIGL SERPL-MCNC: 47 MG/DL (ref 30–149)
TSI SER-ACNC: 1.29 MIU/ML (ref 0.36–3.74)
VIT B12 SERPL-MCNC: 805 PG/ML (ref 193–986)
VLDLC SERPL CALC-MCNC: 7 MG/DL (ref 0–30)
WBC # BLD AUTO: 6.2 X10(3) UL (ref 4–11)

## 2022-10-10 PROCEDURE — 36415 COLL VENOUS BLD VENIPUNCTURE: CPT

## 2022-10-10 PROCEDURE — 84443 ASSAY THYROID STIM HORMONE: CPT

## 2022-10-10 PROCEDURE — 80061 LIPID PANEL: CPT

## 2022-10-10 PROCEDURE — 85025 COMPLETE CBC W/AUTO DIFF WBC: CPT

## 2022-10-10 PROCEDURE — 83036 HEMOGLOBIN GLYCOSYLATED A1C: CPT

## 2022-10-10 PROCEDURE — 82607 VITAMIN B-12: CPT

## 2022-10-10 PROCEDURE — 80053 COMPREHEN METABOLIC PANEL: CPT

## 2022-11-05 DIAGNOSIS — Z79.01 CHRONIC ANTICOAGULATION: ICD-10-CM

## 2022-11-05 DIAGNOSIS — I48.0 PAROXYSMAL ATRIAL FIBRILLATION (HCC): ICD-10-CM

## 2022-11-05 DIAGNOSIS — I10 ESSENTIAL HYPERTENSION: ICD-10-CM

## 2022-11-05 DIAGNOSIS — E78.00 PURE HYPERCHOLESTEROLEMIA: ICD-10-CM

## 2022-11-07 RX ORDER — ATORVASTATIN CALCIUM 40 MG/1
TABLET, FILM COATED ORAL
Qty: 90 TABLET | Refills: 0 | Status: SHIPPED | OUTPATIENT
Start: 2022-11-07 | End: 2022-11-10

## 2022-11-07 RX ORDER — LABETALOL 300 MG/1
TABLET, FILM COATED ORAL
Qty: 180 TABLET | Refills: 0 | Status: SHIPPED | OUTPATIENT
Start: 2022-11-07 | End: 2022-11-10

## 2022-11-07 RX ORDER — LOSARTAN POTASSIUM 100 MG/1
TABLET ORAL
Qty: 90 TABLET | Refills: 0 | Status: SHIPPED | OUTPATIENT
Start: 2022-11-07 | End: 2022-11-10

## 2022-11-09 RX ORDER — APIXABAN 5 MG/1
TABLET, FILM COATED ORAL
Qty: 180 TABLET | Refills: 0 | Status: SHIPPED | OUTPATIENT
Start: 2022-11-09 | End: 2022-11-10

## 2022-11-10 ENCOUNTER — OFFICE VISIT (OUTPATIENT)
Dept: FAMILY MEDICINE CLINIC | Facility: CLINIC | Age: 87
End: 2022-11-10
Payer: COMMERCIAL

## 2022-11-10 VITALS
TEMPERATURE: 98 F | HEIGHT: 61 IN | SYSTOLIC BLOOD PRESSURE: 142 MMHG | RESPIRATION RATE: 18 BRPM | OXYGEN SATURATION: 98 % | BODY MASS INDEX: 26.62 KG/M2 | HEART RATE: 68 BPM | DIASTOLIC BLOOD PRESSURE: 72 MMHG | WEIGHT: 141 LBS

## 2022-11-10 DIAGNOSIS — D51.8 MACROCYTIC ANEMIA WITH VITAMIN B12 DEFICIENCY: ICD-10-CM

## 2022-11-10 DIAGNOSIS — I10 ESSENTIAL HYPERTENSION: Primary | ICD-10-CM

## 2022-11-10 DIAGNOSIS — E78.00 PURE HYPERCHOLESTEROLEMIA: ICD-10-CM

## 2022-11-10 DIAGNOSIS — Z79.01 CHRONIC ANTICOAGULATION: ICD-10-CM

## 2022-11-10 DIAGNOSIS — I70.0 THORACIC AORTA ATHEROSCLEROSIS (HCC): ICD-10-CM

## 2022-11-10 DIAGNOSIS — I48.0 PAROXYSMAL ATRIAL FIBRILLATION (HCC): ICD-10-CM

## 2022-11-10 DIAGNOSIS — Z13.29 SCREENING FOR THYROID DISORDER: ICD-10-CM

## 2022-11-10 DIAGNOSIS — N18.31 CHRONIC KIDNEY DISEASE, STAGE 3A (HCC): ICD-10-CM

## 2022-11-10 PROBLEM — D68.69 OTHER THROMBOPHILIA (HCC): Status: ACTIVE | Noted: 2022-11-10

## 2022-11-10 PROCEDURE — 3008F BODY MASS INDEX DOCD: CPT | Performed by: FAMILY MEDICINE

## 2022-11-10 PROCEDURE — 99214 OFFICE O/P EST MOD 30 MIN: CPT | Performed by: FAMILY MEDICINE

## 2022-11-10 PROCEDURE — 3077F SYST BP >= 140 MM HG: CPT | Performed by: FAMILY MEDICINE

## 2022-11-10 PROCEDURE — 3078F DIAST BP <80 MM HG: CPT | Performed by: FAMILY MEDICINE

## 2022-11-10 RX ORDER — LABETALOL 300 MG/1
300 TABLET, FILM COATED ORAL 2 TIMES DAILY
Qty: 180 TABLET | Refills: 0 | Status: SHIPPED | OUTPATIENT
Start: 2022-11-10

## 2022-11-10 RX ORDER — LOSARTAN POTASSIUM 100 MG/1
100 TABLET ORAL DAILY
Qty: 90 TABLET | Refills: 0 | Status: SHIPPED | OUTPATIENT
Start: 2022-11-10

## 2022-11-10 RX ORDER — ATORVASTATIN CALCIUM 40 MG/1
40 TABLET, FILM COATED ORAL NIGHTLY
Qty: 90 TABLET | Refills: 0 | Status: SHIPPED | OUTPATIENT
Start: 2022-11-10

## 2022-12-20 DIAGNOSIS — I10 ESSENTIAL HYPERTENSION: ICD-10-CM

## 2022-12-20 RX ORDER — LABETALOL 300 MG/1
TABLET, FILM COATED ORAL
Qty: 180 TABLET | Refills: 0 | OUTPATIENT
Start: 2022-12-20

## 2022-12-28 ENCOUNTER — TELEPHONE (OUTPATIENT)
Dept: FAMILY MEDICINE CLINIC | Facility: CLINIC | Age: 87
End: 2022-12-28

## 2022-12-28 NOTE — TELEPHONE ENCOUNTER
Returned patient's call - she reports having pain, swelling and tingling that started beginning of the month. It has not improved or worsen, it has been the same. She has been taking tylenol. She was advised to take tylenol and advil alternatively. Apply heat/cold compressions. Please advise if you have any other recommendations or you want to see patient sooner?

## 2022-12-29 NOTE — TELEPHONE ENCOUNTER
Spoke with pt's  and pt was unavailable. She will call back.      Want to schedule pt on 1/3/23 at 2pm, ok per MD.

## 2022-12-29 NOTE — TELEPHONE ENCOUNTER
Pt called back and scheduled    Future Appointments   Date Time Provider Nu Lopez   1/3/2023  2:00 PM Ron Rivas MD [facility]   5/11/2023  9:40 AM Ron Rivas MD [facility]

## 2023-01-03 ENCOUNTER — OFFICE VISIT (OUTPATIENT)
Dept: FAMILY MEDICINE CLINIC | Facility: CLINIC | Age: 88
End: 2023-01-03
Payer: COMMERCIAL

## 2023-01-03 VITALS
WEIGHT: 141 LBS | TEMPERATURE: 98 F | BODY MASS INDEX: 26.62 KG/M2 | RESPIRATION RATE: 18 BRPM | DIASTOLIC BLOOD PRESSURE: 70 MMHG | OXYGEN SATURATION: 98 % | SYSTOLIC BLOOD PRESSURE: 138 MMHG | HEART RATE: 65 BPM | HEIGHT: 61 IN

## 2023-01-03 DIAGNOSIS — M79.641 PAIN IN BOTH HANDS: Primary | ICD-10-CM

## 2023-01-03 DIAGNOSIS — M25.519 PAIN IN SHOULDER REGION, UNSPECIFIED LATERALITY: ICD-10-CM

## 2023-01-03 DIAGNOSIS — M62.838 SPASM OF MUSCLE: ICD-10-CM

## 2023-01-03 DIAGNOSIS — M54.2 NECK PAIN: ICD-10-CM

## 2023-01-03 DIAGNOSIS — M79.642 PAIN IN BOTH HANDS: Primary | ICD-10-CM

## 2023-01-03 DIAGNOSIS — R20.2 PARESTHESIA OF BOTH HANDS: ICD-10-CM

## 2023-01-03 PROCEDURE — 3078F DIAST BP <80 MM HG: CPT | Performed by: FAMILY MEDICINE

## 2023-01-03 PROCEDURE — 3008F BODY MASS INDEX DOCD: CPT | Performed by: FAMILY MEDICINE

## 2023-01-03 PROCEDURE — 99213 OFFICE O/P EST LOW 20 MIN: CPT | Performed by: FAMILY MEDICINE

## 2023-01-03 PROCEDURE — 3075F SYST BP GE 130 - 139MM HG: CPT | Performed by: FAMILY MEDICINE

## 2023-01-03 RX ORDER — TRAMADOL HYDROCHLORIDE 50 MG/1
50 TABLET ORAL 2 TIMES DAILY PRN
Qty: 60 TABLET | Refills: 2 | Status: SHIPPED | OUTPATIENT
Start: 2023-01-03 | End: 2023-02-02

## 2023-01-03 NOTE — PATIENT INSTRUCTIONS
You likely have arthritis in your hands, please get the x-ray and blood work done. You can take tylenol extra strength for pain and take tramadol as needed if the pain is not well controlled.

## 2023-01-05 ENCOUNTER — HOSPITAL ENCOUNTER (OUTPATIENT)
Dept: GENERAL RADIOLOGY | Age: 88
End: 2023-01-05
Attending: FAMILY MEDICINE
Payer: MEDICARE

## 2023-01-05 ENCOUNTER — HOSPITAL ENCOUNTER (OUTPATIENT)
Dept: GENERAL RADIOLOGY | Age: 88
Discharge: HOME | End: 2023-01-05
Attending: FAMILY MEDICINE
Payer: MEDICARE

## 2023-01-05 ENCOUNTER — LAB ENCOUNTER (OUTPATIENT)
Dept: LAB | Age: 88
End: 2023-01-05
Attending: FAMILY MEDICINE
Payer: MEDICARE

## 2023-01-05 DIAGNOSIS — M79.641 PAIN IN BOTH HANDS: ICD-10-CM

## 2023-01-05 DIAGNOSIS — M79.642 PAIN IN BOTH HANDS: ICD-10-CM

## 2023-01-05 DIAGNOSIS — R20.2 PARESTHESIA OF BOTH HANDS: ICD-10-CM

## 2023-01-05 LAB
CRP SERPL-MCNC: <0.29 MG/DL (ref ?–0.3)
ERYTHROCYTE [SEDIMENTATION RATE] IN BLOOD: 44 MM/HR
VIT B12 SERPL-MCNC: 731 PG/ML (ref 193–986)

## 2023-01-05 PROCEDURE — 85652 RBC SED RATE AUTOMATED: CPT

## 2023-01-05 PROCEDURE — 86140 C-REACTIVE PROTEIN: CPT

## 2023-01-05 PROCEDURE — 82607 VITAMIN B-12: CPT

## 2023-01-05 PROCEDURE — 73130 X-RAY EXAM OF HAND: CPT | Performed by: FAMILY MEDICINE

## 2023-01-05 PROCEDURE — 36415 COLL VENOUS BLD VENIPUNCTURE: CPT

## 2023-02-13 ENCOUNTER — LAB ENCOUNTER (OUTPATIENT)
Dept: LAB | Age: 88
End: 2023-02-13
Attending: FAMILY MEDICINE
Payer: MEDICARE

## 2023-02-13 DIAGNOSIS — R70.0 ELEVATED SED RATE: ICD-10-CM

## 2023-02-13 LAB — ERYTHROCYTE [SEDIMENTATION RATE] IN BLOOD: 30 MM/HR

## 2023-02-13 PROCEDURE — 85652 RBC SED RATE AUTOMATED: CPT

## 2023-02-13 PROCEDURE — 36415 COLL VENOUS BLD VENIPUNCTURE: CPT

## 2023-03-02 ENCOUNTER — TELEPHONE (OUTPATIENT)
Dept: PHYSICAL THERAPY | Facility: HOSPITAL | Age: 88
End: 2023-03-02

## 2023-03-06 ENCOUNTER — OFFICE VISIT (OUTPATIENT)
Dept: PHYSICAL THERAPY | Facility: HOSPITAL | Age: 88
End: 2023-03-06
Attending: FAMILY MEDICINE
Payer: MEDICARE

## 2023-03-06 DIAGNOSIS — M62.838 SPASM OF MUSCLE: ICD-10-CM

## 2023-03-06 DIAGNOSIS — M25.519 PAIN IN SHOULDER REGION, UNSPECIFIED LATERALITY: ICD-10-CM

## 2023-03-06 DIAGNOSIS — M54.2 NECK PAIN: ICD-10-CM

## 2023-03-06 PROCEDURE — 97162 PT EVAL MOD COMPLEX 30 MIN: CPT

## 2023-03-06 PROCEDURE — 97110 THERAPEUTIC EXERCISES: CPT

## 2023-03-08 ENCOUNTER — OFFICE VISIT (OUTPATIENT)
Dept: PHYSICAL THERAPY | Facility: HOSPITAL | Age: 88
End: 2023-03-08
Attending: FAMILY MEDICINE
Payer: MEDICARE

## 2023-03-08 PROCEDURE — 97110 THERAPEUTIC EXERCISES: CPT

## 2023-03-08 PROCEDURE — 97112 NEUROMUSCULAR REEDUCATION: CPT

## 2023-03-08 PROCEDURE — 97140 MANUAL THERAPY 1/> REGIONS: CPT

## 2023-03-13 ENCOUNTER — OFFICE VISIT (OUTPATIENT)
Dept: PHYSICAL THERAPY | Facility: HOSPITAL | Age: 88
End: 2023-03-13
Attending: FAMILY MEDICINE
Payer: MEDICARE

## 2023-03-13 PROCEDURE — 97140 MANUAL THERAPY 1/> REGIONS: CPT

## 2023-03-13 PROCEDURE — 97110 THERAPEUTIC EXERCISES: CPT

## 2023-03-20 ENCOUNTER — OFFICE VISIT (OUTPATIENT)
Dept: PHYSICAL THERAPY | Facility: HOSPITAL | Age: 88
End: 2023-03-20
Attending: FAMILY MEDICINE
Payer: MEDICARE

## 2023-03-20 PROCEDURE — 97110 THERAPEUTIC EXERCISES: CPT

## 2023-03-20 PROCEDURE — 97140 MANUAL THERAPY 1/> REGIONS: CPT

## 2023-03-22 ENCOUNTER — OFFICE VISIT (OUTPATIENT)
Dept: PHYSICAL THERAPY | Facility: HOSPITAL | Age: 88
End: 2023-03-22
Attending: FAMILY MEDICINE
Payer: MEDICARE

## 2023-03-22 PROCEDURE — 97110 THERAPEUTIC EXERCISES: CPT

## 2023-03-22 PROCEDURE — 97140 MANUAL THERAPY 1/> REGIONS: CPT

## 2023-03-27 ENCOUNTER — OFFICE VISIT (OUTPATIENT)
Dept: PHYSICAL THERAPY | Facility: HOSPITAL | Age: 88
End: 2023-03-27
Attending: FAMILY MEDICINE
Payer: MEDICARE

## 2023-03-27 PROCEDURE — 97110 THERAPEUTIC EXERCISES: CPT

## 2023-03-27 PROCEDURE — 97140 MANUAL THERAPY 1/> REGIONS: CPT

## 2023-04-05 ENCOUNTER — OFFICE VISIT (OUTPATIENT)
Dept: PHYSICAL THERAPY | Facility: HOSPITAL | Age: 88
End: 2023-04-05
Attending: FAMILY MEDICINE
Payer: MEDICARE

## 2023-04-05 PROCEDURE — 97110 THERAPEUTIC EXERCISES: CPT

## 2023-04-05 PROCEDURE — 97140 MANUAL THERAPY 1/> REGIONS: CPT

## 2023-04-10 ENCOUNTER — APPOINTMENT (OUTPATIENT)
Dept: PHYSICAL THERAPY | Facility: HOSPITAL | Age: 88
End: 2023-04-10
Attending: FAMILY MEDICINE
Payer: MEDICARE

## 2023-04-10 ENCOUNTER — TELEPHONE (OUTPATIENT)
Dept: PHYSICAL THERAPY | Facility: HOSPITAL | Age: 88
End: 2023-04-10

## 2023-04-28 DIAGNOSIS — E78.00 PURE HYPERCHOLESTEROLEMIA: ICD-10-CM

## 2023-04-28 DIAGNOSIS — I10 ESSENTIAL HYPERTENSION: ICD-10-CM

## 2023-04-28 RX ORDER — ATORVASTATIN CALCIUM 40 MG/1
TABLET, FILM COATED ORAL
Qty: 90 TABLET | Refills: 0 | Status: SHIPPED | OUTPATIENT
Start: 2023-04-28

## 2023-04-28 RX ORDER — LOSARTAN POTASSIUM 100 MG/1
TABLET ORAL
Qty: 90 TABLET | Refills: 0 | Status: SHIPPED | OUTPATIENT
Start: 2023-04-28

## 2023-05-03 ENCOUNTER — LAB ENCOUNTER (OUTPATIENT)
Dept: LAB | Age: 88
End: 2023-05-03
Attending: FAMILY MEDICINE
Payer: MEDICARE

## 2023-05-03 DIAGNOSIS — E78.00 PURE HYPERCHOLESTEROLEMIA: ICD-10-CM

## 2023-05-03 DIAGNOSIS — I10 ESSENTIAL HYPERTENSION: ICD-10-CM

## 2023-05-03 DIAGNOSIS — D51.8 MACROCYTIC ANEMIA WITH VITAMIN B12 DEFICIENCY: ICD-10-CM

## 2023-05-03 LAB
ALBUMIN SERPL-MCNC: 3.5 G/DL (ref 3.4–5)
ALBUMIN/GLOB SERPL: 0.8 {RATIO} (ref 1–2)
ALP LIVER SERPL-CCNC: 84 U/L
ALT SERPL-CCNC: 19 U/L
ANION GAP SERPL CALC-SCNC: 2 MMOL/L (ref 0–18)
AST SERPL-CCNC: 25 U/L (ref 15–37)
BASOPHILS # BLD AUTO: 0.02 X10(3) UL (ref 0–0.2)
BASOPHILS NFR BLD AUTO: 0.4 %
BILIRUB SERPL-MCNC: 1.2 MG/DL (ref 0.1–2)
BUN BLD-MCNC: 10 MG/DL (ref 7–18)
CALCIUM BLD-MCNC: 9.1 MG/DL (ref 8.5–10.1)
CHLORIDE SERPL-SCNC: 108 MMOL/L (ref 98–112)
CHOLEST SERPL-MCNC: 152 MG/DL (ref ?–200)
CO2 SERPL-SCNC: 25 MMOL/L (ref 21–32)
CREAT BLD-MCNC: 0.9 MG/DL
EOSINOPHIL # BLD AUTO: 0.02 X10(3) UL (ref 0–0.7)
EOSINOPHIL NFR BLD AUTO: 0.4 %
ERYTHROCYTE [DISTWIDTH] IN BLOOD BY AUTOMATED COUNT: 13 %
FASTING PATIENT LIPID ANSWER: YES
FASTING STATUS PATIENT QL REPORTED: YES
GFR SERPLBLD BASED ON 1.73 SQ M-ARVRAT: 62 ML/MIN/1.73M2 (ref 60–?)
GLOBULIN PLAS-MCNC: 4.5 G/DL (ref 2.8–4.4)
GLUCOSE BLD-MCNC: 88 MG/DL (ref 70–99)
HCT VFR BLD AUTO: 37.3 %
HDLC SERPL-MCNC: 100 MG/DL (ref 40–59)
HGB BLD-MCNC: 12.2 G/DL
IMM GRANULOCYTES # BLD AUTO: 0.01 X10(3) UL (ref 0–1)
IMM GRANULOCYTES NFR BLD: 0.2 %
LDLC SERPL CALC-MCNC: 43 MG/DL (ref ?–100)
LYMPHOCYTES # BLD AUTO: 1.1 X10(3) UL (ref 1–4)
LYMPHOCYTES NFR BLD AUTO: 22.1 %
MCH RBC QN AUTO: 32.4 PG (ref 26–34)
MCHC RBC AUTO-ENTMCNC: 32.7 G/DL (ref 31–37)
MCV RBC AUTO: 99.2 FL
MONOCYTES # BLD AUTO: 0.51 X10(3) UL (ref 0.1–1)
MONOCYTES NFR BLD AUTO: 10.2 %
NEUTROPHILS # BLD AUTO: 3.32 X10 (3) UL (ref 1.5–7.7)
NEUTROPHILS # BLD AUTO: 3.32 X10(3) UL (ref 1.5–7.7)
NEUTROPHILS NFR BLD AUTO: 66.7 %
NONHDLC SERPL-MCNC: 52 MG/DL (ref ?–130)
OSMOLALITY SERPL CALC.SUM OF ELEC: 278 MOSM/KG (ref 275–295)
PLATELET # BLD AUTO: 231 10(3)UL (ref 150–450)
POTASSIUM SERPL-SCNC: 4.1 MMOL/L (ref 3.5–5.1)
PROT SERPL-MCNC: 8 G/DL (ref 6.4–8.2)
RBC # BLD AUTO: 3.76 X10(6)UL
SODIUM SERPL-SCNC: 135 MMOL/L (ref 136–145)
TRIGL SERPL-MCNC: 37 MG/DL (ref 30–149)
TSI SER-ACNC: 1.16 MIU/ML (ref 0.36–3.74)
VIT B12 SERPL-MCNC: 302 PG/ML (ref 193–986)
VLDLC SERPL CALC-MCNC: 5 MG/DL (ref 0–30)
WBC # BLD AUTO: 5 X10(3) UL (ref 4–11)

## 2023-05-03 PROCEDURE — 82607 VITAMIN B-12: CPT

## 2023-05-03 PROCEDURE — 80053 COMPREHEN METABOLIC PANEL: CPT

## 2023-05-03 PROCEDURE — 80061 LIPID PANEL: CPT

## 2023-05-03 PROCEDURE — 36415 COLL VENOUS BLD VENIPUNCTURE: CPT

## 2023-05-03 PROCEDURE — 84443 ASSAY THYROID STIM HORMONE: CPT

## 2023-05-03 PROCEDURE — 85025 COMPLETE CBC W/AUTO DIFF WBC: CPT

## 2023-05-11 ENCOUNTER — OFFICE VISIT (OUTPATIENT)
Dept: FAMILY MEDICINE CLINIC | Facility: CLINIC | Age: 88
End: 2023-05-11
Payer: MEDICARE

## 2023-05-11 VITALS
SYSTOLIC BLOOD PRESSURE: 120 MMHG | TEMPERATURE: 98 F | BODY MASS INDEX: 25.49 KG/M2 | OXYGEN SATURATION: 98 % | HEART RATE: 60 BPM | DIASTOLIC BLOOD PRESSURE: 82 MMHG | RESPIRATION RATE: 18 BRPM | HEIGHT: 61 IN | WEIGHT: 135 LBS

## 2023-05-11 DIAGNOSIS — N60.92 ATYPICAL DUCTAL HYPERPLASIA OF LEFT BREAST: ICD-10-CM

## 2023-05-11 DIAGNOSIS — I27.20 PULMONARY HYPERTENSION, MODERATE TO SEVERE (HCC): ICD-10-CM

## 2023-05-11 DIAGNOSIS — I65.21 STENOSIS OF RIGHT CAROTID ARTERY: ICD-10-CM

## 2023-05-11 DIAGNOSIS — M17.12 LOCALIZED OSTEOARTHRITIS OF LEFT KNEE: ICD-10-CM

## 2023-05-11 DIAGNOSIS — N18.31 CHRONIC KIDNEY DISEASE, STAGE 3A (HCC): ICD-10-CM

## 2023-05-11 DIAGNOSIS — I70.0 THORACIC AORTA ATHEROSCLEROSIS (HCC): ICD-10-CM

## 2023-05-11 DIAGNOSIS — M81.0 AGE-RELATED OSTEOPOROSIS WITHOUT CURRENT PATHOLOGICAL FRACTURE: ICD-10-CM

## 2023-05-11 DIAGNOSIS — L82.0 SEBORRHEIC KERATOSES, INFLAMED: ICD-10-CM

## 2023-05-11 DIAGNOSIS — J44.9 ASTHMA WITH COPD (CHRONIC OBSTRUCTIVE PULMONARY DISEASE) (HCC): ICD-10-CM

## 2023-05-11 DIAGNOSIS — H25.013 CORTICAL AGE-RELATED CATARACT OF BOTH EYES: ICD-10-CM

## 2023-05-11 DIAGNOSIS — I69.398 VISUAL FIELD DEFECT DUE TO AND NOT CONCURRENT WITH CEREBROVASCULAR ACCIDENT (CVA): ICD-10-CM

## 2023-05-11 DIAGNOSIS — D68.69 OTHER THROMBOPHILIA (HCC): ICD-10-CM

## 2023-05-11 DIAGNOSIS — R10.12 ABDOMINAL PAIN, LEFT UPPER QUADRANT: ICD-10-CM

## 2023-05-11 DIAGNOSIS — L93.0 DISCOID LUPUS: ICD-10-CM

## 2023-05-11 DIAGNOSIS — I27.20 PULMONARY HYPERTENSION (HCC): ICD-10-CM

## 2023-05-11 DIAGNOSIS — Z00.00 ENCOUNTER FOR ANNUAL HEALTH EXAMINATION: Primary | ICD-10-CM

## 2023-05-11 DIAGNOSIS — E04.2 MULTIPLE THYROID NODULES: ICD-10-CM

## 2023-05-11 DIAGNOSIS — I10 ESSENTIAL HYPERTENSION: ICD-10-CM

## 2023-05-11 DIAGNOSIS — H53.40 VISUAL FIELD DEFECT DUE TO AND NOT CONCURRENT WITH CEREBROVASCULAR ACCIDENT (CVA): ICD-10-CM

## 2023-05-11 DIAGNOSIS — I48.0 PAROXYSMAL ATRIAL FIBRILLATION (HCC): ICD-10-CM

## 2023-05-11 DIAGNOSIS — Z79.01 CHRONIC ANTICOAGULATION: ICD-10-CM

## 2023-05-11 DIAGNOSIS — I65.21 OCCLUSION OF RIGHT CAROTID ARTERY: ICD-10-CM

## 2023-05-11 DIAGNOSIS — D51.8 MACROCYTIC ANEMIA WITH VITAMIN B12 DEFICIENCY: ICD-10-CM

## 2023-05-11 DIAGNOSIS — E78.00 PURE HYPERCHOLESTEROLEMIA: ICD-10-CM

## 2023-05-11 PROCEDURE — 99213 OFFICE O/P EST LOW 20 MIN: CPT | Performed by: FAMILY MEDICINE

## 2023-05-11 PROCEDURE — 1170F FXNL STATUS ASSESSED: CPT | Performed by: FAMILY MEDICINE

## 2023-05-11 PROCEDURE — G0439 PPPS, SUBSEQ VISIT: HCPCS | Performed by: FAMILY MEDICINE

## 2023-05-11 PROCEDURE — 1160F RVW MEDS BY RX/DR IN RCRD: CPT | Performed by: FAMILY MEDICINE

## 2023-05-11 PROCEDURE — 1125F AMNT PAIN NOTED PAIN PRSNT: CPT | Performed by: FAMILY MEDICINE

## 2023-05-11 PROCEDURE — 3079F DIAST BP 80-89 MM HG: CPT | Performed by: FAMILY MEDICINE

## 2023-05-11 PROCEDURE — 96160 PT-FOCUSED HLTH RISK ASSMT: CPT | Performed by: FAMILY MEDICINE

## 2023-05-11 PROCEDURE — 3008F BODY MASS INDEX DOCD: CPT | Performed by: FAMILY MEDICINE

## 2023-05-11 PROCEDURE — 1159F MED LIST DOCD IN RCRD: CPT | Performed by: FAMILY MEDICINE

## 2023-05-11 PROCEDURE — 3074F SYST BP LT 130 MM HG: CPT | Performed by: FAMILY MEDICINE

## 2023-05-11 RX ORDER — ATORVASTATIN CALCIUM 40 MG/1
40 TABLET, FILM COATED ORAL NIGHTLY
Qty: 90 TABLET | Refills: 1 | Status: SHIPPED | OUTPATIENT
Start: 2023-05-11

## 2023-05-11 RX ORDER — LOSARTAN POTASSIUM 100 MG/1
100 TABLET ORAL DAILY
Qty: 90 TABLET | Refills: 1 | Status: SHIPPED | OUTPATIENT
Start: 2023-05-11

## 2023-06-04 DIAGNOSIS — I48.0 PAROXYSMAL ATRIAL FIBRILLATION (HCC): ICD-10-CM

## 2023-06-04 DIAGNOSIS — Z79.01 CHRONIC ANTICOAGULATION: ICD-10-CM

## 2023-06-05 ENCOUNTER — TELEPHONE (OUTPATIENT)
Dept: FAMILY MEDICINE CLINIC | Facility: CLINIC | Age: 88
End: 2023-06-05

## 2023-06-05 NOTE — TELEPHONE ENCOUNTER
Pt called stating she had her [facility] visit on 5-11-23 with Dr Max Baxter she has a chronic condition - swelling in feet & ankles. She's been experiencing a large amount of swelling due to the warm weather. Wants to know if  would put her back on a Diuretic.

## 2023-06-05 NOTE — TELEPHONE ENCOUNTER
Patient had hyponatremia with diuretics before, please have her follow up with me to discuss the swelling.

## 2023-06-05 NOTE — TELEPHONE ENCOUNTER
Dr. Mago Bryant,  Please advise if you need to see patient or if rx can be sent    [address] [place] [address] 5/11/23   PE   +22    Future Appointments   Date Time Provider Nu Lopez   11/10/2023 12:40 PM Cassie Matute MD [facility]

## 2023-06-05 NOTE — TELEPHONE ENCOUNTER
Called patient and advised of need for appt per Dr. Dania Cui. Scheduled for Wednesday.     Future Appointments   Date Time Provider Nu Lopez   6/7/2023  3:20 PM Yovany Velasco MD [facility]   11/10/2023 12:40 PM Yovany Velasco MD [facility]

## 2023-06-06 RX ORDER — APIXABAN 5 MG/1
TABLET, FILM COATED ORAL
Qty: 180 TABLET | Refills: 0 | Status: SHIPPED | OUTPATIENT
Start: 2023-06-06

## 2023-06-07 ENCOUNTER — OFFICE VISIT (OUTPATIENT)
Dept: FAMILY MEDICINE CLINIC | Facility: CLINIC | Age: 88
End: 2023-06-07
Payer: MEDICARE

## 2023-06-07 VITALS
DIASTOLIC BLOOD PRESSURE: 88 MMHG | OXYGEN SATURATION: 98 % | TEMPERATURE: 98 F | HEIGHT: 61 IN | HEART RATE: 80 BPM | SYSTOLIC BLOOD PRESSURE: 168 MMHG | BODY MASS INDEX: 26.24 KG/M2 | WEIGHT: 139 LBS | RESPIRATION RATE: 18 BRPM

## 2023-06-07 DIAGNOSIS — E87.1 HYPONATREMIA: ICD-10-CM

## 2023-06-07 DIAGNOSIS — R60.0 BILATERAL EDEMA OF LOWER EXTREMITY: Primary | ICD-10-CM

## 2023-06-07 DIAGNOSIS — I10 ESSENTIAL HYPERTENSION: ICD-10-CM

## 2023-06-07 PROCEDURE — 99213 OFFICE O/P EST LOW 20 MIN: CPT | Performed by: FAMILY MEDICINE

## 2023-06-07 PROCEDURE — 1160F RVW MEDS BY RX/DR IN RCRD: CPT | Performed by: FAMILY MEDICINE

## 2023-06-07 PROCEDURE — 3077F SYST BP >= 140 MM HG: CPT | Performed by: FAMILY MEDICINE

## 2023-06-07 PROCEDURE — 1159F MED LIST DOCD IN RCRD: CPT | Performed by: FAMILY MEDICINE

## 2023-06-07 PROCEDURE — 3079F DIAST BP 80-89 MM HG: CPT | Performed by: FAMILY MEDICINE

## 2023-06-07 PROCEDURE — 3008F BODY MASS INDEX DOCD: CPT | Performed by: FAMILY MEDICINE

## 2023-06-07 RX ORDER — HYDROCHLOROTHIAZIDE 12.5 MG/1
12.5 TABLET ORAL DAILY PRN
Qty: 30 TABLET | Refills: 0 | Status: SHIPPED | OUTPATIENT
Start: 2023-06-07 | End: 2023-07-07

## 2023-06-07 RX ORDER — LABETALOL 300 MG/1
300 TABLET, FILM COATED ORAL 2 TIMES DAILY
Qty: 180 TABLET | Refills: 1 | Status: SHIPPED | OUTPATIENT
Start: 2023-06-07

## 2023-06-14 ENCOUNTER — OFFICE VISIT (OUTPATIENT)
Dept: FAMILY MEDICINE CLINIC | Facility: CLINIC | Age: 88
End: 2023-06-14
Payer: MEDICARE

## 2023-06-14 VITALS
TEMPERATURE: 98 F | BODY MASS INDEX: 26.06 KG/M2 | RESPIRATION RATE: 18 BRPM | OXYGEN SATURATION: 98 % | WEIGHT: 138 LBS | HEIGHT: 61 IN | DIASTOLIC BLOOD PRESSURE: 76 MMHG | SYSTOLIC BLOOD PRESSURE: 134 MMHG | HEART RATE: 90 BPM

## 2023-06-14 DIAGNOSIS — R60.0 BILATERAL EDEMA OF LOWER EXTREMITY: Primary | ICD-10-CM

## 2023-06-14 DIAGNOSIS — I10 ESSENTIAL HYPERTENSION: ICD-10-CM

## 2023-06-14 DIAGNOSIS — R10.12 ABDOMINAL PAIN, LEFT UPPER QUADRANT: ICD-10-CM

## 2023-06-14 PROCEDURE — 3078F DIAST BP <80 MM HG: CPT | Performed by: FAMILY MEDICINE

## 2023-06-14 PROCEDURE — 1160F RVW MEDS BY RX/DR IN RCRD: CPT | Performed by: FAMILY MEDICINE

## 2023-06-14 PROCEDURE — 3008F BODY MASS INDEX DOCD: CPT | Performed by: FAMILY MEDICINE

## 2023-06-14 PROCEDURE — 3075F SYST BP GE 130 - 139MM HG: CPT | Performed by: FAMILY MEDICINE

## 2023-06-14 PROCEDURE — 99213 OFFICE O/P EST LOW 20 MIN: CPT | Performed by: FAMILY MEDICINE

## 2023-06-14 PROCEDURE — 1159F MED LIST DOCD IN RCRD: CPT | Performed by: FAMILY MEDICINE

## 2023-06-28 NOTE — TELEPHONE ENCOUNTER
Spoke to pt who didn't understand how her PCP was listed with Dr. Sue Chowdhury. Pt indicated she has only seen Dr. Damien Navarro. I asked pt to contact insurance and let them know to change her PCP to Dr. Damien Navarro. Pt will call the [facility].

## 2023-07-06 ENCOUNTER — LAB ENCOUNTER (OUTPATIENT)
Dept: LAB | Age: 88
End: 2023-07-06
Attending: FAMILY MEDICINE
Payer: MEDICARE

## 2023-07-06 DIAGNOSIS — E87.1 HYPONATREMIA: ICD-10-CM

## 2023-07-06 LAB
ANION GAP SERPL CALC-SCNC: 7 MMOL/L (ref 0–18)
BUN BLD-MCNC: 10 MG/DL (ref 7–18)
CALCIUM BLD-MCNC: 9.4 MG/DL (ref 8.5–10.1)
CHLORIDE SERPL-SCNC: 99 MMOL/L (ref 98–112)
CO2 SERPL-SCNC: 26 MMOL/L (ref 21–32)
CREAT BLD-MCNC: 0.97 MG/DL
FASTING STATUS PATIENT QL REPORTED: YES
GFR SERPLBLD BASED ON 1.73 SQ M-ARVRAT: 57 ML/MIN/1.73M2 (ref 60–?)
GLUCOSE BLD-MCNC: 92 MG/DL (ref 70–99)
OSMOLALITY SERPL CALC.SUM OF ELEC: 273 MOSM/KG (ref 275–295)
POTASSIUM SERPL-SCNC: 4.3 MMOL/L (ref 3.5–5.1)
SODIUM SERPL-SCNC: 132 MMOL/L (ref 136–145)

## 2023-07-06 PROCEDURE — 80048 BASIC METABOLIC PNL TOTAL CA: CPT

## 2023-07-06 PROCEDURE — 36415 COLL VENOUS BLD VENIPUNCTURE: CPT

## 2023-07-14 ENCOUNTER — HOSPITAL ENCOUNTER (OUTPATIENT)
Dept: ULTRASOUND IMAGING | Age: 88
Discharge: HOME | End: 2023-07-14
Attending: FAMILY MEDICINE
Payer: MEDICARE

## 2023-07-14 DIAGNOSIS — R10.12 ABDOMINAL PAIN, LEFT UPPER QUADRANT: ICD-10-CM

## 2023-07-14 PROCEDURE — 76700 US EXAM ABDOM COMPLETE: CPT | Performed by: FAMILY MEDICINE

## 2023-07-24 ENCOUNTER — TELEPHONE (OUTPATIENT)
Dept: FAMILY MEDICINE CLINIC | Facility: CLINIC | Age: 88
End: 2023-07-24

## 2023-07-24 ENCOUNTER — MED REC SCAN ONLY (OUTPATIENT)
Dept: FAMILY MEDICINE CLINIC | Facility: CLINIC | Age: 88
End: 2023-07-24

## 2023-07-24 NOTE — TELEPHONE ENCOUNTER
Pt requesting to speak to a nurse. Pt's bottom lip is still swollen and she's wondering if she needs an antibiotic.

## 2023-07-24 NOTE — TELEPHONE ENCOUNTER
Called patient who states she had a small cut in her lip 2-3 weeks ago. The scab came off three days ago. She has noted increased swelling and itching on her lip. Denies any fever or drainage. States she put neosporin on it. Doesn't know if it's reacting to that. Also placed ice on her lip. Advised to stop neosporin to lip and just use Vaseline or lip balm. Continue ice to lip for 5-10 minutes several times a day. Advised I will send a [employer] message that she can attach a photo to for Dr. Zaida Love to review. Explained how to attach photo to message.

## 2023-07-25 NOTE — TELEPHONE ENCOUNTER
Called patient and advised of recommendation per Dr. Marin Mendoza note. States she does not see much improvement in her lip today. She did take a benadryl on Sunday. Last night she poured peroxide over it and it burned where the cut is. Denies any drainage from lip. Advised not to use peroxide. It is not recommended for wounds any more as it actually can harm and delay tissue healing. Advised to use ice to lip for five minutes several times today and only apply lip balm or Vaseline. If no improvement by tomorrow, we will see her in the office. States she can only come in the morning.

## 2023-07-25 NOTE — TELEPHONE ENCOUNTER
Dr. Alireza Martinez,    Patient sent two photos of her swollen lip. Please advise on recommendations.

## 2023-07-25 NOTE — TELEPHONE ENCOUNTER
Reviewed image, agree with POC, if symptoms worsen in the next 24 hrs will try to see her in office tomorrow.

## 2023-07-26 NOTE — TELEPHONE ENCOUNTER
Called patient for condition update on lip swelling. States it is getting better with the ice. Clarified she is only using ice and lip balm or Vaseline. States that's all she is using. Advised to let us know if it gets worse and we will schedule appt. --HSQ for DVT ppx --on warfarin

## 2023-08-28 ENCOUNTER — PATIENT MESSAGE (OUTPATIENT)
Dept: ORTHOPEDICS CLINIC | Facility: CLINIC | Age: 88
End: 2023-08-28

## 2023-08-28 ENCOUNTER — TELEPHONE (OUTPATIENT)
Dept: ORTHOPEDICS CLINIC | Facility: CLINIC | Age: 88
End: 2023-08-28

## 2023-08-28 DIAGNOSIS — M79.641 BILATERAL HAND PAIN: Primary | ICD-10-CM

## 2023-08-28 DIAGNOSIS — M79.642 BILATERAL HAND PAIN: Primary | ICD-10-CM

## 2023-09-03 DIAGNOSIS — Z79.01 CHRONIC ANTICOAGULATION: ICD-10-CM

## 2023-09-03 DIAGNOSIS — I48.0 PAROXYSMAL ATRIAL FIBRILLATION (HCC): ICD-10-CM

## 2023-09-05 RX ORDER — APIXABAN 5 MG/1
TABLET, FILM COATED ORAL
Qty: 180 TABLET | Refills: 0 | Status: SHIPPED | OUTPATIENT
Start: 2023-09-05

## 2023-09-05 NOTE — TELEPHONE ENCOUNTER
Name from pharmacy: Eliquis 5 MG Oral Tablet         Will file in chart as: ELIQUIS 5 MG Oral Tab    Sig: Take 1 tablet by mouth twice daily    Disp: 180 tablet    Refills: 0    Start: 9/3/2023     LOV 6/14/23 DR CARD    LAST LAB    n/a     LAST RX 6/6/23 180     Next OV   Future Appointments   Date Time Provider Nu Lopez   9/21/2023 10:25 AM NAP XR RM1 NAP XRAY [facility] [place]   9/21/2023 10:40 AM RUDDY Gonsales [facility]   11/10/2023 12:40 PM June Morrison MD [facility]         PROTOCOL none

## 2023-09-06 NOTE — PATIENT INSTRUCTIONS
-- DO NOT REPLY / DO NOT REPLY ALL --  -- Message is from [employer] ([employer]) --    Patient is returning a call. Please call back. Thank you.     Caller Information       Type Contact Phone/Fax    09/06/2023 03:00 PM CDT Phone (Incoming) VINCENZO DUMONT (Mother) [phone]        Alternative phone number: [phone]    Can a detailed message be left? Yes    Message Turnaround: [place]:    Refer to site's KB page for routing instructions    Please give this turnaround time to the caller:   \"You can expect to receive a response 1-3 business days after your provider's clinical team reviews the message\"               Stay off spironolactone. Goal blood pressure range is 509-510 systolic. Start furosemide 20 mg daily in morning if blood pressure is high enough (130 or higher, see below). It should help for both blood pressure and edema (swelling in legs).     Rebecca

## 2023-09-21 ENCOUNTER — OFFICE VISIT (OUTPATIENT)
Dept: ORTHOPEDICS CLINIC | Facility: CLINIC | Age: 88
End: 2023-09-21
Payer: MEDICARE

## 2023-09-21 ENCOUNTER — HOSPITAL ENCOUNTER (OUTPATIENT)
Dept: GENERAL RADIOLOGY | Age: 88
Discharge: HOME | End: 2023-09-21
Attending: PHYSICIAN ASSISTANT
Payer: MEDICARE

## 2023-09-21 VITALS — BODY MASS INDEX: 26.43 KG/M2 | HEIGHT: 61 IN | WEIGHT: 140 LBS

## 2023-09-21 DIAGNOSIS — M19.90 INFLAMMATORY ARTHRITIS: Primary | ICD-10-CM

## 2023-09-21 DIAGNOSIS — M79.642 BILATERAL HAND PAIN: ICD-10-CM

## 2023-09-21 DIAGNOSIS — M79.641 BILATERAL HAND PAIN: ICD-10-CM

## 2023-09-21 PROCEDURE — 73130 X-RAY EXAM OF HAND: CPT | Performed by: PHYSICIAN ASSISTANT

## 2023-09-21 PROCEDURE — 1159F MED LIST DOCD IN RCRD: CPT | Performed by: PHYSICIAN ASSISTANT

## 2023-09-21 PROCEDURE — 1160F RVW MEDS BY RX/DR IN RCRD: CPT | Performed by: PHYSICIAN ASSISTANT

## 2023-09-21 PROCEDURE — 99203 OFFICE O/P NEW LOW 30 MIN: CPT | Performed by: PHYSICIAN ASSISTANT

## 2023-09-21 PROCEDURE — 3008F BODY MASS INDEX DOCD: CPT | Performed by: PHYSICIAN ASSISTANT

## 2023-09-21 PROCEDURE — 1126F AMNT PAIN NOTED NONE PRSNT: CPT | Performed by: PHYSICIAN ASSISTANT

## 2023-09-25 ENCOUNTER — TELEPHONE (OUTPATIENT)
Dept: RHEUMATOLOGY | Facility: CLINIC | Age: 88
End: 2023-09-25

## 2023-09-25 NOTE — TELEPHONE ENCOUNTER
Called pt, pt will be a new pt would like to see Dr. Colette Mcgowan but is open to first available. Please call to schedule.

## 2023-09-25 NOTE — TELEPHONE ENCOUNTER
Pt left voice message with no context. Looked in pt chart, referral is active from orthopedics office.  Call pt to schedule next available at [phone]

## 2023-10-09 NOTE — PLAN OF CARE
Assumed care ay 0730. Alert and oriented x4. Left sided weakness present. Left facial droop also present. On room air. Afib on tele. Voids adequately. Ambulated in schafer with PT. Tolerated well. No complaints of pain. Heparin gtt infusing.  Next ptt in [address] [address] [facility] Pregnancy And Lactation Text: It is unknown if this medication can harm a developing fetus or if it is excreted in breast milk.

## 2023-10-20 ENCOUNTER — MED REC SCAN ONLY (OUTPATIENT)
Dept: FAMILY MEDICINE CLINIC | Facility: CLINIC | Age: 88
End: 2023-10-20

## 2023-11-10 ENCOUNTER — OFFICE VISIT (OUTPATIENT)
Dept: FAMILY MEDICINE CLINIC | Facility: CLINIC | Age: 88
End: 2023-11-10
Payer: MEDICARE

## 2023-11-10 VITALS
BODY MASS INDEX: 24.77 KG/M2 | OXYGEN SATURATION: 98 % | HEART RATE: 60 BPM | WEIGHT: 131.19 LBS | SYSTOLIC BLOOD PRESSURE: 130 MMHG | HEIGHT: 61 IN | TEMPERATURE: 98 F | DIASTOLIC BLOOD PRESSURE: 88 MMHG | RESPIRATION RATE: 18 BRPM

## 2023-11-10 DIAGNOSIS — E78.00 PURE HYPERCHOLESTEROLEMIA: Primary | ICD-10-CM

## 2023-11-10 DIAGNOSIS — I48.0 PAROXYSMAL ATRIAL FIBRILLATION (HCC): ICD-10-CM

## 2023-11-10 DIAGNOSIS — M17.12 LOCALIZED OSTEOARTHRITIS OF LEFT KNEE: ICD-10-CM

## 2023-11-10 DIAGNOSIS — Z79.01 CHRONIC ANTICOAGULATION: ICD-10-CM

## 2023-11-10 DIAGNOSIS — Z13.29 SCREENING FOR THYROID DISORDER: ICD-10-CM

## 2023-11-10 DIAGNOSIS — Z13.0 SCREENING FOR DEFICIENCY ANEMIA: ICD-10-CM

## 2023-11-10 DIAGNOSIS — L93.0 DISCOID LUPUS: ICD-10-CM

## 2023-11-10 DIAGNOSIS — I10 ESSENTIAL HYPERTENSION: ICD-10-CM

## 2023-11-10 DIAGNOSIS — R60.0 BILATERAL EDEMA OF LOWER EXTREMITY: ICD-10-CM

## 2023-11-10 PROCEDURE — 3075F SYST BP GE 130 - 139MM HG: CPT | Performed by: FAMILY MEDICINE

## 2023-11-10 PROCEDURE — 3008F BODY MASS INDEX DOCD: CPT | Performed by: FAMILY MEDICINE

## 2023-11-10 PROCEDURE — 1160F RVW MEDS BY RX/DR IN RCRD: CPT | Performed by: FAMILY MEDICINE

## 2023-11-10 PROCEDURE — 99214 OFFICE O/P EST MOD 30 MIN: CPT | Performed by: FAMILY MEDICINE

## 2023-11-10 PROCEDURE — 3079F DIAST BP 80-89 MM HG: CPT | Performed by: FAMILY MEDICINE

## 2023-11-10 PROCEDURE — 1159F MED LIST DOCD IN RCRD: CPT | Performed by: FAMILY MEDICINE

## 2023-11-10 RX ORDER — MOMETASONE FUROATE 1 MG/G
1 CREAM TOPICAL 2 TIMES DAILY PRN
Qty: 15 G | Refills: 0 | Status: SHIPPED | OUTPATIENT
Start: 2023-11-10

## 2023-11-10 RX ORDER — HYDROCHLOROTHIAZIDE 12.5 MG/1
12.5 TABLET ORAL DAILY PRN
Qty: 90 TABLET | Refills: 0 | Status: SHIPPED | OUTPATIENT
Start: 2023-11-10 | End: 2024-02-08

## 2023-11-10 RX ORDER — LABETALOL 300 MG/1
300 TABLET, FILM COATED ORAL 2 TIMES DAILY
Qty: 180 TABLET | Refills: 1 | Status: SHIPPED | OUTPATIENT
Start: 2023-11-10

## 2023-11-10 RX ORDER — LOSARTAN POTASSIUM 100 MG/1
100 TABLET ORAL DAILY
Qty: 90 TABLET | Refills: 1 | Status: SHIPPED | OUTPATIENT
Start: 2023-11-10

## 2023-11-10 RX ORDER — ATORVASTATIN CALCIUM 40 MG/1
40 TABLET, FILM COATED ORAL NIGHTLY
Qty: 90 TABLET | Refills: 1 | Status: SHIPPED | OUTPATIENT
Start: 2023-11-10

## 2024-01-08 ENCOUNTER — OFFICE VISIT (OUTPATIENT)
Dept: RHEUMATOLOGY | Facility: CLINIC | Age: 89
End: 2024-01-08
Payer: MEDICARE

## 2024-01-08 ENCOUNTER — LAB ENCOUNTER (OUTPATIENT)
Dept: LAB | Age: 89
End: 2024-01-08
Attending: INTERNAL MEDICINE
Payer: COMMERCIAL

## 2024-01-08 VITALS
SYSTOLIC BLOOD PRESSURE: 132 MMHG | DIASTOLIC BLOOD PRESSURE: 72 MMHG | OXYGEN SATURATION: 100 % | WEIGHT: 139.38 LBS | HEIGHT: 61 IN | HEART RATE: 56 BPM | BODY MASS INDEX: 26.31 KG/M2 | RESPIRATION RATE: 16 BRPM

## 2024-01-08 DIAGNOSIS — M81.0 AGE-RELATED OSTEOPOROSIS WITHOUT CURRENT PATHOLOGICAL FRACTURE: ICD-10-CM

## 2024-01-08 DIAGNOSIS — M17.12 LOCALIZED OSTEOARTHRITIS OF LEFT KNEE: ICD-10-CM

## 2024-01-08 DIAGNOSIS — M20.092 ULNAR DEVIATION OF FINGERS OF BOTH HANDS: Primary | ICD-10-CM

## 2024-01-08 DIAGNOSIS — M12.00: ICD-10-CM

## 2024-01-08 DIAGNOSIS — M20.091 ULNAR DEVIATION OF FINGERS OF BOTH HANDS: ICD-10-CM

## 2024-01-08 DIAGNOSIS — M20.092: Primary | ICD-10-CM

## 2024-01-08 DIAGNOSIS — M20.091 ULNAR DEVIATION OF FINGERS OF BOTH HANDS: Primary | ICD-10-CM

## 2024-01-08 DIAGNOSIS — L93.0 DISCOID LUPUS: ICD-10-CM

## 2024-01-08 DIAGNOSIS — M20.092 ULNAR DEVIATION OF FINGERS OF BOTH HANDS: ICD-10-CM

## 2024-01-08 DIAGNOSIS — Z96.641 STATUS POST TOTAL REPLACEMENT OF RIGHT HIP: ICD-10-CM

## 2024-01-08 DIAGNOSIS — Z79.01 CHRONIC ANTICOAGULATION: ICD-10-CM

## 2024-01-08 DIAGNOSIS — M12.00 JACCOUD'S ARTHROPATHY (HCC): ICD-10-CM

## 2024-01-08 LAB
CRP SERPL-MCNC: <0.29 MG/DL (ref ?–0.3)
ERYTHROCYTE [SEDIMENTATION RATE] IN BLOOD: 37 MM/HR
RHEUMATOID FACT SERPL-ACNC: 46 IU/ML (ref ?–15)

## 2024-01-08 PROCEDURE — 86235 NUCLEAR ANTIGEN ANTIBODY: CPT

## 2024-01-08 PROCEDURE — 86038 ANTINUCLEAR ANTIBODIES: CPT

## 2024-01-08 PROCEDURE — 86200 CCP ANTIBODY: CPT

## 2024-01-08 PROCEDURE — 36415 COLL VENOUS BLD VENIPUNCTURE: CPT

## 2024-01-08 PROCEDURE — 86256 FLUORESCENT ANTIBODY TITER: CPT

## 2024-01-08 PROCEDURE — 86431 RHEUMATOID FACTOR QUANT: CPT

## 2024-01-08 PROCEDURE — 86225 DNA ANTIBODY NATIVE: CPT

## 2024-01-08 PROCEDURE — 85652 RBC SED RATE AUTOMATED: CPT

## 2024-01-08 PROCEDURE — 86140 C-REACTIVE PROTEIN: CPT

## 2024-01-08 RX ORDER — PSYLLIUM SEED (WITH DEXTROSE)
1 POWDER (GRAM) ORAL EVERY MORNING
COMMUNITY

## 2024-01-08 NOTE — PATIENT INSTRUCTIONS
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  It is probably due to damage to the ligaments, muscles ulnar and soft tissues in your fingers.  Testing will be done for rheumatoid arthritis and systemic lupus.  If pain occurs use ES Tylenol 500 mg twice a day,\  No ibuprofen due to being on a blood thinner.    Occupatioal therapy will be ordered to help you with your hand arthritis.  Do the labs, I will call you with the results.    Return to office in 4 weeks.

## 2024-01-08 NOTE — PROGRESS NOTES
[facility]  Report of Consultation    James Ramírez Patient Status:  No patient class for patient encounter    1935 MRN [id number]   Location [facility], [facility] PCP Ann Flowers MD     Date of Consult:  24      Reason for Consultation:   Chief Complaint   Patient presents with    [facility]     New Patient    Rheumatoid Arthritis     Referred by [facility] and PCP - regarding RA Diagnosis     Lupus     Discoid       History of Present Illness: James Ramírez is a a(n) 88 year old female.   Fingers are deformed .  Fingers have lateral deviation.  Fingers are not painful.  Left knee is painful.  Left knee is larger than the right knee.    Knuckles of fingers are puffy.  History of discoid lupus of the skin- has skin discoloration of the chest.  Trouble writing and buttoning clothes due to hand arthritis.  Trouble with scissors also.        In general feels good.  Would like more flexabliity and use of her fingers.      History:  PMH:       Asthma                  Atrial Fibrillation                   COPD                 CKD                  Discoid Lupus                  Endometrial Cancer                    Hypertension                  Hyperlipidemia                  Ischemic Stroke                  OA Left Knee                  Thyroid Nodukles                    Osteoprososis  PSH:       Right hip replaced 23 years ago.                    Hernia repair.   FAMHX:   Grandmother with arthritis.    SOCHX:   , no children.  Quit smoking years ago.  Occasional drink befroe dinner.  .      Allergies:   Allergies   Allergen Reactions    Thiazide-Type Diuretics OTHER (SEE COMMENTS)     Hyponatremia  hyponatremia  Hyponatremia    No Known Allergies ITCHING     redness    Ace Inhibitors Coughing    Adhesive Tape ITCHING     redness    Amlodipine SWELLING     Swelling of ankles    Clonidine DIZZINESS    Furosemide OTHER (SEE COMMENTS)     Severe  hyponatremia requiring hospitalization  hyponatremia       Medications:   Current Outpatient Medications:     METAMUCIL 28 % Oral Powd Pack, Take 1 packet by mouth every morning., Disp: , Rfl:     atorvastatin 40 MG Oral Tab, Take 1 tablet (40 mg total) by mouth nightly., Disp: 90 tablet, Rfl: 1    Labetalol HCl 300 MG Oral Tab, Take 1 tablet (300 mg total) by mouth 2 (two) times daily., Disp: 180 tablet, Rfl: 1    losartan 100 MG Oral Tab, Take 1 tablet (100 mg total) by mouth daily., Disp: 90 tablet, Rfl: 1    Mometasone Furoate 0.1 % External Cream, Apply 1 Application  topically 2 (two) times daily as needed (irritated skin lesion)., Disp: 15 g, Rfl: 0    apixaban (ELIQUIS) 5 MG Oral Tab, Take 1 tablet (5 mg total) by mouth 2 (two) times daily., Disp: 180 tablet, Rfl: 1    hydroCHLOROthiazide 12.5 MG Oral Tab, Take 1 tablet (12.5 mg total) by mouth daily as needed., Disp: 90 tablet, Rfl: 0    mometasone 0.1 % External Ointment, APPLY TWICE DAILY TO AFFECTED AREAS OF LEGS. STOP USING WHEN THESE ARE SMOOTH TO THE TOUCH NO LONGER RAISED. DO NOT USE FOR MORE THAN 1 JAMIR, Disp: , Rfl:     Hydroquinone 4 % External Cream, APPLY CREAM TOPICALLY TO AFFECTED AREA ONCE DAILY FOR ONE MONTH ON ONE MONTH OFF, Disp: , Rfl:     Cholecalciferol (VITAMIN D3 OR), Take by mouth. (Patient not taking: Reported on 1/8/2024), Disp: , Rfl:     Vitamin B-12 1000 MCG Oral Tab, Take 1 tablet (1,000 mcg total) by mouth daily. (Patient not taking: Reported on 1/8/2024), Disp: 90 tablet, Rfl: 1    Review of Systems:   No recent fever or chills. No recent weight loss or weight gain.  No current chest pain, shortness of breath, or palpitations.  No current abdominal pain, nausea, diarrhea, or vomiting.  No difficulty with urination. No blood in the stool or blood in urine.  No current difficulty with vision or hearing.  Joint pain/joint swelling-see HPI. No muscle pain. No leg swelling.  Wears support hose.    Discoloration of the chest. Uses  sun screen.      Physical Exam:/72 (BP Location: Right arm, Patient Position: Sitting, Cuff Size: adult)   Pulse 56   Resp 16   Ht 5' 1\" (1.549 m)   Wt 139 lb 6.4 oz (63.2 kg)   SpO2 100%   BMI 26.34 kg/m²    Older woman in NAD.   HEENT exam within normal limits. Nonicteric sclera. Conjunctiva normal.    Neck supple without thyromegaly, no lymphadenopathy.   Lungs are clear to auscultation and percussion.    Heart: normal sinus rhythm without murmur.    Abdomen: soft, nontender, no masses, no organomegaly.    Extremities without any cyanosis, clubbing, or edema.  Hands significant ulnar drift bilaterally.    No nodules in the hands or elbows.  Skin: Salt-and-pepper discoloration of the skin over the anterior chest by the VA of the neck.    Laboratory Data: 2023 glucose 92 sodium 132 potassium 4.3 chloride 99 BUN 10 creatinine 0.97 calcium 9.4 GFR 57    5/3/2023 white count 5.0 hemoglobin 12.2 hematocrit 37.3 platelet count 231,000 MCV 99  TSH 1.16.  Total protein 8.0 albumin 3.5  Alkaline phosphatase 84 AST 25 ALT 19 bilirubin 1.2 globulin 4.5     23  ESR  30       Imagin23  Xray Hands - no erosions of RA.  Degenerative changes/osteoarthritic changes of the CMC joints of both thumbs.  No significant arthropathy of the interphalangeal joints.  No evidence of erosions.    Impression: 88-year-old woman who has significant ulnar deviation of both hands.  She says it has increased the past year.  Has history of discoid lupus.  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  Testing will be done to make sure she does not have rheumatoid arthritis.  X-rays  of the hands show no erosions.    1. 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 Jaccoud's arthropathy (HCC)    3. Status post total replacement of right hip    4. Age-related osteoporosis without current pathological fracture    5. Localized osteoarthritis of left knee    6. Discoid lupus    7. Chronic anticoagulation         Recommendations:   Patient Instructions   You have ulnar deviation, also known as lateral deviation of the fingers.  It is probably due to damage to the ligaments, muscles ulnar and soft tissues in your fingers.  Testing will be done for rheumatoid arthritis and systemic lupus.  If pain occurs use ES Tylenol 500 mg twice a day,\  No ibuprofen due to being on a blood thinner.    Occupatioal therapy will be ordered to help you with your hand arthritis.  Do the labs, I will call you with the results.    Return to office in 4 weeks.           Thank you for allowing me to participate in the care of your patient.    Jose Smith MD  1/8/2024  1:22 PM

## 2024-01-09 LAB
CCP IGG SERPL-ACNC: 1.7 U/ML (ref 0–6.9)
DSDNA IGG SERPL IA-ACNC: 1.8 IU/ML
ENA AB SER QL IA: >55 UG/L
ENA AB SER QL IA: POSITIVE

## 2024-01-12 LAB — DSDNA CRITHIDIA LUCILIAE IFA: NEGATIVE

## 2024-01-18 LAB
CENTROMERE IGG SER-ACNC: 0.9 U/ML
ENA JO1 AB SER IA-ACNC: <0.3 U/ML
ENA RNP IGG SER IA-ACNC: 2.3 U/ML
ENA SCL70 IGG SER IA-ACNC: 1.2 U/ML
ENA SM IGG SER IA-ACNC: 1 U/ML
ENA SS-A IGG SER IA-ACNC: >240 U/ML
ENA SS-B IGG SER IA-ACNC: >320 U/ML
U1 SNRNP IGG SER IA-ACNC: 2.7 U/ML

## 2024-01-25 DIAGNOSIS — I10 ESSENTIAL HYPERTENSION: ICD-10-CM

## 2024-01-25 RX ORDER — LOSARTAN POTASSIUM 100 MG/1
100 TABLET ORAL DAILY
Qty: 90 TABLET | Refills: 0 | OUTPATIENT
Start: 2024-01-25

## 2024-01-25 NOTE — TELEPHONE ENCOUNTER
Hypertension Medications Protocol [id number]/25/2024 05:40 AM   Protocol Details CMP or BMP in past 12 months    Last serum creatinine< 2.0    Appointment in past 6 or next 3 months        LOV    LAST LAB     LAST RX 11/10/23 90 with 1     Next OV   Future Appointments   Date Time Provider Department Center   2/7/2024  1:15 PM Josiah Wyman OT [facility]   2/8/2024  2:40 PM Jose Smith MD [facility] [place]   2/9/2024 11:45 AM Josiah Wyman OT [facility]   2/12/2024  2:00 PM Josiah Wyman OT [facility]   2/14/2024  2:00 PM Josiah Wyman OT [facility]   2/19/2024 11:45 AM Josiah Wyman [facility]   5/8/2024 10:20 AM Ann Flowers MD [facility]         PROTOCOL pass   Refill to soon

## 2024-02-05 ENCOUNTER — TELEPHONE (OUTPATIENT)
Dept: PHYSICAL THERAPY | Facility: HOSPITAL | Age: 89
End: 2024-02-05

## 2024-02-07 ENCOUNTER — OFFICE VISIT (OUTPATIENT)
Dept: OCCUPATIONAL MEDICINE | Facility: HOSPITAL | Age: 89
End: 2024-02-07
Attending: INTERNAL MEDICINE
Payer: MEDICARE

## 2024-02-07 DIAGNOSIS — M20.091 ULNAR DEVIATION OF FINGERS OF BOTH HANDS: Primary | ICD-10-CM

## 2024-02-07 DIAGNOSIS — M20.092 ULNAR DEVIATION OF FINGERS OF BOTH HANDS: Primary | ICD-10-CM

## 2024-02-07 DIAGNOSIS — M12.00 JACCOUD'S ARTHROPATHY (HCC): ICD-10-CM

## 2024-02-07 PROCEDURE — 97110 THERAPEUTIC EXERCISES: CPT

## 2024-02-07 PROCEDURE — 97166 OT EVAL MOD COMPLEX 45 MIN: CPT

## 2024-02-07 NOTE — PROGRESS NOTES
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 Patient notes that she has a history of lupus and rheumatoid arthritis with symptoms of bilateral hand changes worsening over the past year. Patient notes significant ulnar deviation and difficulty with gripping, pinching, and fine-motor tasks. OT services received physician's orders for evaluation and treatment as indicated to maximize rehab potential.  Pt describes pain level current 3/10, at best 1/10, at worst 6/10. Patient notes aching, soreness, and sometimes sharp pain to digits with movement.  Current functional limitations include handwriting, squeezing spray bottles, twisting jars, gripping, lifting, and overall functional use of bilateral hands.     James describes prior level of function as functionally (I) w/ all ADL/IADLs.   Employment: Retired  Hand Dominance: right  Living Situation: Family  Imaging/Tests:   10/30/2023: XR HAND (MIN 3 VIEWS) BILAT (CPT=73130)   CONCLUSION:    Osteoarthritic changes involving bilateral MCP joints of the thumb and carpometacarpal joints of the thumb.     Pt goals include improving functional use of bilateral hands.  Past medical history was reviewed with James. Significant findings include lupus, Jaccoud's arthropathy, arthritis, and ulnar deviation of fingers of both hands.    ASSESSMENT  James presents to occupational therapy evaluation with primary c/o BUE arthropathy and ulnar deviation of fingers of both hands. Patient notes that she has a history of lupus and  rheumatoid arthritis with symptoms of bilateral hand changes worsening over the past year. Patient notes significant ulnar deviation and difficulty with gripping, pinching, and fine-motor tasks. OT services received physician's orders for evaluation and treatment as indicated to maximize rehab potential. The results of the objective tests and measures show decreased ROM, strength, and coordination of bilateral hands alongside need for splinting to assist with ulnar deviation of digits.  Functional deficits include but are not limited to handwriting, squeezing spray bottles, twisting jars, gripping, lifting, and overall functional use of bilateral hands. Signs and symptoms are consistent with diagnosis of ulnar deviation of fingers of both hands and Jaccoud's arthropathy. Pt and OT discussed evaluation findings, pathology, POC and HEP.  Pt voiced understanding and performs HEP correctly without reported pain. Skilled Occupational Therapy is medically necessary to address the above impairments and reach functional goals.     OBJECTIVE    OBSERVATION: Significant arthritic changes to bilateral hands resulting in ulnar deviation. Patient notes painful R thumb at times with pinching activities.    ORTHOTICS: Patient issued RUE ulnar deviation correction splint to worn during nighttime. LUE splint to be fabricated and issued to patient at next session. Patient instructed on splint care and donning/doffing.    SCAR: n/a    SENSORY: Patient denies any numbness/tingling.    EDEMA: n/a    AROM (degrees): Patient demonstrates BUE AROM for shoulders, elbows, wrists, and digits WFL. Of note, patient noted with significant ulnar deviation of bilateral hands and patient noted with swelling/pain new within the past few days to R volar radial side wrist. Digits demonstrate WFL AROM but noticeable arthritic changes and clicking with movement of digits noted.    RIGHT HAND:    Thumb IF MF RF SF   MP 0-35 0-80 0-80 0-80 0-80   PIP  IP=0-65 0-85 0-85 0-85 0-85   DIP  0-30 0-45 0-55 0-50   WRIGHT 100 195 210 220 215     RUE Thumb   Palmar Abduction 0-55   Radial Abduction 0-60     LEFT HAND:    Thumb IF MF RF SF   MP 0-15 0-80 0-80 0-80 0-80   PIP IP=0-45 0-85 0-85 0-85 0-85   DIP  0-15 0-15 0-0 0-55   WRIGHT 60 180 180 165 220     RUE Thumb   Palmar Abduction 0-70   Radial Abduction 0-70     Strength (lbs) Right Average Left Average   : 27.4 lbs 31.8 lbs   2 pt Pinch: 2.0 lbs 3.0 lbs   3 pt Pinch: 2.0 lbs 3.0 lbs   Lateral Pinch: 5.0 lbs 11.0 lbs     SPECIAL TESTS:   Nine-Hole Peg Test: R=36.8 sec; L=62.2 sec    Today’s Treatment and Response:   Pt education was provided on exam findings, treatment diagnosis, treatment plan, expectations, and prognosis. Pt was also provided recommendations for use of moist heat/ice, adaptive equipment including use of dycem and built-up  utensils, bracing, and HEP exercises.  Patient was instructed in and issued a HEP for:   -Tendon Gliding Exercises  -Thumb AROM Exercises    Charges: OT Eval: Moderate Complexity, TEx1      Total Timed Treatment: 45 min     Total Treatment Time: 45 min     Based on clinical rationale and outcome measures, this evaluation involved Moderate Complexity decision making due to 1-2 personal factors/comorbidities, 3 body structures involved/activity limitations, and evolving symptoms including changing pain levels.  PLAN OF CARE   Goals: (to be met in 8 visits)   1. Patient will be independent and compliant with use of ulnar deviation splints to facilitate increased digit postioning and function.  2. Patient will demonstrate at least 4.0 lbs or greater R 3-Point Pinch strength indicating increased ability to manage functional ADL/IADLs.  3. Patient will demonstrate at least 30.0 sec or less for RUE Nine-Hole Peg Test indicating increased ability to manage functional ADL/IADLs.  4. Patient will be independent and compliant with comprehensive HEP to maintain progress achieved in  OT.    Frequency / Duration: Patient will be seen for 2x/week or a total of 8 visits over a 90 day period.  Treatment will include: Manual Therapy, Neuromuscular Re-education, Self-Care Home Management, Therapeutic Activities, Therapeutic Exercise, and Home Exercise Program instruction, Splinting, Modalities PRN    Education or treatment limitation: None  Rehab Potential:good    QuickDASH Outcome Score  Score: 50 % (1/31/2024  1:38 PM)      Patient/Family/Caregiver was advised of these findings, precautions, and treatment options and has agreed to actively participate in planning and for this course of care.    Thank you for your referral. Please co-sign or sign and return this letter via fax as soon as possible to [phone]. If you have any questions, please contact me at Dept: [phone]    Sincerely,  Electronically signed by therapist: Josiah Wyman OT  Physician's certification required: Yes  I certify the need for these services furnished under this plan of treatment and while under my care.    X___________________________________________________ Date____________________    Certification From: 2/7/2024  To:5/7/2024

## 2024-02-09 ENCOUNTER — OFFICE VISIT (OUTPATIENT)
Dept: OCCUPATIONAL MEDICINE | Facility: HOSPITAL | Age: 89
End: 2024-02-09
Attending: INTERNAL MEDICINE
Payer: MEDICARE

## 2024-02-09 PROCEDURE — 97140 MANUAL THERAPY 1/> REGIONS: CPT

## 2024-02-09 PROCEDURE — 97110 THERAPEUTIC EXERCISES: CPT

## 2024-02-09 NOTE — PROGRESS NOTES
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 Patient notes she has been wearing R ulnar deviation about 3 hours last night. Patient notes achiness to volar radial side wrist noted with swelling at times 5/10 rated.      Objective:     ORTHOTICS: Patient provided bilateral ulnar deviation correction splints. Patient to wear them at nighttime and during daytime as possible to assist in positional correction of ulnar drift.    AROM (degrees): Patient demonstrates BUE AROM for shoulders, elbows, wrists, and digits WFL. Of note, patient noted with significant ulnar deviation of bilateral hands and patient noted with swelling/pain new within the past few days to R volar radial side wrist. Digits demonstrate WFL AROM but noticeable arthritic changes and clicking with movement of digits noted.     RIGHT HAND:     Thumb IF MF RF SF   MP 0-35 0-80 0-80 0-80 0-80   PIP IP=0-65 0-85 0-85 0-85 0-85   DIP   0-30 0-45 0-55 0-50   WRIGHT 100 195 210 220 215      RUE Thumb   Palmar Abduction 0-55   Radial Abduction 0-60      LEFT HAND:     Thumb IF MF RF SF   MP 0-15 0-80 0-80 0-80 0-80   PIP IP=0-45 0-85 0-85 0-85 0-85   DIP   0-15 0-15 0-0 0-55   WRIGHT 60 180 180 165 220      RUE Thumb   Palmar Abduction 0-70   Radial Abduction 0-70      Strength (lbs) Right Average Left Average   : 27.4 lbs 31.8 lbs   2 pt Pinch: 2.0 lbs 3.0 lbs   3 pt Pinch: 2.0 lbs 3.0 lbs   Lateral Pinch: 5.0 lbs 11.0 lbs      SPECIAL TESTS:   Nine-Hole Peg Test: R=36.8 sec; L=62.2 sec      Assessment: Patient instructed on ulnar drift splint wearing  schedule and patient educated on exercises for ROM management and light strengthening, , pinch. Patient in understanding and provided handouts to be completed daily.      Goals: (to be met in 8 visits)   1. Patient will be independent and compliant with use of ulnar deviation splints to facilitate increased digit postioning and function.  2. Patient will demonstrate at least 4.0 lbs or greater R 3-Point Pinch strength indicating increased ability to manage functional ADL/IADLs.  3. Patient will demonstrate at least 30.0 sec or less for RUE Nine-Hole Peg Test indicating increased ability to manage functional ADL/IADLs.  4. Patient will be independent and compliant with comprehensive HEP to maintain progress achieved in OT.     Plan: Patient will continue to be seen for 2x/week or a total of 8 visits over a 90 day period.  Treatment will include: Manual Therapy, Neuromuscular Re-education, Self-Care Home Management, Therapeutic Activities, Therapeutic Exercise, and Home Exercise Program instruction, Splinting, Modalities PRN    Date: 2/9/2024  TX#: 2/8 Date:                 TX#: 3/8 Date:                 TX#: 4/8 Date:                 TX#: 5/8 Date:   Tx#: 6/8   -5 min hot pack to bilateral hands  -Fitting for LUE ulnar drift splint and management for bilateral splinting  -Joint Mobilization, Joint Distractions, and PROM stretching for bilateral hand digits  -Tendon Gliding Exercises/Thumb ROM Exercises  -Green  Sponge and Red Digit Abduction Sponges  -Yellow Theraputty Strengthening Exercises       HEP:   -Tendon Gliding Exercises  -Thumb AROM Exercises  -Red/Green  and Abduction Sponges  -Yellow Theraputty Strengthening Exercises    Charges: TEx2, MTx1       Total Timed Treatment: 45 min  Total Treatment Time: 45 min

## 2024-02-12 ENCOUNTER — OFFICE VISIT (OUTPATIENT)
Dept: OCCUPATIONAL MEDICINE | Facility: HOSPITAL | Age: 89
End: 2024-02-12
Attending: INTERNAL MEDICINE
Payer: MEDICARE

## 2024-02-12 PROCEDURE — 97110 THERAPEUTIC EXERCISES: CPT

## 2024-02-12 PROCEDURE — 97140 MANUAL THERAPY 1/> REGIONS: CPT

## 2024-02-12 NOTE — PROGRESS NOTES
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 Patient wearing ulnar deviation splints and notes continued 5/10 rated aching pain to R volar wrist.      Objective:     ORTHOTICS: Patient provided bilateral ulnar deviation correction splints. Patient to wear them at nighttime and during daytime as possible to assist in positional correction of ulnar drift.    AROM (degrees): Patient demonstrates BUE AROM for shoulders, elbows, wrists, and digits WFL. Of note, patient noted with significant ulnar deviation of bilateral hands and patient noted with swelling/pain new within the past few days to R volar radial side wrist. Digits demonstrate WFL AROM but noticeable arthritic changes and clicking with movement of digits noted.     RIGHT HAND:     Thumb IF MF RF SF   MP 0-35 0-80 0-80 0-80 0-80   PIP IP=0-65 0-85 0-85 0-85 0-85   DIP   0-30 0-45 0-55 0-50   WRIGHT 100 195 210 220 215      RUE Thumb   Palmar Abduction 0-55   Radial Abduction 0-60      LEFT HAND:     Thumb IF MF RF SF   MP 0-15 0-80 0-80 0-80 0-80   PIP IP=0-45 0-85 0-85 0-85 0-85   DIP   0-15 0-15 0-0 0-55   WRIGHT 60 180 180 165 220      RUE Thumb   Palmar Abduction 0-70   Radial Abduction 0-70      Strength (lbs) Right Average Left Average   : 27.4 lbs 31.8 lbs   2 pt Pinch: 2.0 lbs 3.0 lbs   3 pt Pinch: 2.0 lbs 3.0 lbs   Lateral Pinch: 5.0 lbs 11.0 lbs      SPECIAL TESTS:   Nine-Hole Peg Test: R=36.8 sec; L=62.2 sec      Assessment: Patient instructed on ulnar drift splint wearing schedule and patient educated on exercises for ROM management and  light strengthening, , pinch. Patient in understanding and provided handouts to be completed daily.      Goals: (to be met in 8 visits)   1. Patient will be independent and compliant with use of ulnar deviation splints to facilitate increased digit postioning and function.  2. Patient will demonstrate at least 4.0 lbs or greater R 3-Point Pinch strength indicating increased ability to manage functional ADL/IADLs.  3. Patient will demonstrate at least 30.0 sec or less for RUE Nine-Hole Peg Test indicating increased ability to manage functional ADL/IADLs.  4. Patient will be independent and compliant with comprehensive HEP to maintain progress achieved in OT.     Plan: Patient will continue to be seen for 2x/week or a total of 8 visits over a 90 day period.  Treatment will include: Manual Therapy, Neuromuscular Re-education, Self-Care Home Management, Therapeutic Activities, Therapeutic Exercise, and Home Exercise Program instruction, Splinting, Modalities PRN    Date: 2/9/2024  TX#: 2/8 Date: 2/12/2024            TX#: 3/8 Date:                 TX#: 4/8 Date:                 TX#: 5/8 Date:   Tx#: 6/8   -5 min hot pack to bilateral hands  -Fitting for LUE ulnar drift splint and management for bilateral splinting  -Joint Mobilization, Joint Distractions, and PROM stretching for bilateral hand digits  -Tendon Gliding Exercises/Thumb ROM Exercises  -Green  Sponge and Red Digit Abduction Sponges  -Yellow Theraputty Strengthening Exercises -5 min hot pack to bilateral hands  -Joint Mobilization, Joint Distractions, and PROM stretching for bilateral hand digits  -Yellow Flexbar for supination 1 set of 5 reps  -AROM/AAROM/PROM R wrist movement and stretching  -Green Digiflex Full  1 set of 10 reps each; Yellow Digiflex Individual Digit 1 set of 10 reps each  -Red  Clip 3-Point Pinch marble sorting chinese  and in-hand manipulation coordination activity      HEP:   -Tendon Gliding Exercises  -Thumb AROM  Exercises  -Red/Green  and Abduction Sponges  -Yellow Theraputty Strengthening Exercises    Charges: TEx2, MTx1       Total Timed Treatment: 45 min  Total Treatment Time: 45 min

## 2024-02-14 ENCOUNTER — TELEPHONE (OUTPATIENT)
Dept: PHYSICAL THERAPY | Facility: HOSPITAL | Age: 89
End: 2024-02-14

## 2024-02-14 ENCOUNTER — TELEPHONE (OUTPATIENT)
Dept: RHEUMATOLOGY | Facility: CLINIC | Age: 89
End: 2024-02-14

## 2024-02-14 ENCOUNTER — APPOINTMENT (OUTPATIENT)
Dept: OCCUPATIONAL MEDICINE | Facility: HOSPITAL | Age: 89
End: 2024-02-14
Attending: INTERNAL MEDICINE
Payer: MEDICARE

## 2024-02-14 DIAGNOSIS — M25.532 LEFT WRIST PAIN: Primary | ICD-10-CM

## 2024-02-14 RX ORDER — TRAMADOL HYDROCHLORIDE 50 MG/1
50 TABLET ORAL 3 TIMES DAILY PRN
Qty: 30 TABLET | Refills: 1 | Status: SHIPPED | OUTPATIENT
Start: 2024-02-14

## 2024-02-14 NOTE — TELEPHONE ENCOUNTER
LOV: 1/8/24  RTO in 4mo  Future Appointments   Date Time Provider Department Center   2/19/2024 11:45 AM [facility]   2/21/2024  1:15 PM [facility]   2/27/2024 10:15 AM [facility]   3/6/2024 12:20 PM Jose Smith MD [facility] [place]   5/8/2024 10:20 AM Ann Flowers MD [facility]

## 2024-02-19 ENCOUNTER — APPOINTMENT (OUTPATIENT)
Dept: OCCUPATIONAL MEDICINE | Facility: HOSPITAL | Age: 89
End: 2024-02-19
Attending: INTERNAL MEDICINE
Payer: MEDICARE

## 2024-02-21 ENCOUNTER — APPOINTMENT (OUTPATIENT)
Dept: OCCUPATIONAL MEDICINE | Facility: HOSPITAL | Age: 89
End: 2024-02-21
Attending: FAMILY MEDICINE
Payer: MEDICARE

## 2024-02-27 ENCOUNTER — APPOINTMENT (OUTPATIENT)
Dept: OCCUPATIONAL MEDICINE | Facility: HOSPITAL | Age: 89
End: 2024-02-27
Attending: FAMILY MEDICINE
Payer: MEDICARE

## 2024-03-01 ENCOUNTER — APPOINTMENT (OUTPATIENT)
Dept: OCCUPATIONAL MEDICINE | Facility: HOSPITAL | Age: 89
End: 2024-03-01
Attending: FAMILY MEDICINE
Payer: MEDICARE

## 2024-03-06 ENCOUNTER — OFFICE VISIT (OUTPATIENT)
Dept: RHEUMATOLOGY | Facility: CLINIC | Age: 89
End: 2024-03-06
Payer: MEDICARE

## 2024-03-06 VITALS
HEIGHT: 61 IN | HEART RATE: 52 BPM | DIASTOLIC BLOOD PRESSURE: 70 MMHG | OXYGEN SATURATION: 99 % | BODY MASS INDEX: 25.68 KG/M2 | TEMPERATURE: 96 F | WEIGHT: 136 LBS | SYSTOLIC BLOOD PRESSURE: 120 MMHG

## 2024-03-06 DIAGNOSIS — M20.092 ULNAR DEVIATION OF FINGERS OF BOTH HANDS: ICD-10-CM

## 2024-03-06 DIAGNOSIS — L93.0 DISCOID LUPUS: Primary | ICD-10-CM

## 2024-03-06 DIAGNOSIS — M12.00 JACCOUD'S ARTHROPATHY (HCC): ICD-10-CM

## 2024-03-06 DIAGNOSIS — M20.091 ULNAR DEVIATION OF FINGERS OF BOTH HANDS: ICD-10-CM

## 2024-03-06 DIAGNOSIS — R76.8 POSITIVE ANA (ANTINUCLEAR ANTIBODY): ICD-10-CM

## 2024-03-06 DIAGNOSIS — R76.8 RHEUMATOID FACTOR POSITIVE: ICD-10-CM

## 2024-03-06 PROCEDURE — 99214 OFFICE O/P EST MOD 30 MIN: CPT | Performed by: INTERNAL MEDICINE

## 2024-03-06 RX ORDER — HYDROQUINONE 40 MG/G
1 CREAM TOPICAL DAILY
Qty: 30 G | Refills: 2 | Status: SHIPPED | OUTPATIENT
Start: 2024-03-06

## 2024-03-06 NOTE — PATIENT INSTRUCTIONS
Current plan - ES Tylenol 500 mg - 1-2 at a time up to 4-6 day.  No Ibuprofen or Aleve, due to use of Eliquis your blood thinner.  Your tests show that you have discoid lupus, positive lupus tests and a slightly  elevated rheumatoid test of 46, normal is < 15.  Therefore you have mild rheumatoid arthritis activity.  Continue sunblocker and hydroquinone for the discoid lupus skin lesions.  Also use moisturizing lotions like Cereve or Aquaphor.  If the arthritis worsens we could consider use of Plaquenil or methotrexate, stronger immunosuppressive medications for the RA,    Return to office 4 months.

## 2024-03-06 NOTE — PROGRESS NOTES
[facility]  Dr. Smith Progress Note     Subjective:   James Ramírez is a(n) 89 year old female.   Current complaints:   Chief Complaint   Patient presents with    Wrist Pain     right   History of chronic ulnar deviation of the hands without xray erosions, suggestive of Jaccoud's Arthropathy.  Has discoid lupus.    Right wrist was swollen last week, now better.    In the morning some shoulder pain.   Slight swelling right wrsit.  Fingers chronic deformity.  Using tylenol for pain.  Has not used Tramadol.     On Eliquis, can't take NSAIDs.     Has had PT for her hands.    Objective:   /70 (BP Location: Right arm, Patient Position: Sitting, Cuff Size: adult)   Pulse 52   Temp (!) 96.4 °F (35.8 °C)   Ht 5' 1\" (1.549 m)   Wt 136 lb (61.7 kg)   SpO2 99%   BMI 25.70 kg/m²   Discolored skin in neck and  chest   Hands ulnar deviation 3+  Decreased hand .   1/8/24  CRP  negative    Rheumatoid factor 46  ABRIL   posiitve  SSA AB > 240 units   SSB AB  > 320 units.  Sed rate 37  Assessment:     Encounter Diagnoses   Name Primary?    Discoid lupus Yes    Jaccoud's arthropathy (HCC)     Rheumatoid factor positive     Ulnar deviation of fingers of both hands     Positive ABRIL (antinuclear antibody)      Plan:     Patient Instructions   Current plan - ES Tylenol 500 mg - 1-2 at a time up to 4-6 day.  No Ibuprofen or Aleve, due to use of Eliquis your blood thinner.  Your tests show that you have discoid lupus, positive lupus tests and a slightly  elevated rheumatoid test of 46, normal is < 15.  Therefore you have mild rheumatoid arthritis activity.  Continue sunblocker and hydroquinone for the discoid lupus skin lesions.  Also use moisturizing lotions like Cereve or Aquaphor.  If the arthritis worsens we could consider use of Plaquenil or methotrexate, stronger immunosuppressive medications for the RA,    Return to office 4 months.         Jose Smith MD 3/6/2024 12:32 PM

## 2024-05-08 ENCOUNTER — OFFICE VISIT (OUTPATIENT)
Dept: FAMILY MEDICINE CLINIC | Facility: CLINIC | Age: 89
End: 2024-05-08

## 2024-05-08 ENCOUNTER — LAB ENCOUNTER (OUTPATIENT)
Dept: LAB | Age: 89
End: 2024-05-08
Attending: FAMILY MEDICINE
Payer: MEDICARE

## 2024-05-08 VITALS
SYSTOLIC BLOOD PRESSURE: 120 MMHG | OXYGEN SATURATION: 98 % | TEMPERATURE: 97 F | DIASTOLIC BLOOD PRESSURE: 68 MMHG | HEART RATE: 50 BPM | HEIGHT: 61 IN | BODY MASS INDEX: 26.24 KG/M2 | RESPIRATION RATE: 21 BRPM | WEIGHT: 139 LBS

## 2024-05-08 DIAGNOSIS — M12.00 JACCOUD'S ARTHROPATHY (HCC): ICD-10-CM

## 2024-05-08 DIAGNOSIS — E55.9 VITAMIN D DEFICIENCY: ICD-10-CM

## 2024-05-08 DIAGNOSIS — Z13.29 SCREENING FOR THYROID DISORDER: ICD-10-CM

## 2024-05-08 DIAGNOSIS — Z13.0 SCREENING FOR DEFICIENCY ANEMIA: ICD-10-CM

## 2024-05-08 DIAGNOSIS — L93.0 DISCOID LUPUS: ICD-10-CM

## 2024-05-08 DIAGNOSIS — Z79.01 CHRONIC ANTICOAGULATION: ICD-10-CM

## 2024-05-08 DIAGNOSIS — I10 ESSENTIAL HYPERTENSION: ICD-10-CM

## 2024-05-08 DIAGNOSIS — D51.8 MACROCYTIC ANEMIA WITH VITAMIN B12 DEFICIENCY: ICD-10-CM

## 2024-05-08 DIAGNOSIS — I70.0 THORACIC AORTA ATHEROSCLEROSIS (HCC): ICD-10-CM

## 2024-05-08 DIAGNOSIS — I27.20 PULMONARY HYPERTENSION, MODERATE TO SEVERE (HCC): ICD-10-CM

## 2024-05-08 DIAGNOSIS — I48.0 PAROXYSMAL ATRIAL FIBRILLATION (HCC): ICD-10-CM

## 2024-05-08 DIAGNOSIS — E04.2 MULTIPLE THYROID NODULES: ICD-10-CM

## 2024-05-08 DIAGNOSIS — N18.31 CHRONIC KIDNEY DISEASE, STAGE 3A (HCC): ICD-10-CM

## 2024-05-08 DIAGNOSIS — E78.00 PURE HYPERCHOLESTEROLEMIA: ICD-10-CM

## 2024-05-08 DIAGNOSIS — I69.398 VISUAL FIELD DEFECT DUE TO AND NOT CONCURRENT WITH CEREBROVASCULAR ACCIDENT (CVA): ICD-10-CM

## 2024-05-08 DIAGNOSIS — Z78.0 POSTMENOPAUSAL: ICD-10-CM

## 2024-05-08 DIAGNOSIS — M81.0 AGE-RELATED OSTEOPOROSIS WITHOUT CURRENT PATHOLOGICAL FRACTURE: ICD-10-CM

## 2024-05-08 DIAGNOSIS — D68.69 OTHER THROMBOPHILIA (HCC): ICD-10-CM

## 2024-05-08 DIAGNOSIS — H53.40 VISUAL FIELD DEFECT DUE TO AND NOT CONCURRENT WITH CEREBROVASCULAR ACCIDENT (CVA): ICD-10-CM

## 2024-05-08 DIAGNOSIS — Z00.00 ENCOUNTER FOR ANNUAL HEALTH EXAMINATION: Primary | ICD-10-CM

## 2024-05-08 DIAGNOSIS — J44.89 ASTHMA WITH COPD (CHRONIC OBSTRUCTIVE PULMONARY DISEASE) (HCC): Chronic | ICD-10-CM

## 2024-05-08 DIAGNOSIS — N60.92 ATYPICAL DUCTAL HYPERPLASIA OF LEFT BREAST: ICD-10-CM

## 2024-05-08 PROBLEM — E78.5 HYPERLIPIDEMIA: Status: ACTIVE | Noted: 2021-10-05

## 2024-05-08 LAB
ALBUMIN SERPL-MCNC: 3.6 G/DL (ref 3.4–5)
ALBUMIN/GLOB SERPL: 0.9 {RATIO} (ref 1–2)
ALP LIVER SERPL-CCNC: 92 U/L
ALT SERPL-CCNC: 21 U/L
ANION GAP SERPL CALC-SCNC: 10 MMOL/L (ref 0–18)
AST SERPL-CCNC: 33 U/L (ref 15–37)
BASOPHILS # BLD AUTO: 0.02 X10(3) UL (ref 0–0.2)
BASOPHILS NFR BLD AUTO: 0.3 %
BILIRUB SERPL-MCNC: 1.8 MG/DL (ref 0.1–2)
BUN BLD-MCNC: 12 MG/DL (ref 9–23)
CALCIUM BLD-MCNC: 9 MG/DL (ref 8.5–10.1)
CHLORIDE SERPL-SCNC: 89 MMOL/L (ref 98–112)
CHOLEST SERPL-MCNC: 163 MG/DL (ref ?–200)
CO2 SERPL-SCNC: 23 MMOL/L (ref 21–32)
CREAT BLD-MCNC: 0.86 MG/DL
EGFRCR SERPLBLD CKD-EPI 2021: 65 ML/MIN/1.73M2 (ref 60–?)
EOSINOPHIL # BLD AUTO: 0.02 X10(3) UL (ref 0–0.7)
EOSINOPHIL NFR BLD AUTO: 0.3 %
ERYTHROCYTE [DISTWIDTH] IN BLOOD BY AUTOMATED COUNT: 11.4 %
FASTING PATIENT LIPID ANSWER: YES
FASTING STATUS PATIENT QL REPORTED: YES
GLOBULIN PLAS-MCNC: 4 G/DL (ref 2.8–4.4)
GLUCOSE BLD-MCNC: 107 MG/DL (ref 70–99)
HCT VFR BLD AUTO: 33.7 %
HDLC SERPL-MCNC: 116 MG/DL (ref 40–59)
HGB BLD-MCNC: 11.8 G/DL
IMM GRANULOCYTES # BLD AUTO: 0.02 X10(3) UL (ref 0–1)
IMM GRANULOCYTES NFR BLD: 0.3 %
LDLC SERPL CALC-MCNC: 35 MG/DL (ref ?–100)
LYMPHOCYTES # BLD AUTO: 0.7 X10(3) UL (ref 1–4)
LYMPHOCYTES NFR BLD AUTO: 10.7 %
MCH RBC QN AUTO: 33 PG (ref 26–34)
MCHC RBC AUTO-ENTMCNC: 35 G/DL (ref 31–37)
MCV RBC AUTO: 94.1 FL
MONOCYTES # BLD AUTO: 0.44 X10(3) UL (ref 0.1–1)
MONOCYTES NFR BLD AUTO: 6.7 %
NEUTROPHILS # BLD AUTO: 5.34 X10 (3) UL (ref 1.5–7.7)
NEUTROPHILS # BLD AUTO: 5.34 X10(3) UL (ref 1.5–7.7)
NEUTROPHILS NFR BLD AUTO: 81.7 %
NONHDLC SERPL-MCNC: 47 MG/DL (ref ?–130)
OSMOLALITY SERPL CALC.SUM OF ELEC: 254 MOSM/KG (ref 275–295)
PLATELET # BLD AUTO: 189 10(3)UL (ref 150–450)
POTASSIUM SERPL-SCNC: 3.9 MMOL/L (ref 3.5–5.1)
PROT SERPL-MCNC: 7.6 G/DL (ref 6.4–8.2)
RBC # BLD AUTO: 3.58 X10(6)UL
SODIUM SERPL-SCNC: 122 MMOL/L (ref 136–145)
TRIGL SERPL-MCNC: 61 MG/DL (ref 30–149)
TSI SER-ACNC: 1.09 MIU/ML (ref 0.36–3.74)
VIT B12 SERPL-MCNC: 340 PG/ML (ref 193–986)
VIT D+METAB SERPL-MCNC: 22.6 NG/ML (ref 30–100)
VLDLC SERPL CALC-MCNC: 8 MG/DL (ref 0–30)
WBC # BLD AUTO: 6.5 X10(3) UL (ref 4–11)

## 2024-05-08 PROCEDURE — G0439 PPPS, SUBSEQ VISIT: HCPCS | Performed by: FAMILY MEDICINE

## 2024-05-08 PROCEDURE — 96160 PT-FOCUSED HLTH RISK ASSMT: CPT | Performed by: FAMILY MEDICINE

## 2024-05-08 PROCEDURE — 82607 VITAMIN B-12: CPT

## 2024-05-08 PROCEDURE — 80053 COMPREHEN METABOLIC PANEL: CPT

## 2024-05-08 PROCEDURE — 80061 LIPID PANEL: CPT

## 2024-05-08 PROCEDURE — 36415 COLL VENOUS BLD VENIPUNCTURE: CPT

## 2024-05-08 PROCEDURE — 84443 ASSAY THYROID STIM HORMONE: CPT

## 2024-05-08 PROCEDURE — 82306 VITAMIN D 25 HYDROXY: CPT

## 2024-05-08 PROCEDURE — 85025 COMPLETE CBC W/AUTO DIFF WBC: CPT

## 2024-05-08 PROCEDURE — 99214 OFFICE O/P EST MOD 30 MIN: CPT | Performed by: FAMILY MEDICINE

## 2024-05-08 PROCEDURE — 99499 UNLISTED E&M SERVICE: CPT | Performed by: FAMILY MEDICINE

## 2024-05-08 RX ORDER — LOSARTAN POTASSIUM 100 MG/1
100 TABLET ORAL DAILY
Qty: 90 TABLET | Refills: 1 | Status: SHIPPED | OUTPATIENT
Start: 2024-05-08

## 2024-05-08 RX ORDER — ALBUTEROL SULFATE 90 UG/1
2 AEROSOL, METERED RESPIRATORY (INHALATION) EVERY 6 HOURS PRN
Qty: 1 EACH | Refills: 3 | Status: SHIPPED | OUTPATIENT
Start: 2024-05-08

## 2024-05-08 RX ORDER — LABETALOL 300 MG/1
300 TABLET, FILM COATED ORAL 2 TIMES DAILY
Qty: 180 TABLET | Refills: 1 | Status: SHIPPED | OUTPATIENT
Start: 2024-05-08

## 2024-05-08 RX ORDER — ATORVASTATIN CALCIUM 40 MG/1
40 TABLET, FILM COATED ORAL NIGHTLY
Qty: 90 TABLET | Refills: 1 | Status: SHIPPED | OUTPATIENT
Start: 2024-05-08

## 2024-05-08 NOTE — PROGRESS NOTES
Chief Complaint   Patient presents with    Wellness Visit     MA supervisit     Subjective:   James Ramírez is a 89 year old female who presents for a MA (Medicare Advantage) Supervisit (Once per calendar year) and scheduled follow up of multiple significant but stable problems and acute exacerbation of a chronic illness.   Mammogram done in 2022 was normal, no family history of breast cancer  Due for DEXA.  Denies any constipation or blood in stool, all previous colonoscopies were normal.    Compliant with her cholesterol medication and diet, tolerating medication well without any side effects.    Compliant with her blood pressure medication and low salt diet. Tolerating medications well without any side effects. Takes hydrochlorothiazide 3 times a week for swelling in her legs and it helps.    Did follow up with her cardiologist, has Carotid US done and stable findings in the right carotid. Was advised to continue the same regimen of medications and will follow up with  in 6 months from her last visit in February.    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      Cognitive Assessment:   She had a completely normal cognitive assessment - see flowsheet entries     Functional Ability/Status:   James Ramírez has some abnormal functions as listed below:  She has Vision problems based on screening of functional status. She has Walking problems based on screening of functional status.       Depression Screening (PHQ-2/PHQ-9): PHQ-2 SCORE: 0  , done 5/3/2024             Advanced Directives:   She does NOT have a Living Will. [Do you have a living will?: Yes]    She has a Power of  for Health Care on file in [facility].  Discussed Advance Care Planning with patient (and family/surrogate if present). Standard forms made available to patient in After Visit Summary.      Patient Active Problem List    Diagnosis    Cataract    Status post total replacement of right hip    Occlusion of right carotid artery    Atrial fibrillation (HCC)    Essential hypertension    Chronic anticoagulation    H/O ischemic right PCA stroke    Macrocytic anemia with vitamin B12 deficiency    Thoracic aorta atherosclerosis (HCC)    Positive ABRIL (antinuclear antibody)    Discoid lupus    Pulmonary hypertension, moderate to severe (HCC)    Osteoporosis without current pathological fracture    Atypical ductal hyperplasia of left breast    Asthma with COPD (chronic obstructive pulmonary disease) (HCC)    Localized osteoarthritis of left knee    Visual field defect due to and not concurrent with cerebrovascular accident (CVA)    Seborrheic keratoses, inflamed    Stenosis of right carotid artery    Chronic kidney disease, stage 3a (HCC)    Other thrombophilia (HCC)    Multiple thyroid nodules    Ulnar deviation of fingers of both hands    Jaccoud's arthropathy (HCC)    Rheumatoid factor positive    Hyperlipidemia     Allergies:  She is allergic to thiazide-type diuretics, no known allergies, ace inhibitors, adhesive tape, amlodipine, clonidine, and furosemide.    Current Medications:  Outpatient Medications Marked as Taking for the 5/8/24 encounter (Office Visit) with Ann Flowers MD   Medication Sig    apixaban (ELIQUIS) 5 MG Oral Tab Take 1 tablet (5 mg total) by mouth 2 (two) times daily.    atorvastatin 40 MG Oral Tab Take 1 tablet (40 mg total) by mouth nightly.    Labetalol HCl 300 MG Oral Tab Take 1 tablet (300 mg total) by mouth 2 (two) times daily.    losartan 100 MG Oral Tab Take 1 tablet (100 mg total) by mouth daily.    albuterol 108 (90 Base) MCG/ACT Inhalation Aero Soln Inhale 2 puffs into the lungs every 6 (six) hours as needed for Wheezing.    Hydroquinone 4 % External Cream Apply 1 Application topically daily.    traMADol 50 MG Oral Tab Take 1 tablet (50 mg total) by mouth 3 (three) times daily as needed for Pain.     METAMUCIL 28 % Oral Powd Pack Take 1 packet by mouth every morning.    Mometasone Furoate 0.1 % External Cream Apply 1 Application  topically 2 (two) times daily as needed (irritated skin lesion).    mometasone 0.1 % External Ointment APPLY TWICE DAILY TO AFFECTED AREAS OF LEGS. STOP USING WHEN THESE ARE SMOOTH TO THE TOUCH NO LONGER RAISED. 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  Surgical History:  She  has a past surgical history that includes Breast lumpectomy (Left, 11/2009); Umbilical hernia repair (N/A, 02/06/2017); hernia surgery (02/06/2017); total hip replacement (Right, 07/2000); hysterectomy (1993); oophorectomy (Bilateral, 1993); vivian localization wire 1 site left (cpt=19281) (Left, 11/2017); vivian localization wire 1 site right (cpt=19281) (Right, 1970); vivian biopsy stereo nodule 1 site left (cpt=19081) (Left, 09/2017); d & c; and hip replacement surgery (2001).   Family History:  Her family history includes Diabetes in her brother, father, and mother; Heart Disorder in her mother.  Social History:  She  reports that she quit smoking about 29 years ago. Her smoking use included cigarettes. She started smoking about 69 years ago. She has a 25 pack-year smoking history. She has been exposed to tobacco smoke. She has never used smokeless tobacco. She reports current alcohol use of about 8.0 standard  drinks of alcohol per week. She reports that she does not use drugs.    Tobacco:  She smoked tobacco in the past but quit greater than 12 months ago.  Social History     Tobacco Use   Smoking Status Former    Current packs/day: 0.00    Average packs/day: 0.6 packs/day for 45.0 years (25.0 ttl pk-yrs)    Types: Cigarettes    Start date: 1955    Quit date: 1995    Years since quittin.4    Passive exposure: Past   Smokeless Tobacco Never          CAGE Alcohol Screen:   CAGE screening score of 0 on 5/3/2024, showing low risk of alcohol abuse.      Patient Care Team:  Ann Flowers MD as PCP - General (Family Medicine)  Toni Aguilar MD (CARDIOLOGY)  Nasir Hinton DO as Consulting Physician (NEUROLOGY)  Magy Hoang PT as Physical Therapist  Josiah Wyman OT as Occupational Therapist (Occupational Therapist)  Mychart, Generic Provider    Review of Systems   Constitutional:  Negative for appetite change, fatigue, fever and unexpected weight change.   HENT:  Negative for congestion, ear pain, hearing loss and sore throat.    Eyes:  Negative for discharge, redness and visual disturbance.   Respiratory:  Negative for cough, chest tightness and shortness of breath.    Cardiovascular:  Positive for leg swelling. Negative for chest pain and palpitations.   Gastrointestinal:  Negative for abdominal pain, blood in stool, constipation and nausea.   Endocrine: Negative for cold intolerance, heat intolerance and polyuria.   Genitourinary:  Negative for difficulty urinating, dysuria, frequency and urgency.   Musculoskeletal:  Negative for arthralgias, gait problem, joint swelling and myalgias.   Skin:  Negative for rash.   Allergic/Immunologic: Negative for food allergies.   Neurological:  Negative for dizziness, weakness, numbness and headaches.   Hematological:  Negative for adenopathy. Does not bruise/bleed easily.   Psychiatric/Behavioral:  Negative for dysphoric mood and sleep disturbance. The  patient is not nervous/anxious.          Objective:   Physical Exam  Constitutional:       General: She is not in acute distress.     Appearance: Normal appearance. She is well-developed and normal weight.   HENT:      Head: Normocephalic and atraumatic.      Right Ear: Tympanic membrane, ear canal and external ear normal.      Left Ear: Tympanic membrane, ear canal and external ear normal.      Nose: Nose normal.      Mouth/Throat:      Mouth: Mucous membranes are moist.      Pharynx: Oropharynx is clear.   Eyes:      Extraocular Movements: Extraocular movements intact.      Conjunctiva/sclera: Conjunctivae normal.      Pupils: Pupils are equal, round, and reactive to light.   Neck:      Thyroid: No thyromegaly.   Cardiovascular:      Rate and Rhythm: Normal rate and regular rhythm.      Pulses: Normal pulses.      Heart sounds: Normal heart sounds. No murmur heard.  Pulmonary:      Effort: Pulmonary effort is normal. No respiratory distress.      Breath sounds: Normal breath sounds.   Chest:      Chest wall: No tenderness.   Abdominal:      General: Bowel sounds are normal. There is no distension.      Palpations: Abdomen is soft. There is no hepatomegaly, splenomegaly or mass.      Tenderness: There is no abdominal tenderness.      Hernia: No hernia is present.   Musculoskeletal:         General: Normal range of motion.      Cervical back: Normal range of motion and neck supple.      Right lower leg: No edema.      Left lower leg: No edema.      Comments: Ulnar deviation of fingers   Lymphadenopathy:      Cervical: No cervical adenopathy.   Skin:     General: Skin is warm.      Findings: No rash.   Neurological:      General: No focal deficit present.      Mental Status: She is alert and oriented to person, place, and time.      Cranial Nerves: No cranial nerve deficit.      Sensory: No sensory deficit.      Motor: No weakness.      Coordination: Coordination normal.      Gait: Gait normal.      Deep Tendon  Reflexes: Reflexes are normal and symmetric. Reflexes normal.   Psychiatric:         Mood and Affect: Mood normal.         Behavior: Behavior normal.         Thought Content: Thought content normal.         Judgment: Judgment normal.           /68   Pulse 50   Temp 97.2 °F (36.2 °C) (Temporal)   Resp 21   Ht 5' 1\" (1.549 m)   Wt 139 lb (63 kg)   SpO2 98%   BMI 26.26 kg/m²  Estimated body mass index is 26.26 kg/m² as calculated from the following:    Height as of this encounter: 5' 1\" (1.549 m).    Weight as of this encounter: 139 lb (63 kg).    Medicare Hearing Assessment:   Hearing Screening    Screening Method: Finger Rub  Finger Rub Result: Pass         Visual Acuity:   Right Eye Visual Acuity: Uncorrected Right Eye Chart Acuity: 20/25   Left Eye Visual Acuity: Uncorrected Left Eye Chart Acuity: 20/25   Both Eyes Visual Acuity: Uncorrected Both Eyes Chart Acuity: 20/25   Able To Tolerate Visual Acuity: Yes        Assessment & Plan:   James Ramírez is a 89 year old female who presents for a Medicare Assessment.     1. Encounter for annual health examination (Primary)  2. Chronic anticoagulation  -     Apixaban; Take 1 tablet (5 mg total) by mouth 2 (two) times daily.  Dispense: 180 tablet; Refill: 1  -     Detailed, Mod Complex (16836)  3. Paroxysmal atrial fibrillation (HCC) rate controlled  -     Apixaban; Take 1 tablet (5 mg total) by mouth 2 (two) times daily.  Dispense: 180 tablet; Refill: 1  -     Detailed, Mod Complex (39694)  -     continue DUAC    4. Pure hypercholesterolemia controlled  -     Atorvastatin Calcium; Take 1 tablet (40 mg total) by mouth nightly.  Dispense: 90 tablet; Refill: 1  -     Lipid Panel; Future; Expected date: 05/08/2024  -     Detailed, Mod Complex (19201)  -     continue the same dose of medication  -     limit saturated fats and cholesterol in diet    5. Essential hypertension controlled  Overview:  Since her 30s  Orders:  -     Labetalol HCl; Take 1 tablet (300 mg  total) by mouth 2 (two) times daily.  Dispense: 180 tablet; Refill: 1  -     Losartan Potassium; Take 1 tablet (100 mg total) by mouth daily.  Dispense: 90 tablet; Refill: 1  -     Comp Metabolic Panel (14); Future; Expected date: 05/08/2024  -     Detailed, Mod Complex (09870)  -     continue the same dose of medication  -     limit salt I diet to less than 1000 mg  -     Goal BP less than 130/80    6. Asthma with COPD (chronic obstructive pulmonary disease) (HCC) controlled  -     Detailed, Mod Complex (12667)  -     Albuterol Sulfate HFA; Inhale 2 puffs into the lungs every 6 (six) hours as needed for Wheezing.  Dispense: 1 each; Refill: 3  -      continue using albuterol prn.    7. Macrocytic anemia with vitamin B12 deficiency stable  -     CBC With Differential With Platelet; Future; Expected date: 05/08/2024  -     Vitamin B12; Future; Expected date: 05/08/2024  -     Detailed, Mod Complex (75480)  -     B12 level has been stable  -       8. Thoracic aorta atherosclerosis (HCC) stable, asymptomatic  Overview:  On CXR 1/17  Continue follow up and surveillance with cardio.    9. Pulmonary hypertension, moderate to severe (HCC) stable  Overview:  Occurred in January 2017.  -cpm    10. Age-related osteoporosis without current pathological fracture  Overview:  On DEXA 2017. Discuss alendronate at future visit.   -    patient is still resistant to starting bisphosphonate, advised to reconsider and let me know if she would like to start so I can send the prescription  -    continue Calcium, vitamin d and resistance exercises.    11. Discoid lupus        -  symptoms are stable        -  continue to f/u with rheumatologist and dermatologist.    14. Chronic kidney disease, stage 3a (HCC)       -likely secondary to HTN       - encouraged good diabetes control    15. Other thrombophilia (HCC)       - continue DUAC    16. Multiple thyroid nodules stable           - will continue to monitor    17. Jaccoud's arthropathy  (HCC)       - pain controlled       - f/u with rheumatologist    18. Postmenopausal  -     Dexa Scan/Bone Density screening (Screening allowed every 2 years); Future; Expected date: 05/08/2024    19. Vitamin D deficiency  -     Vitamin D; Future; Expected date: 05/08/2024    20. Screening for thyroid disorder  -     Assay, Thyroid Stim Hormone; Future; Expected date: 05/08/2024    The patient indicates understanding of these issues and agrees to the plan.        Return in 6 months (on 11/8/2024).     Ann Flowers MD, 5/8/2024     Supplementary Documentation:   General Health:  In the past six months, have you lost more than 10 pounds without trying?: 2 - No  Has your appetite been poor?: No  How does the patient maintain a good energy level?: Appropriate Exercise  How would you describe your daily physical activity?: Moderate  How do you maintain positive mental well-being?: Social Interaction;Visiting Friends;Visiting Family  On a scale of 0 to 10, with 0 being no pain and 10 being severe pain, what is your pain level?: 5 - (Moderate)  In the past six months, have you experienced urine leakage?: 0-No  Have you had any immunizations at another office such as Influenza, Hepatitis B, Tetanus, or Pneumococcal?: Yes         James Ramírez's SCREENING SCHEDULE   Tests on this list are recommended by your physician but may not be covered, or covered at this frequency, by your insurer.   Please check with your insurance carrier before scheduling to verify coverage.   PREVENTATIVE SERVICES FREQUENCY &  COVERAGE DETAILS LAST COMPLETION DATE   Diabetes Screening    Fasting Blood Sugar /  Glucose    One screening every 12 months if never tested or if previously tested but not diagnosed with pre-diabetes   One screening every 6 months if diagnosed with pre-diabetes Lab Results   Component Value Date     (H) 05/08/2024        Cardiovascular Disease Screening    Lipid Panel  Cholesterol  Lipoprotein  (HDL)  Triglycerides Covered every 5 years for all Medicare beneficiaries without apparent signs or symptoms of cardiovascular disease Lab Results   Component Value Date    CHOLEST 163 05/08/2024     (H) 05/08/2024    LDL 35 05/08/2024    TRIG 61 05/08/2024         Electrocardiogram (EKG)   Covered if needed at Welcome to Medicare, and non-screening if indicated for medical reasons 09/14/2021      Ultrasound Screening for Abdominal Aortic Aneurysm (AAA) Covered once in a lifetime for one of the following risk factors    Men who are 65-75 years old and have ever smoked    Anyone with a family history -     Colorectal Cancer Screening  Covered for ages 50-85; only need ONE of the following:    Colonoscopy   Covered every 10 years    Covered every 2 years if patient is at high risk or previous colonoscopy was abnormal -    No recommendations at this time    Flexible Sigmoidoscopy   Covered every 4 years -    Fecal Occult Blood Test Covered annually -   Bone Density Screening    Bone density screening    Covered every 2 years after age 65 if diagnosed with risk of osteoporosis or estrogen deficiency.    Covered yearly for long-term glucocorticoid medication use (Steroids) Last Dexa Scan:    XR DEXA BONE DENSITOMETRY (CPT=77080) 08/11/2022      No recommendations at this time   Pap and Pelvic    Pap   Covered every 2 years for women at normal risk; Annually if at high risk -  No recommendations at this time    Chlamydia Annually if high risk -  No recommendations at this time   Screening Mammogram    Mammogram     Recommend annually for all female patients aged 40 and older    One baseline mammogram covered for patients aged 35-39 08/11/2022    No recommendations at this time    Immunizations    Influenza Covered once per flu season  Please get every year -  No recommendations at this time    Pneumococcal Each vaccine ([id number] & [id number]) covered once after 65 Prevnar 13: 07/13/2016    [id number]: 01/01/2009      No recommendations at this time    Hepatitis B One screening covered for patients with certain risk factors   -  No recommendations at this time    Tetanus Toxoid Not covered by Medicare Part B unless medically necessary (cut with metal); may be covered with your pharmacy prescription benefits -    Tetanus, Diptheria and Pertusis TD and TDaP Not covered by Medicare Part B -  No recommendations at this time    Zoster Not covered by Medicare Part B; may be covered with your pharmacy  prescription benefits 01/10/2012  Zoster Vaccines(2 of 3) due on 03/06/2012     Annual Monitoring of Persistent Medications (ACE/ARB, digoxin diuretics, anticonvulsants)    Potassium Annually Lab Results   Component Value Date    K 3.9 05/08/2024         Creatinine   Annually Lab Results   Component Value Date    CREATSERUM 0.86 05/08/2024         BUN Annually Lab Results   Component Value Date    BUN 12 05/08/2024       Drug Serum Conc Annually No results found for: \"DIGOXIN\", \"DIG\", \"VALP\"

## 2024-05-08 NOTE — PATIENT INSTRUCTIONS
James Ramírez's SCREENING SCHEDULE   Tests on this list are recommended by your physician but may not be covered, or covered at this frequency, by your insurer.   Please check with your insurance carrier before scheduling to verify coverage.   PREVENTATIVE SERVICES FREQUENCY &  COVERAGE DETAILS LAST COMPLETION DATE   Diabetes Screening    Fasting Blood Sugar /  Glucose    One screening every 12 months if never tested or if previously tested but not diagnosed with pre-diabetes   One screening every 6 months if diagnosed with pre-diabetes Lab Results   Component Value Date    GLU 92 07/06/2023        Cardiovascular Disease Screening    Lipid Panel  Cholesterol  Lipoprotein (HDL)  Triglycerides Covered every 5 years for all Medicare beneficiaries without apparent signs or symptoms of cardiovascular disease Lab Results   Component Value Date    CHOLEST 152 05/03/2023     (H) 05/03/2023    LDL 43 05/03/2023    TRIG 37 05/03/2023         Electrocardiogram (EKG)   Covered if needed at Welcome to Medicare, and non-screening if indicated for medical reasons 09/14/2021      Ultrasound Screening for Abdominal Aortic Aneurysm (AAA) Covered once in a lifetime for one of the following risk factors    Men who are 65-75 years old and have ever smoked    Anyone with a family history -     Colorectal Cancer Screening  Covered for ages 50-85; only need ONE of the following:    Colonoscopy   Covered every 10 years    Covered every 2 years if patient is at high risk or previous colonoscopy was abnormal -    No recommendations at this time    Flexible Sigmoidoscopy   Covered every 4 years -    Fecal Occult Blood Test Covered annually -   Bone Density Screening    Bone density screening    Covered every 2 years after age 65 if diagnosed with risk of osteoporosis or estrogen deficiency.    Covered yearly for long-term glucocorticoid medication use (Steroids) Last Dexa Scan:    XR DEXA BONE DENSITOMETRY (CPT=77080) 08/11/2022      No  recommendations at this time   Pap and Pelvic    Pap   Covered every 2 years for women at normal risk; Annually if at high risk -  No recommendations at this time    Chlamydia Annually if high risk -  No recommendations at this time   Screening Mammogram    Mammogram     Recommend annually for all female patients aged 40 and older    One baseline mammogram covered for patients aged 35-39 08/11/2022    No recommendations at this time    Immunizations    Influenza Covered once per flu season  Please get every year -  No recommendations at this time    Pneumococcal Each vaccine ([id number] & [id number]) covered once after 65 Prevnar 13: 07/13/2016    [id number]: 01/01/2009     No recommendations at this time    Hepatitis B One screening covered for patients with certain risk factors   -  No recommendations at this time    Tetanus Toxoid Not covered by Medicare Part B unless medically necessary (cut with metal); may be covered with your pharmacy prescription benefits -    Tetanus, Diptheria and Pertusis TD and TDaP Not covered by Medicare Part B -  No recommendations at this time    Zoster Not covered by Medicare Part B; may be covered with your pharmacy  prescription benefits 01/10/2012  Zoster Vaccines(2 of 3) due on 03/06/2012     Annual Monitoring of Persistent Medications (ACE/ARB, digoxin diuretics, anticonvulsants)    Potassium Annually Lab Results   Component Value Date    K 4.3 07/06/2023         Creatinine   Annually Lab Results   Component Value Date    CREATSERUM 0.97 07/06/2023         BUN Annually Lab Results   Component Value Date    BUN 10 07/06/2023       Drug Serum Conc Annually No results found for: \"DIGOXIN\", \"DIG\", \"VALP\"       Take over the counter Claritin 10 mg or Zyrtec 10 mg daily to see if it helps with your cough, ok to use your inhaler as needed.    Please monitor and see if there is bleeding from your nose or mouth. Follow up if it happens again.

## 2024-05-23 PROBLEM — N60.92 ATYPICAL DUCTAL HYPERPLASIA OF LEFT BREAST: Status: RESOLVED | Noted: 2017-10-03 | Resolved: 2024-05-23

## 2024-05-23 PROBLEM — H53.40: Status: RESOLVED | Noted: 2020-06-26 | Resolved: 2024-05-23

## 2024-05-23 PROBLEM — I69.398: Status: RESOLVED | Noted: 2020-06-26 | Resolved: 2024-05-23

## 2024-05-31 ENCOUNTER — LAB ENCOUNTER (OUTPATIENT)
Dept: LAB | Age: 89
End: 2024-05-31
Attending: FAMILY MEDICINE
Payer: MEDICARE

## 2024-05-31 DIAGNOSIS — D50.9 IRON DEFICIENCY ANEMIA, UNSPECIFIED IRON DEFICIENCY ANEMIA TYPE: ICD-10-CM

## 2024-05-31 LAB
DEPRECATED HBV CORE AB SER IA-ACNC: 109.3 NG/ML
IRON SATN MFR SERPL: 22 %
IRON SERPL-MCNC: 64 UG/DL
TIBC SERPL-MCNC: 292 UG/DL (ref 240–450)
TRANSFERRIN SERPL-MCNC: 196 MG/DL (ref 200–360)

## 2024-05-31 PROCEDURE — 36415 COLL VENOUS BLD VENIPUNCTURE: CPT

## 2024-05-31 PROCEDURE — 82728 ASSAY OF FERRITIN: CPT

## 2024-05-31 PROCEDURE — 83540 ASSAY OF IRON: CPT

## 2024-05-31 PROCEDURE — 83550 IRON BINDING TEST: CPT

## 2024-06-04 ENCOUNTER — TELEPHONE (OUTPATIENT)
Dept: INTERNAL MEDICINE CLINIC | Facility: CLINIC | Age: 89
End: 2024-06-04

## 2024-06-04 NOTE — TELEPHONE ENCOUNTER
LOV: 5/8/24    Pt called, she states she has history of abd hernia which she had repaired in 2017. She states she now has a \"bulge\" above her naval. She thinks its either the hernia has recurred or scar tissue. She denies any pain, redness, or tenderness. OV scheduled for 6/10/24    HOLLAND Mcintosh

## 2024-06-10 ENCOUNTER — OFFICE VISIT (OUTPATIENT)
Dept: FAMILY MEDICINE CLINIC | Facility: CLINIC | Age: 89
End: 2024-06-10
Payer: MEDICARE

## 2024-06-10 VITALS
OXYGEN SATURATION: 99 % | HEIGHT: 61 IN | BODY MASS INDEX: 25.68 KG/M2 | DIASTOLIC BLOOD PRESSURE: 80 MMHG | RESPIRATION RATE: 16 BRPM | SYSTOLIC BLOOD PRESSURE: 134 MMHG | HEART RATE: 56 BPM | TEMPERATURE: 98 F | WEIGHT: 136 LBS

## 2024-06-10 DIAGNOSIS — K43.9 VENTRAL HERNIA WITHOUT OBSTRUCTION OR GANGRENE: Primary | ICD-10-CM

## 2024-06-10 PROCEDURE — 99213 OFFICE O/P EST LOW 20 MIN: CPT | Performed by: FAMILY MEDICINE

## 2024-06-10 NOTE — PROGRESS NOTES
Family Medicine Progress Note  Assessment & Plan:   James Ramírez is a 89 year old female who is here for:     1. Ventral hernia without obstruction or gangrene- pt with ventral wall hernia, reducible.  No notable pain.   - discussed conservative mgmt, but also did discuss follow-up with Dr. Vaughn to determine if any additional POC.   - Surgery Referral - [facility] ([place])             Follow-Up: Return for as needed.      Angeles Mcintosh DO   06/10/24      CC: Umbilical Hernia (Removed 2017 . )    Subjective:    History of Present Illness:  History obtained from patient.     James Ramírez is a 89 year old female who presents for Umbilical Hernia (Removed 2017 . )      Has history of Umbilical hernia which was repaired in 2017.  No complications.  Recently noticed a bulge in the abd midline. Takes metamucil to avoid consitpation. No pain.     History/Other:   ROS-Per HPI     Problem List:  Patient Active Problem List   Diagnosis    Cataract    Status post total replacement of right hip    Occlusion of right carotid artery    Atrial fibrillation (HCC)    Essential hypertension    Chronic anticoagulation    H/O ischemic right PCA stroke    Macrocytic anemia with vitamin B12 deficiency    Thoracic aorta atherosclerosis (HCC)    Positive ABRIL (antinuclear antibody)    Discoid lupus    Pulmonary hypertension, moderate to severe (HCC)    Osteoporosis without current pathological fracture    Asthma with COPD (chronic obstructive pulmonary disease) (HCC)    Localized osteoarthritis of left knee    Seborrheic keratoses, inflamed    Stenosis of right carotid artery    Chronic kidney disease, stage 3a (HCC)    Other thrombophilia (HCC)    Multiple thyroid nodules    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 12 capsule 0    apixaban (ELIQUIS) 5 MG Oral Tab Take 1 tablet (5 mg total) by mouth 2 (two) times daily. 180 tablet 1    atorvastatin 40 MG Oral Tab Take 1 tablet (40 mg total) by mouth nightly. 90 tablet 1    Labetalol HCl 300 MG Oral Tab Take 1 tablet (300 mg total) by mouth 2 (two) times daily. 180 tablet 1    losartan 100 MG Oral Tab Take 1 tablet (100 mg total) by mouth daily. 90 tablet 1    albuterol 108 (90 Base) MCG/ACT Inhalation Aero Soln Inhale 2 puffs into the lungs every 6 (six) hours as needed for Wheezing. 1 each 3    Hydroquinone 4 % External Cream Apply 1 Application topically daily. 30 g 2    traMADol 50 MG Oral Tab Take 1 tablet (50 mg total) by mouth 3 (three) times daily as needed for Pain. 30 tablet 1    METAMUCIL 28 % Oral Powd Pack Take 1 packet by mouth every morning.      Mometasone Furoate 0.1 % External Cream Apply 1 Application  topically 2 (two) times daily as needed (irritated skin lesion). 15 g 0    Cholecalciferol (VITAMIN D3 OR) Take by mouth.      mometasone 0.1 % External Ointment APPLY TWICE DAILY TO AFFECTED AREAS OF LEGS. STOP USING WHEN THESE ARE SMOOTH TO THE TOUCH NO LONGER RAISED. DO NOT USE FOR MORE THAN 1 JAMIR      Vitamin B-12 1000 MCG Oral Tab Take 1 tablet (1,000 mcg total) by mouth daily. 90 tablet 1      Past Medical History:  Past Medical History:    Acute, but ill-defined, cerebrovascular disease    Arrhythmia    DX AIFIB 1/2017    Arthritis    Atypical chest pain    Cancer (HCC)    Cataract    Cutaneous lupus erythematosus    + ABRIL 1:320 titer 2/13    Endometrial cancer (HCC)    Fibrocystic breast changes    Hypertension    Hyponatremia    Localized, secondary osteoarthritis of the lower leg    Mild intermittent asthma without complication (HCC)    Osteoarthritis    Osteoarthritis of hip    Pneumonia due to organism    Stroke (HCC)    no residual effects    Tibial fracture    Unspecified essential hypertension     office BP runs higher than home.      Past Surgical History:  Past Surgical History:   Procedure Laterality Date    Breast lumpectomy Left 2009    benign fibroadenoma    D & c      Hernia surgery  2017    Hip replacement surgery      Hysterectomy      total hystero.    Ya biopsy stereo nodule 1 site left (cpt=19081) Left 2017    left exc to follow    Ya localization wire 1 site left (cpt=19281) Left 2017    benign-ADH    Ya localization wire 1 site right (cpt=19281) Right 1970    benign tumor    Oophorectomy Bilateral     total hystero    Total hip replacement Right 2000    at [facility]    Umbilical hernia repair N/A 2017    Procedure: HERNIA UMBILICAL REPAIR ADULT;  Surgeon: Treasure Vaughn MD;  Location:  MAIN OR      Family History:  Family History   Problem Relation Age of Onset    Diabetes Father     Diabetes Mother         rheumatic heart disease    Heart Disorder Mother     Diabetes Brother       Social History:  Social History     Socioeconomic History    Marital status:     Number of children: 0   Occupational History    Occupation: Retired      Comment: retired 1994 from [email or web address]    Occupation: Taught nursery school   Tobacco Use    Smoking status: Former     Current packs/day: 0.00     Average packs/day: 0.6 packs/day for 45.0 years (25.0 ttl pk-yrs)     Types: Cigarettes     Start date: 1955     Quit date: 1995     Years since quittin.4     Passive exposure: Past    Smokeless tobacco: Never   Vaping Use    Vaping status: Never Used   Substance and Sexual Activity    Alcohol use: Yes     Alcohol/week: 8.0 standard drinks of alcohol     Types: 2 Glasses of wine, 6 Shots of liquor per week     Comment: 4x weekly Rum     Drug use: Never    Sexual activity: Yes     Partners: Male   Other Topics Concern    Blood Transfusions Yes     Comment: 14 yr ago with hip replacement (own blood)    Caffeine Concern No    Occupational  Exposure No    Hobby Hazards No    Sleep Concern No    Stress Concern No    Weight Concern No    Special Diet No    Exercise Yes    Seat Belt Yes   Social History Narrative    Lives with , recent move to [place]. No children. No pets. No Alevism affiliation     Social Determinants of Health     Physical Activity: Inactive (10/13/2020)    [facility]    Exercise Vital Sign     Days of Exercise per Week: 0 days     Minutes of Exercise per Session: 0 min    [facility]    Social Connections    [facility]    Housing Stability       Allergies:  Allergies   Allergen Reactions    Thiazide-Type Diuretics OTHER (SEE COMMENTS)     Hyponatremia  hyponatremia  Hyponatremia    No Known Allergies ITCHING     redness    Ace Inhibitors Coughing    Adhesive Tape ITCHING     redness    Amlodipine SWELLING     Swelling of ankles    Clonidine DIZZINESS    Furosemide OTHER (SEE COMMENTS)     Severe hyponatremia requiring hospitalization  hyponatremia        Objective:    VITALS: /80   Pulse 56   Temp 98 °F (36.7 °C) (Temporal)   Resp 16   Ht 5' 1\" (1.549 m)   Wt 136 lb (61.7 kg)   SpO2 99%   BMI 25.70 kg/m²      BP Readings from Last 3 Encounters:   06/10/24 134/80   05/08/24 120/68   03/06/24 120/70     Wt Readings from Last 3 Encounters:   06/10/24 136 lb (61.7 kg)   05/08/24 139 lb (63 kg)   03/06/24 136 lb (61.7 kg)         PHYSICAL EXAM  GEN: pleasant, well-appearing in NAD  SKIN: no visible rashes, lesions, or evidence of trauma  HEENT:  no conjunctivitis or scleral icterus; mmm  Abd:  soft bulge superior to the umbilicus, non-tender, reducible.   PULM: breathing comfortably on room air  MSK: moving all extremities well, no weakness or joint tenderness  NEURO: CNs grossly intact, no focal weakness, alert and oriented                 Angeles Mcintosh, DO    NOTE TO PATIENT: The 21st Century Cures Act makes clinical notes like these available to patients in the interest of transparency. Clinical notes are medical documents used by physicians and care providers to communicate with each other. These documents include medical language and terminology, abbreviations, and treatment information that may sound technical and at times possibly unfamiliar. In addition, at times, the verbiage may appear blunt or direct. These documents are one tool providers use to communicate relevant information and clinical opinions of the care providers in a way that allows common understanding of the clinical context.

## 2024-06-12 DIAGNOSIS — L93.0 DISCOID LUPUS: ICD-10-CM

## 2024-06-16 NOTE — TELEPHONE ENCOUNTER
Please review. Protocol failed / No protocol.    Requested Prescriptions   Pending Prescriptions Disp Refills    MOMETASONE FUROATE 0.1 % External Cream [Pharmacy Med Name: Mometasone Furoate 0.1 % External Cream] 15 g 0     Sig: APPLY  CREAM TOPICALLY TWICE DAILY AS NEEDED (IRRITATED  SKIN  LESION)       There is no refill protocol information for this order          Recent Outpatient Visits              6 days ago Ventral hernia without obstruction or gangrene    [facility], [address] [place] [address] Angeles Mcintosh DO    Office Visit    1 month ago Encounter for annual health examination    [address] Ann Flowers MD    Office Visit    3 months ago Discoid lupus    [facility] Jose Smith MD    Office Visit    4 months ago     [facility] Josiah Wyman OT    Office Visit    4 months ago     [facility] Josiah Wyman, TANO    Office Visit            Future Appointments         Provider Department Appt Notes    In 3 weeks Jose Smith MD [facility], [facility] 4 month f/u    In 1 month Autumn Galvan MD [facility], [facility] NP-Ventral hernia without obstruction or gangrene  *r/s from bck*    In 4 months Ann Flowers MD [address] 6 mo f/u

## 2024-06-17 RX ORDER — MOMETASONE FUROATE 1 MG/G
CREAM TOPICAL
Qty: 15 G | Refills: 0 | Status: SHIPPED | OUTPATIENT
Start: 2024-06-17

## 2024-07-08 ENCOUNTER — TELEPHONE (OUTPATIENT)
Dept: RHEUMATOLOGY | Facility: CLINIC | Age: 89
End: 2024-07-08

## 2024-07-08 DIAGNOSIS — L93.0 DISCOID LUPUS: Primary | ICD-10-CM

## 2024-07-08 RX ORDER — HYDROQUINONE 40 MG/G
1 CREAM TOPICAL DAILY
Qty: 30 G | Refills: 2 | Status: SHIPPED | OUTPATIENT
Start: 2024-07-08

## 2024-07-08 RX ORDER — HYDROQUINONE 40 MG/G
1 CREAM TOPICAL DAILY
Qty: 30 G | Refills: 2 | Status: CANCELLED | OUTPATIENT
Start: 2024-07-08

## 2024-07-08 NOTE — TELEPHONE ENCOUNTER
LOV: 3/6/24  Future Appointments   Date Time Provider Department Center   7/9/2024 11:40 AM Jose Smith MD [id number] [place]   7/22/2024  9:00 AM Auutmn Galvan MD [id number]   11/8/2024 11:00 AM Ann Flowers MD [facility]

## 2024-07-09 ENCOUNTER — OFFICE VISIT (OUTPATIENT)
Dept: RHEUMATOLOGY | Facility: CLINIC | Age: 89
End: 2024-07-09
Payer: MEDICARE

## 2024-07-09 VITALS
DIASTOLIC BLOOD PRESSURE: 62 MMHG | RESPIRATION RATE: 18 BRPM | WEIGHT: 136 LBS | BODY MASS INDEX: 26 KG/M2 | TEMPERATURE: 98 F | SYSTOLIC BLOOD PRESSURE: 114 MMHG | HEART RATE: 65 BPM | OXYGEN SATURATION: 98 %

## 2024-07-09 DIAGNOSIS — Z96.641 STATUS POST TOTAL REPLACEMENT OF RIGHT HIP: ICD-10-CM

## 2024-07-09 DIAGNOSIS — M12.00 JACCOUD'S ARTHROPATHY (HCC): Primary | ICD-10-CM

## 2024-07-09 DIAGNOSIS — L93.0 DISCOID LUPUS: ICD-10-CM

## 2024-07-09 DIAGNOSIS — R76.8 POSITIVE ANA (ANTINUCLEAR ANTIBODY): ICD-10-CM

## 2024-07-09 DIAGNOSIS — M25.50 POLYARTHRALGIA: ICD-10-CM

## 2024-07-09 DIAGNOSIS — M20.091 ULNAR DEVIATION OF FINGERS OF BOTH HANDS: ICD-10-CM

## 2024-07-09 DIAGNOSIS — M20.092 ULNAR DEVIATION OF FINGERS OF BOTH HANDS: ICD-10-CM

## 2024-07-09 PROCEDURE — 99214 OFFICE O/P EST MOD 30 MIN: CPT | Performed by: INTERNAL MEDICINE

## 2024-07-09 RX ORDER — HYDROXYCHLOROQUINE SULFATE 200 MG/1
200 TABLET, FILM COATED ORAL DAILY
Qty: 30 TABLET | Refills: 3 | Status: SHIPPED | OUTPATIENT
Start: 2024-07-09

## 2024-07-09 RX ORDER — ACETAMINOPHEN AND CODEINE PHOSPHATE 300; 30 MG/1; MG/1
1 TABLET ORAL EVERY 6 HOURS PRN
Qty: 30 TABLET | Refills: 1 | Status: SHIPPED | OUTPATIENT
Start: 2024-07-09

## 2024-07-09 NOTE — PROGRESS NOTES
[facility]  Dr. Smith Progress Note     Subjective:   James Ramírez is a(n) 89 year old female.   Current complaints:   Chief Complaint   Patient presents with    Follow - Up     4 month f/u. Pt states symptoms have worsened since LOV; having pain all over since beginning of June and still unable to control her fingers. Taking Tylenol as directed.    History of discoid lupus.  Ha ulnar drift of fingers without erosions = jaccoud's arthropathy.   Having increased joint pain.  Never took plaquenil.   Now painful fingers, painful shoulders.  Had right replaced 24 years ago, now it hurts again.  No chest pain.  Using a pain relief otc spray,  On Eliquis, therefore no NSAIDs used.    Objective:   /62   Pulse 65   Temp 98.4 °F (36.9 °C)   Resp 18   Wt 136 lb (61.7 kg)   SpO2 98%   BMI 25.70 kg/m²   Lungs clear  Heart nsr  Hands ulnar drift with subluxed mcp joints.   Tender shoulders.  Trace tender mcps.    5/8/24 glucose 107 sodium 122 potassium 3.9 chloride 89 BUN 12 creatinine 0.86 calcium 9.0 GFR 65.  Alkaline phosphatase 92 AST 33 ALT 21 bilirubin 1.8 globulin 4.0 vitamin B12 340 cholesterol 163 triglycerides 61   LDL 35 total protein 7.6 albumin 3.6  Vitamin D low at 22.  TSH 1.09.  White count 6.5 hemoglobin 11.8 hematocrit 33.7 platelet count 1 89,000  1/8/2024 sed rate 37 rheumatoid factor 46.  ABRIL positive.  SSA antibody greater than 240.  SSB antibody greater than 320.   Assessment:     Encounter Diagnoses   Name Primary?    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  ES Tylenol 500 mg 1-2 at a time up to 6 per day for jpint and muscle pain.   If no relief with ESTylenol, then take a Tylenol #3 which has codeine in oit.  Use  Tylenol #3 for severe pain.    Use hydroquinone cream daily if needed.  Use sun blocker daily.  Avoid direct sunlight.  Return to office 3 months.          Jose Smith MD 7/9/2024 11:47 AM

## 2024-07-09 NOTE — PATIENT INSTRUCTIONS
Use Plaquenil 200 mg once a day to treat joint pain related to Lupus.  ES Tylenol 500 mg 1-2 at a time up to 6 per day for jpint and muscle pain.   If no relief with ESTylenol, then take a Tylenol #3 which has codeine in oit.  Use Tylenol #3 for severe pain.    Use hydroquinone cream daily if needed.  Use sun blocker daily.  Avoid direct sunlight.  Return to office 3 months.

## 2024-07-09 NOTE — TELEPHONE ENCOUNTER
Hydroquinone 4% gel use daily sent to [facility] pharmacy [address]  Dr. Smith

## 2024-07-19 ENCOUNTER — OFFICE VISIT (OUTPATIENT)
Dept: FAMILY MEDICINE CLINIC | Facility: CLINIC | Age: 89
End: 2024-07-19
Payer: MEDICARE

## 2024-07-19 VITALS
BODY MASS INDEX: 26.24 KG/M2 | TEMPERATURE: 97 F | OXYGEN SATURATION: 98 % | HEIGHT: 61 IN | RESPIRATION RATE: 18 BRPM | DIASTOLIC BLOOD PRESSURE: 63 MMHG | SYSTOLIC BLOOD PRESSURE: 152 MMHG | HEART RATE: 61 BPM | WEIGHT: 139 LBS

## 2024-07-19 DIAGNOSIS — H92.02 ACUTE OTALGIA, LEFT: Primary | ICD-10-CM

## 2024-07-19 PROCEDURE — 99213 OFFICE O/P EST LOW 20 MIN: CPT | Performed by: FAMILY MEDICINE

## 2024-07-19 NOTE — PROGRESS NOTES
CHIEF COMPLAINT:     Chief Complaint   Patient presents with    Ear Pain     Left ear pain for 3 weeks        HPI:   James Ramírez is a 89 year old female who presents to clinic today with complaints of left ear pain. Has had symptoms since 3 weeks ago. Pain is described as intermittent and is located in and around the left ear.  Patient denies history of ear infections.  Pt notes pain is worse in the morning upon waking, but seems to get better as the day goes on.  Home treatment includes tylenol, which she takes for RA.     Associated symptoms:  Patient denies decreased hearing.  Patient denies fever. Patient denies hearing loss. Patient denies drainage. Patient denies use of Q-tips to clean the ears. Patient denies nasal congestion.     Current Outpatient Medications   Medication Sig Dispense Refill    hydroxychloroquine (PLAQUENIL) 200 MG Oral Tab Take 1 tablet (200 mg total) by mouth daily. For Lupus. 30 tablet 3    acetaminophen-codeine (TYLENOL WITH CODEINE #3) 300-30 MG Oral Tab Take 1 tablet by mouth every 6 (six) hours as needed for Pain. 30 tablet 1    Hydroquinone 4 % External Cream Apply 1 Application topically daily. 30 g 2    MOMETASONE FUROATE 0.1 % External Cream APPLY  CREAM TOPICALLY TWICE DAILY AS NEEDED (IRRITATED  SKIN  LESION) 15 g 0    ergocalciferol 1.25 MG (99078 UT) Oral Cap Take 1 capsule (50,000 Units total) by mouth once a week. 12 capsule 0    apixaban (ELIQUIS) 5 MG Oral Tab Take 1 tablet (5 mg total) by mouth 2 (two) times daily. 180 tablet 1    atorvastatin 40 MG Oral Tab Take 1 tablet (40 mg total) by mouth nightly. 90 tablet 1    Labetalol HCl 300 MG Oral Tab Take 1 tablet (300 mg total) by mouth 2 (two) times daily. 180 tablet 1    losartan 100 MG Oral Tab Take 1 tablet (100 mg total) by mouth daily. 90 tablet 1    albuterol 108 (90 Base) MCG/ACT Inhalation Aero Soln Inhale 2 puffs into the lungs every 6 (six) hours as needed for Wheezing. 1 each 3    traMADol 50 MG Oral Tab Take  1 tablet (50 mg total) by mouth 3 (three) times daily as needed for Pain. 30 tablet 1    METAMUCIL 28 % Oral Powd Pack Take 1 packet by mouth every morning.      Cholecalciferol (VITAMIN D3 OR) Take by mouth.      mometasone 0.1 % External Ointment APPLY TWICE DAILY TO AFFECTED AREAS OF LEGS. STOP USING WHEN THESE ARE SMOOTH TO THE TOUCH NO LONGER RAISED. DO NOT USE FOR MORE THAN 1 JAMIR      Vitamin B-12 1000 MCG Oral Tab Take 1 tablet (1,000 mcg total) by mouth daily. 90 tablet 1     No current facility-administered medications for this visit.      Past Medical History:    Acute, but ill-defined, cerebrovascular disease    Arrhythmia    DX AIFIB 2017    Arthritis    Atypical chest pain    Cancer (HCC)    Cataract    Cutaneous lupus erythematosus    + ABRIL 1:320 titer     Endometrial cancer (HCC)    Fibrocystic breast changes    Hypertension    Hyponatremia    Localized, secondary osteoarthritis of the lower leg    Mild intermittent asthma without complication (HCC)    Osteoarthritis    Osteoarthritis of hip    Pneumonia due to organism    Stroke (HCC)    no residual effects    Tibial fracture    Unspecified essential hypertension    office BP runs higher than home.      Social History:  Social History     Socioeconomic History    Marital status:     Number of children: 0   Occupational History    Occupation: Retired      Comment: retired  from [employer]    Occupation: Taught nursery school   Tobacco Use    Smoking status: Former     Current packs/day: 0.00     Average packs/day: 0.6 packs/day for 45.0 years (25.0 ttl pk-yrs)     Types: Cigarettes     Start date: 1955     Quit date: 1995     Years since quittin.5     Passive exposure: Past    Smokeless tobacco: Never   Vaping Use    Vaping status: Never Used   Substance and Sexual Activity    Alcohol use: Yes     Alcohol/week: 8.0 standard drinks of alcohol     Types: 2 Glasses of wine, 6 Shots of liquor per week      Comment: 4x weekly Rum     Drug use: Never    Sexual activity: Yes     Partners: Male   Other Topics Concern    Blood Transfusions Yes     Comment: 14 yr ago with hip replacement (own blood)    Caffeine Concern No    Occupational Exposure No    Hobby Hazards No    Sleep Concern No    Stress Concern No    Weight Concern No    Special Diet No    Exercise Yes    Seat Belt Yes   Social History Narrative    Lives with , recent move to [place]. No children. No pets. No [employer] affiliation     Social Determinants of Health     Physical Activity: Inactive (10/13/2020)    [facility]    Exercise Vital Sign     Days of Exercise per Week: 0 days     Minutes of Exercise per Session: 0 min    [facility], [facility]    Social Connections    Received [facility], [facility]    Housing Stability        REVIEW OF SYSTEMS:   GENERAL: Feeling well otherwise.    SKIN: no unusual skin lesions or rashes  HEENT: See HPI  LUNGS: No cough, shortness of breath, or wheezing.  CARDIOVASCULAR: No chest pain, palpitations  GI: No N/V/C/D. No abdominal pain.  NEURO: denies headaches or dizziness    EXAM:   /63   Pulse 61   Temp 97.3 °F (36.3 °C) (Temporal)   Resp 18   Ht 5' 1\" (1.549 m)   Wt 139 lb (63 kg)   SpO2 98%   BMI 26.26 kg/m²   GENERAL: well developed, well nourished,in no apparent distress  SKIN: no rashes,no suspicious lesions  HEAD: atraumatic, normocephalic  EYES: conjunctivae clear, EOM intact  EARS: Tragus non tender on palpation bilaterally. External auditory canals: patent b/l.  Right TM: pearly, no bulging, no retraction,no effusion, bony landmarks present.  Left TM: pearly, no bulging, no retraction,no effusion, bony landmarks present.  NOSE: nostrils patent, no exudates, nasal mucosa pink and noninflamed  THROAT: oral mucosa pink, moist. Posterior pharynx is  not erythematous or injected. No exudates.  NECK: tender left scalene and posterior fibers of the SCM.   LUNGS: clear to auscultation bilaterally, no wheezes or rhonchi. Breathing is non labored.  CARDIO: RRR without murmur  EXTREMITIES: no cyanosis, clubbing or edema  LYMPH: no lymphadenopathy.      ASSESSMENT AND PLAN:   James Ramírez is a 89 year old female who presents with:    ASSESSMENT:  Encounter Diagnosis   Name Primary?    Acute otalgia, left Yes       PLAN: Meds as listed below.  Comfort measures as described in Patient Instructions    Meds & Refills for this Visit:  Requested Prescriptions      No prescriptions requested or ordered in this encounter           Patient Instructions   Monitor symptoms   Take tylenol for pain.   Consider using topical menthol preparation like icy hot to help relieve muscle pain.   Work on gentle stretches or range of motion to help loosen tight muscles.   If no better or if symptoms worsen, follow-up with your PCP for further evaluation.         Patient voiced understand and is in agreement with treatment plan.

## 2024-07-19 NOTE — PATIENT INSTRUCTIONS
Monitor symptoms   Take tylenol for pain.   Consider using topical menthol preparation like icy hot to help relieve muscle pain.   Work on gentle stretches or range of motion to help loosen tight muscles.   If no better or if symptoms worsen, follow-up with your PCP for further evaluation.

## 2024-07-22 ENCOUNTER — OFFICE VISIT (OUTPATIENT)
Facility: LOCATION | Age: 89
End: 2024-07-22
Payer: MEDICARE

## 2024-07-22 VITALS — TEMPERATURE: 97 F | HEART RATE: 73 BPM | OXYGEN SATURATION: 98 % | RESPIRATION RATE: 16 BRPM

## 2024-07-22 DIAGNOSIS — K43.9 VENTRAL HERNIA WITHOUT OBSTRUCTION OR GANGRENE: Primary | ICD-10-CM

## 2024-07-22 NOTE — H&P
Patient ID: James Ramírez is a 89 year old female.    Chief Complaint   Patient presents with    New Patient     Establish care and discuss Ventral hernia without obstruction or gangrene diagnosed 6/10/24 - Pt noticed a bulge around umbilical area sometime in May 2024. Pt denies any pain.       HPI: James Ramírez is a 89 year old female presents to clinic for evaluation.  Patient reports noticing a bulge a few months ago.  She has a history of an umbilical hernia that became incarcerated requiring surgery back in 2017.  She followed up with her PCP who referred her for further evaluation and management.  Today, she denies any nausea or vomiting or changes to her bowel habits.  She has noticed a bulge but denies any pain at the site.    Workup: None      Past Medical History  Past Medical History:    Acute, but ill-defined, cerebrovascular disease    Arrhythmia    DX AIFIB 1/2017    Arthritis    Atypical chest pain    Cancer (HCC)    Cataract    Cutaneous lupus erythematosus    + ABRIL 1:320 titer 2/13    Endometrial cancer (HCC)    Fibrocystic breast changes    Hypertension    Hyponatremia    Localized, secondary osteoarthritis of the lower leg    Mild intermittent asthma without complication (HCC)    Osteoarthritis    Osteoarthritis of hip    Pneumonia due to organism    Stroke (HCC)    no residual effects    Tibial fracture    Unspecified essential hypertension    office BP runs higher than home.       Past Surgical History  Past Surgical History:   Procedure Laterality Date    Breast lumpectomy Left 11/2009    benign fibroadenoma    D & c      Hernia surgery  02/06/2017    Hip replacement surgery  2001    Hysterectomy  1993    total hystero.    Ya biopsy stereo nodule 1 site left (cpt=19081) Left 09/2017    left exc to follow    Ya localization wire 1 site left (cpt=19281) Left 11/2017    benign-ADH    Ya localization wire 1 site right (cpt=19281) Right 1970    benign tumor    Oophorectomy Bilateral 1993     total hystero    Total hip replacement Right 07/2000    at [facility]    Umbilical hernia repair N/A 02/06/2017    Procedure: HERNIA UMBILICAL REPAIR ADULT;  Surgeon: Treasure Vaughn MD;  Location:  MAIN OR       Medications  Current Outpatient Medications   Medication Sig Dispense Refill    hydroxychloroquine (PLAQUENIL) 200 MG Oral Tab Take 1 tablet (200 mg total) by mouth daily. For Lupus. 30 tablet 3    acetaminophen-codeine (TYLENOL WITH CODEINE #3) 300-30 MG Oral Tab Take 1 tablet by mouth every 6 (six) hours as needed for Pain. 30 tablet 1    Hydroquinone 4 % External Cream Apply 1 Application topically daily. 30 g 2    MOMETASONE FUROATE 0.1 % External Cream APPLY  CREAM TOPICALLY TWICE DAILY AS NEEDED (IRRITATED  SKIN  LESION) 15 g 0    apixaban (ELIQUIS) 5 MG Oral Tab Take 1 tablet (5 mg total) by mouth 2 (two) times daily. 180 tablet 1    atorvastatin 40 MG Oral Tab Take 1 tablet (40 mg total) by mouth nightly. 90 tablet 1    Labetalol HCl 300 MG Oral Tab Take 1 tablet (300 mg total) by mouth 2 (two) times daily. 180 tablet 1    losartan 100 MG Oral Tab Take 1 tablet (100 mg total) by mouth daily. 90 tablet 1    albuterol 108 (90 Base) MCG/ACT Inhalation Aero Soln Inhale 2 puffs into the lungs every 6 (six) hours as needed for Wheezing. 1 each 3    traMADol 50 MG Oral Tab Take 1 tablet (50 mg total) by mouth 3 (three) times daily as needed for Pain. 30 tablet 1    METAMUCIL 28 % Oral Powd Pack Take 1 packet by mouth every morning.      ergocalciferol 1.25 MG (72369 UT) Oral Cap Take 1 capsule (50,000 Units total) by mouth once a week. (Patient not taking: Reported on 7/22/2024) 12 capsule 0    Cholecalciferol (VITAMIN D3 OR) Take by mouth. (Patient not taking: Reported on 7/22/2024)      mometasone 0.1 % External Ointment APPLY TWICE DAILY TO AFFECTED AREAS OF LEGS. STOP USING WHEN THESE ARE SMOOTH TO THE TOUCH NO LONGER RAISED. DO NOT USE FOR MORE THAN 1 JAMIR (Patient not taking: Reported on  7/22/2024)      Vitamin B-12 1000 MCG Oral Tab Take 1 tablet (1,000 mcg total) by mouth daily. (Patient not taking: Reported on 7/22/2024) 90 tablet 1       Allergies  Allergies   Allergen Reactions    Thiazide-Type Diuretics OTHER (SEE COMMENTS)     Hyponatremia  hyponatremia  Hyponatremia    No Known Allergies ITCHING     redness    Ace Inhibitors Coughing    Adhesive Tape ITCHING     redness    Amlodipine SWELLING     Swelling of ankles    Clonidine DIZZINESS    Furosemide OTHER (SEE COMMENTS)     Severe hyponatremia requiring hospitalization  hyponatremia       Social History  History   Smoking Status    Former    Types: Cigarettes   Smokeless Tobacco    Never     History   Alcohol Use    8.0 standard drinks of alcohol/week    2 Glasses of wine, 6 Shots of liquor per week     Comment: 4x weekly Rum      History   Drug Use Unknown       Family History  Family History   Problem Relation Age of Onset    Diabetes Father     Diabetes Mother         rheumatic heart disease    Heart Disorder Mother     Diabetes Brother        Review of Systems  Review of Systems   Constitutional: Negative.    Respiratory: Negative.     Cardiovascular: Negative.    Gastrointestinal:  Negative for abdominal distention, abdominal pain, constipation, nausea and vomiting.       Exam  Vitals:    07/22/24 0909   Pulse: 73   Resp: 16   Temp: 97.1 °F (36.2 °C)     Physical Exam  Constitutional:       Appearance: Normal appearance.   Cardiovascular:      Rate and Rhythm: Normal rate.   Pulmonary:      Effort: Pulmonary effort is normal.   Abdominal:      General: Abdomen is flat. There is no distension.      Palpations: Abdomen is soft.      Tenderness: There is no abdominal tenderness.      Hernia: A hernia is present.       Skin:     General: Skin is warm and dry.   Neurological:      Mental Status: She is alert and oriented to person, place, and time.           Assessment/Plan  Assessment   Problem List Items Addressed This Visit     None  Visit Diagnoses       Ventral hernia without obstruction or gangrene    -  Primary            James Ramírez is a 89 year old female with a ventral hernia    On physical exam, patient has a reducible hernia with approximately 2 cm defect  Hernia is nontender  Patient is otherwise asymptomatic without any nausea, vomiting, or changes to her bowel habits  At this time, I recommend continued observation  Symptoms of incarceration discussed  If patient has any severe pain at the site or inability to reduce the bulge, she is to call my office that she may need surgery sooner  Patient can follow-up as needed  She can call my office for any questions or concerns    Thank you for letting me participate in the care of this patient      Autumn Galvan MD  General Surgery  [facility]     CC:  Ann Flowers MD

## 2024-08-21 ENCOUNTER — HOSPITAL ENCOUNTER (OUTPATIENT)
Dept: BONE DENSITY | Age: 89
Discharge: HOME | End: 2024-08-21
Attending: FAMILY MEDICINE
Payer: MEDICARE

## 2024-08-21 DIAGNOSIS — Z78.0 POSTMENOPAUSAL: ICD-10-CM

## 2024-08-21 PROCEDURE — 77080 DXA BONE DENSITY AXIAL: CPT | Performed by: FAMILY MEDICINE

## 2024-10-03 DIAGNOSIS — L93.0 DISCOID LUPUS: ICD-10-CM

## 2024-10-04 RX ORDER — MOMETASONE FUROATE 1 MG/G
CREAM TOPICAL
Qty: 15 G | Refills: 0 | Status: SHIPPED | OUTPATIENT
Start: 2024-10-04

## 2024-10-04 NOTE — TELEPHONE ENCOUNTER
Please Review. Protocol Failed; No Protocol     Requested Prescriptions   Pending Prescriptions Disp Refills    MOMETASONE FUROATE 0.1 % External Cream [Pharmacy Med Name: Mometasone Furoate 0.1 % External Cream] 15 g 0     Sig: APPLY CREAM TOPICALLY TWICE DAILY AS NEEDED FOR IRRITATED SKIN LESION.       There is no refill protocol information for this order            Future Appointments         Provider Department Appt Notes    In 6 days Jose Smith MD [facility], [facility] 3mo    In 1 month Ann Flowers MD [facility], [address] [place] [address] 6 mo f/u          Recent Outpatient Visits              2 months ago Ventral hernia without obstruction or gangrene    [facility], [facility] Autumn Galvan MD    Office Visit    2 months ago Acute otalgia, left    [facility], [facility], [address] [place] [address] Jeanette Moy PA-C    Office Visit    2 months ago Jaccoud's arthropathy (HCC)    [facility] Jose Smith MD    Office Visit    3 months ago Ventral hernia without obstruction or gangrene    [facility], [address] [place] [address] Angeles Mcintosh DO    Office Visit    4 months ago Encounter for annual health examination    [address] Ann Flowers MD    Office Visit

## 2024-10-10 ENCOUNTER — OFFICE VISIT (OUTPATIENT)
Dept: RHEUMATOLOGY | Facility: CLINIC | Age: 89
End: 2024-10-10
Payer: MEDICARE

## 2024-10-10 VITALS
DIASTOLIC BLOOD PRESSURE: 70 MMHG | TEMPERATURE: 98 F | SYSTOLIC BLOOD PRESSURE: 168 MMHG | WEIGHT: 131 LBS | OXYGEN SATURATION: 99 % | HEART RATE: 66 BPM | RESPIRATION RATE: 16 BRPM | HEIGHT: 61 IN | BODY MASS INDEX: 24.73 KG/M2

## 2024-10-10 DIAGNOSIS — M20.091 ULNAR DEVIATION OF FINGERS OF BOTH HANDS: ICD-10-CM

## 2024-10-10 DIAGNOSIS — L93.0 DISCOID LUPUS: Primary | ICD-10-CM

## 2024-10-10 DIAGNOSIS — R76.8 SS-A ANTIBODY POSITIVE: ICD-10-CM

## 2024-10-10 DIAGNOSIS — Z96.641 STATUS POST TOTAL REPLACEMENT OF RIGHT HIP: ICD-10-CM

## 2024-10-10 DIAGNOSIS — M20.092 ULNAR DEVIATION OF FINGERS OF BOTH HANDS: ICD-10-CM

## 2024-10-10 PROCEDURE — 99214 OFFICE O/P EST MOD 30 MIN: CPT | Performed by: INTERNAL MEDICINE

## 2024-10-10 RX ORDER — MOMETASONE FUROATE 1 MG/G
1 OINTMENT TOPICAL DAILY
Qty: 45 G | Refills: 1 | Status: SHIPPED | OUTPATIENT
Start: 2024-10-10

## 2024-10-10 RX ORDER — HYDROXYCHLOROQUINE SULFATE 200 MG/1
200 TABLET, FILM COATED ORAL DAILY
Qty: 30 TABLET | Refills: 3 | Status: SHIPPED | OUTPATIENT
Start: 2024-10-10

## 2024-10-10 NOTE — PATIENT INSTRUCTIONS
Continue Plaquenil 200 mg once a day for the discoid lupus.  Eye exam yearly.  Okay to use your cortisone cream or ointment once a day for skin rash.  Mometasome cream once a day prn.    You are on a blood thinner Eliquis therefore you cannot take any ibuprofen or Aleve.  For joint pain use extra strength Tylenol 500 mg 1 or 2 tablets twice a day if needed.  Return to office for refill in 4 months.

## 2024-10-10 NOTE — PROGRESS NOTES
[facility]  Dr. Smith Progress Note     Subjective:   James Ramírez is a(n) 89 year old female.   Current complaints:   Chief Complaint   Patient presents with    Follow - Up     3 month f/u. Feeling pretty good. Some ankle swelling. Some hand pain. Stable skin rashes. Converted rapid score of 5.3   History of Discoid Lupus.  Has ulnar drift of th hand fingers.  Wears compression gloves for her fingers. Using hydroxychloroquine 200 mg per day.  It helps with the joint  pain.  On Eliquis.    Objective:   BP (!) 168/70   Pulse 66   Temp 97.6 °F (36.4 °C)   Resp 16   Ht 5' 1\" (1.549 m)   Wt 131 lb (59.4 kg)   SpO2 99%   BMI 24.75 kg/m²   Has ulnar drift severe in both hands -  Left 3/4 fingers poor extension.    Decreased left hand .  Lungs clear  Heart nsr   No butterfly facial rash.   5/8/25  BUN 12  Crt 0.86   Vit D  22  1/8/24  SSA   units   SSB  AB   320 units   Assessment:     Encounter Diagnoses   Name Primary?    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  Eye exam yearly.  Okay to use your cortisone cream or ointment once a day for skin rash.  Mometasome cream once a day prn.    You are on a blood thinner Eliquis therefore you cannot take any ibuprofen or Aleve.  For joint pain use extra strength Tylenol 500 mg 1 or 2 tablets twice a day if needed.  Return to office for refill in 4 months.        Jose Smith MD 10/10/2024 2:33 PM

## 2024-11-08 ENCOUNTER — OFFICE VISIT (OUTPATIENT)
Dept: FAMILY MEDICINE CLINIC | Facility: CLINIC | Age: 89
End: 2024-11-08
Payer: MEDICARE

## 2024-11-08 ENCOUNTER — LAB ENCOUNTER (OUTPATIENT)
Dept: LAB | Age: 89
End: 2024-11-08
Attending: FAMILY MEDICINE
Payer: MEDICARE

## 2024-11-08 VITALS
TEMPERATURE: 97 F | WEIGHT: 130.25 LBS | HEIGHT: 61 IN | OXYGEN SATURATION: 99 % | RESPIRATION RATE: 16 BRPM | BODY MASS INDEX: 24.59 KG/M2 | HEART RATE: 60 BPM | DIASTOLIC BLOOD PRESSURE: 82 MMHG | SYSTOLIC BLOOD PRESSURE: 138 MMHG

## 2024-11-08 DIAGNOSIS — Z79.01 CHRONIC ANTICOAGULATION: ICD-10-CM

## 2024-11-08 DIAGNOSIS — E87.1 HYPONATREMIA: ICD-10-CM

## 2024-11-08 DIAGNOSIS — E55.9 VITAMIN D DEFICIENCY: ICD-10-CM

## 2024-11-08 DIAGNOSIS — E78.00 PURE HYPERCHOLESTEROLEMIA: ICD-10-CM

## 2024-11-08 DIAGNOSIS — D50.9 IRON DEFICIENCY ANEMIA, UNSPECIFIED IRON DEFICIENCY ANEMIA TYPE: ICD-10-CM

## 2024-11-08 DIAGNOSIS — I10 ESSENTIAL HYPERTENSION: Primary | ICD-10-CM

## 2024-11-08 DIAGNOSIS — I48.0 PAROXYSMAL ATRIAL FIBRILLATION (HCC): ICD-10-CM

## 2024-11-08 DIAGNOSIS — R26.89 BALANCE PROBLEM: ICD-10-CM

## 2024-11-08 DIAGNOSIS — R60.0 BILATERAL EDEMA OF LOWER EXTREMITY: ICD-10-CM

## 2024-11-08 LAB
ALBUMIN SERPL-MCNC: 4.3 G/DL (ref 3.2–4.8)
ALBUMIN/GLOB SERPL: 1.2 {RATIO} (ref 1–2)
ALP LIVER SERPL-CCNC: 83 U/L
ALT SERPL-CCNC: 15 U/L
ANION GAP SERPL CALC-SCNC: 5 MMOL/L (ref 0–18)
AST SERPL-CCNC: 30 U/L (ref ?–34)
BASOPHILS # BLD AUTO: 0.03 X10(3) UL (ref 0–0.2)
BASOPHILS NFR BLD AUTO: 0.5 %
BILIRUB SERPL-MCNC: 1.5 MG/DL (ref 0.2–1.1)
BUN BLD-MCNC: 10 MG/DL (ref 9–23)
CALCIUM BLD-MCNC: 10.3 MG/DL (ref 8.7–10.4)
CHLORIDE SERPL-SCNC: 92 MMOL/L (ref 98–112)
CO2 SERPL-SCNC: 28 MMOL/L (ref 21–32)
CREAT BLD-MCNC: 0.93 MG/DL
EGFRCR SERPLBLD CKD-EPI 2021: 59 ML/MIN/1.73M2 (ref 60–?)
EOSINOPHIL # BLD AUTO: 0.07 X10(3) UL (ref 0–0.7)
EOSINOPHIL NFR BLD AUTO: 1.3 %
ERYTHROCYTE [DISTWIDTH] IN BLOOD BY AUTOMATED COUNT: 11.9 %
FASTING STATUS PATIENT QL REPORTED: YES
GLOBULIN PLAS-MCNC: 3.7 G/DL (ref 2–3.5)
GLUCOSE BLD-MCNC: 93 MG/DL (ref 70–99)
HCT VFR BLD AUTO: 37 %
HGB BLD-MCNC: 12.6 G/DL
IMM GRANULOCYTES # BLD AUTO: 0.02 X10(3) UL (ref 0–1)
IMM GRANULOCYTES NFR BLD: 0.4 %
IRON SATN MFR SERPL: 21 %
IRON SERPL-MCNC: 64 UG/DL
LYMPHOCYTES # BLD AUTO: 0.67 X10(3) UL (ref 1–4)
LYMPHOCYTES NFR BLD AUTO: 12.1 %
MCH RBC QN AUTO: 33 PG (ref 26–34)
MCHC RBC AUTO-ENTMCNC: 34.1 G/DL (ref 31–37)
MCV RBC AUTO: 96.9 FL
MONOCYTES # BLD AUTO: 0.57 X10(3) UL (ref 0.1–1)
MONOCYTES NFR BLD AUTO: 10.3 %
NEUTROPHILS # BLD AUTO: 4.2 X10 (3) UL (ref 1.5–7.7)
NEUTROPHILS # BLD AUTO: 4.2 X10(3) UL (ref 1.5–7.7)
NEUTROPHILS NFR BLD AUTO: 75.4 %
OSMOLALITY SERPL CALC.SUM OF ELEC: 259 MOSM/KG (ref 275–295)
PLATELET # BLD AUTO: 201 10(3)UL (ref 150–450)
POTASSIUM SERPL-SCNC: 3.8 MMOL/L (ref 3.5–5.1)
PROT SERPL-MCNC: 8 G/DL (ref 5.7–8.2)
RBC # BLD AUTO: 3.82 X10(6)UL
SODIUM SERPL-SCNC: 125 MMOL/L (ref 136–145)
TOTAL IRON BINDING CAPACITY: 308 UG/DL (ref 250–425)
TRANSFERRIN SERPL-MCNC: 235 MG/DL (ref 250–380)
VIT D+METAB SERPL-MCNC: 42.2 NG/ML (ref 30–100)
WBC # BLD AUTO: 5.6 X10(3) UL (ref 4–11)

## 2024-11-08 PROCEDURE — 82306 VITAMIN D 25 HYDROXY: CPT

## 2024-11-08 PROCEDURE — 80053 COMPREHEN METABOLIC PANEL: CPT

## 2024-11-08 PROCEDURE — 1159F MED LIST DOCD IN RCRD: CPT | Performed by: FAMILY MEDICINE

## 2024-11-08 PROCEDURE — 85025 COMPLETE CBC W/AUTO DIFF WBC: CPT

## 2024-11-08 PROCEDURE — 83550 IRON BINDING TEST: CPT

## 2024-11-08 PROCEDURE — 99214 OFFICE O/P EST MOD 30 MIN: CPT | Performed by: FAMILY MEDICINE

## 2024-11-08 PROCEDURE — 36415 COLL VENOUS BLD VENIPUNCTURE: CPT

## 2024-11-08 PROCEDURE — 1160F RVW MEDS BY RX/DR IN RCRD: CPT | Performed by: FAMILY MEDICINE

## 2024-11-08 PROCEDURE — 83540 ASSAY OF IRON: CPT

## 2024-11-08 RX ORDER — LABETALOL 300 MG/1
300 TABLET, FILM COATED ORAL 2 TIMES DAILY
Qty: 180 TABLET | Refills: 1 | Status: SHIPPED | OUTPATIENT
Start: 2024-11-08

## 2024-11-08 RX ORDER — ATORVASTATIN CALCIUM 40 MG/1
40 TABLET, FILM COATED ORAL NIGHTLY
Qty: 90 TABLET | Refills: 1 | Status: SHIPPED | OUTPATIENT
Start: 2024-11-08

## 2024-11-08 RX ORDER — LOSARTAN POTASSIUM 100 MG/1
100 TABLET ORAL DAILY
Qty: 90 TABLET | Refills: 1 | Status: SHIPPED | OUTPATIENT
Start: 2024-11-08

## 2024-11-08 RX ORDER — HYDROCHLOROTHIAZIDE 12.5 MG/1
12.5 TABLET ORAL DAILY PRN
Qty: 90 TABLET | Refills: 0 | Status: SHIPPED | OUTPATIENT
Start: 2024-11-08 | End: 2025-02-06

## 2024-11-08 NOTE — PROGRESS NOTES
Family Medicine Progress Note  ASSESSMENT AND PLAN:  James Ramírez is a 89 year old female who is here for:     James was seen today for hypertension and medication follow-up.    Diagnoses and all orders for this visit:    Essential hypertension controlled  -     losartan 100 MG Oral Tab; Take 1 tablet (100 mg total) by mouth daily.  -     Labetalol HCl 300 MG Oral Tab; Take 1 tablet (300 mg total) by mouth 2 (two) times daily.  -     hydroCHLOROthiazide 12.5 MG Oral Tab; Take 1 tablet (12.5 mg total) by mouth daily as needed.  -  continue the same dose of medication  -  DASH diet, limit salt to less than 2000 mg  -  Goal BP less than 130/80    Pure hypercholesterolemia controlled  -     atorvastatin 40 MG Oral Tab; Take 1 tablet (40 mg total) by mouth nightly.  -     continue the same dose of medication    Chronic anticoagulation  -     apixaban (ELIQUIS) 5 MG Oral Tab; Take 1 tablet (5 mg total) by mouth 2 (two) times daily.    Paroxysmal atrial fibrillation (HCC), rate controlled  -     apixaban (ELIQUIS) 5 MG Oral Tab; Take 1 tablet (5 mg total) by mouth 2 (two) times daily.  -     continue the same dose of medication  -     f/u with cardiology for further management    Vitamin D deficiency  -     Vitamin D; Future    Iron deficiency anemia, unspecified iron deficiency anemia type  -     Iron And Tibc [E]; Future  -     CBC W Differential W Platelet [E]; Future    Hyponatremia controlled  -     Comp Metabolic Panel (14) [E]; Future    Balance problem       - discussed PT, patient states is doing exercises at home    Bilateral edema of lower extremity  -     hydroCHLOROthiazide 12.5 MG Oral Tab; Take 1 tablet (12.5 mg total) by mouth daily as needed.         The patient indicates understanding of these issues and agrees to the plan.  Follow-Up: The patient is asked to return in Return in about 6 months (around 5/8/2025) for annual, HTN f/u, hyperlipidemia.  .     Ann Flowers MD   11/08/24      CC:  Hypertension and Medication Follow-Up    HPI:   James Ramírez is a 89 year old female who presents for Hypertension and Medication Follow-Up     Hypertension is well controlled, has been compliant with her medication and low salt diet, tolerates medication well without any side effects. Denies headache, dizziness, vision changes, chest pain, aplpitations, + swelling in legs occasional, states takes hydrochlorothiazide as needed only  Feels like her balance has been off a little, has started back exercising on her bicycle and wants to know if she can do anything else.    Followed up with rheumatologist, is on Plaquenil, pain is controlled, fingers are deviated and states she is finding it harder to hold things.    Has an appointment to follow up with her cardiologist.    ALLERGY:     Allergies as of 11/08/2024 - Review Complete 10/10/2024   Allergen Reaction Noted    Thiazide-type diuretics OTHER (SEE COMMENTS) 08/12/2011    No known allergies ITCHING 10/25/2017    Ace inhibitors Coughing 08/06/2013    Adhesive tape ITCHING 10/25/2017    Amlodipine SWELLING 08/06/2013    Clonidine DIZZINESS 04/19/2017    Furosemide OTHER (SEE COMMENTS) 08/06/2013     MEDICATIONS:     Current Outpatient Medications   Medication Sig Dispense Refill    losartan 100 MG Oral Tab Take 1 tablet (100 mg total) by mouth daily. 90 tablet 1    Labetalol HCl 300 MG Oral Tab Take 1 tablet (300 mg total) by mouth 2 (two) times daily. 180 tablet 1    atorvastatin 40 MG Oral Tab Take 1 tablet (40 mg total) by mouth nightly. 90 tablet 1    apixaban (ELIQUIS) 5 MG Oral Tab Take 1 tablet (5 mg total) by mouth 2 (two) times daily. 180 tablet 1    hydroxychloroquine (PLAQUENIL) 200 MG Oral Tab Take 1 tablet (200 mg total) by mouth daily. For Lupus. 30 tablet 3    Mometasone Furoate 0.1 % External Cream APPLY CREAM TOPICALLY TWICE DAILY AS NEEDED FOR IRRITATED SKIN LESION. 15 g 0    Hydroquinone 4 % External Cream Apply 1 Application topically daily. 30 g  2    METAMUCIL 28 % Oral Powd Pack Take 1 packet by mouth every morning.      Cholecalciferol (VITAMIN D3 OR) Take by mouth.      mometasone 0.1 % External Ointment Apply 1 Application topically daily. (Patient not taking: Reported on 2024) 45 g 1    acetaminophen-codeine (TYLENOL WITH CODEINE #3) 300-30 MG Oral Tab Take 1 tablet by mouth every 6 (six) hours as needed for Pain. (Patient not taking: Reported on 2024) 30 tablet 1    albuterol 108 (90 Base) MCG/ACT Inhalation Aero Soln Inhale 2 puffs into the lungs every 6 (six) hours as needed for Wheezing. (Patient not taking: Reported on 2024) 1 each 3      Past Medical History:    Acute, but ill-defined, cerebrovascular disease    Arrhythmia    DX AIFIB 2017    Arthritis    Atypical chest pain    Cancer (HCC)    Cataract    Cutaneous lupus erythematosus    + ABRIL 1:320 titer     Endometrial cancer (HCC)    Fibrocystic breast changes    Hypertension    Hyponatremia    Localized, secondary osteoarthritis of the lower leg    Mild intermittent asthma without complication (HCC)    Osteoarthritis    Osteoarthritis of hip    Pneumonia due to organism    Stroke (HCC)    no residual effects    Tibial fracture    Unspecified essential hypertension    office BP runs higher than home.      Social History:  Social History     Socioeconomic History    Marital status:     Number of children: 0   Occupational History    Occupation: Retired      Comment: retired 1994 from [employer]    Occupation: Taught nursery school   Tobacco Use    Smoking status: Former     Current packs/day: 0.00     Average packs/day: 0.6 packs/day for 45.0 years (25.0 ttl pk-yrs)     Types: Cigarettes     Start date: 1955     Quit date: 1995     Years since quittin.8     Passive exposure: Past    Smokeless tobacco: Never   Vaping Use    Vaping status: Never Used   Substance and Sexual Activity    Alcohol use: Yes     Alcohol/week: 8.0 standard drinks  of alcohol     Types: 2 Glasses of wine, 6 Shots of liquor per week     Comment: 4x weekly Rum     Drug use: Never    Sexual activity: Yes     Partners: Male   Other Topics Concern    Blood Transfusions Yes     Comment: 14 yr ago with hip replacement (own blood)    Caffeine Concern No    Occupational Exposure No    Hobby Hazards No    Sleep Concern No    Stress Concern No    Weight Concern No    Special Diet No    Exercise Yes    Seat Belt Yes   Social History Narrative    Lives with , recent move to [place]. No children. No pets. No [employer] affiliation     Social Drivers of Health     Physical Activity: Inactive (10/13/2020)    [facility] [employer] [employer]    Exercise Vital Sign     Days of Exercise per Week: 0 days     Minutes of Exercise per Session: 0 min    [facility], [facility]    Social Connections    Received from [facility], [facility]    Housing Stability        REVIEW OF SYSTEMS:   GENERAL HEALTH: feels well otherwise  SKIN: denies any unusual skin lesions or rashes  RESPIRATORY: denies shortness of breath with exertion  CARDIOVASCULAR: denies chest pain on exertion  GI: denies abdominal pain and denies heartburn  NEURO: denies headaches    EXAM:   /82   Pulse 60   Temp 97.2 °F (36.2 °C) (Temporal)   Resp 16   Ht 5' 1\" (1.549 m)   Wt 130 lb 4 oz (59.1 kg)   SpO2 99%   BMI 24.61 kg/m²   GENERAL: well developed, well nourished,in no apparent distress  SKIN: no rashes,no suspicious lesions  HEENT: atraumatic, normocephalic,ears and throat are clear  NECK: supple,no adenopathy,no bruits  LUNGS: clear to auscultation  CARDIO: irregularly, irregular  GI: good BS's,no masses, HSM or tenderness  HANDS: ulnar deviation of fingers on both hands.  EXTREMITIES: trace edema of bilateral LE      NOTE TO PATIENT: The 21st Century Cures Act makes clinical notes like these  available to patients in the interest of transparency. Clinical notes are medical documents used by physicians and care providers to communicate with each other. These documents include medical language and terminology, abbreviations, and treatment information that may sound technical and at times possibly unfamiliar. In addition, at times, the verbiage may appear blunt or direct. These documents are one tool providers use to communicate relevant information and clinical opinions of the care providers in a way that allows common understanding of the clinical context.      Ann Flowers MD    11/08/24 11:47 AM

## 2024-11-12 RX ORDER — APIXABAN 5 MG/1
5 TABLET, FILM COATED ORAL 2 TIMES DAILY
Qty: 180 TABLET | Refills: 0 | OUTPATIENT
Start: 2024-11-12

## 2025-02-10 ENCOUNTER — OFFICE VISIT (OUTPATIENT)
Dept: RHEUMATOLOGY | Facility: CLINIC | Age: 89
End: 2025-02-10
Payer: MEDICARE

## 2025-02-10 VITALS
WEIGHT: 134 LBS | TEMPERATURE: 97 F | HEIGHT: 61 IN | BODY MASS INDEX: 25.3 KG/M2 | DIASTOLIC BLOOD PRESSURE: 78 MMHG | SYSTOLIC BLOOD PRESSURE: 100 MMHG | HEART RATE: 61 BPM | RESPIRATION RATE: 16 BRPM | OXYGEN SATURATION: 98 %

## 2025-02-10 DIAGNOSIS — M20.092 ULNAR DEVIATION OF FINGERS OF BOTH HANDS: ICD-10-CM

## 2025-02-10 DIAGNOSIS — L93.0 DISCOID LUPUS: Primary | ICD-10-CM

## 2025-02-10 DIAGNOSIS — I48.0 PAROXYSMAL ATRIAL FIBRILLATION (HCC): ICD-10-CM

## 2025-02-10 DIAGNOSIS — M20.091 ULNAR DEVIATION OF FINGERS OF BOTH HANDS: ICD-10-CM

## 2025-02-10 DIAGNOSIS — M12.00 JACCOUD'S ARTHROPATHY (HCC): ICD-10-CM

## 2025-02-10 DIAGNOSIS — Z79.01 CHRONIC ANTICOAGULATION: ICD-10-CM

## 2025-02-10 DIAGNOSIS — R53.82 CHRONIC FATIGUE: ICD-10-CM

## 2025-02-10 PROCEDURE — 99214 OFFICE O/P EST MOD 30 MIN: CPT | Performed by: INTERNAL MEDICINE

## 2025-02-10 PROCEDURE — 1159F MED LIST DOCD IN RCRD: CPT | Performed by: INTERNAL MEDICINE

## 2025-02-10 RX ORDER — HYDROQUINONE 40 MG/G
1 CREAM TOPICAL DAILY
Qty: 30 G | Refills: 3 | Status: SHIPPED | OUTPATIENT
Start: 2025-02-10

## 2025-02-10 NOTE — PATIENT INSTRUCTIONS
Use hydroquinone cream for the skin as needed.  Hydroxychloroquine 200 mg per day.  Get up slowly, establish your balance, walk with a cane.    Soak right hand in warm water for 1-15 minutes.  Return to office 6 months.

## 2025-02-10 NOTE — PROGRESS NOTES
[facility]  Dr. Smith Progress Note     Subjective:   James Ramírez is a(n) 89 year old female.   Current complaints:   Chief Complaint   Patient presents with    Follow - Up     LOV: 10/10/24  Patient is here for a follow up visit. Patient states that she's fatigued. Had trouble with her gait and balance. Had a fall one week ago that bruised her hand and foot.  Rapid 3: 5.7   History of discoid lupus and has ulnar drift of the hands.  C/o fatigue and balance issues.  Fell a week ago.  Injured her right hand and right foot.  Developed a bruise bee right hand.   Does not usually take a nap.  Not interested in physical therapy.  Stopped plaquenil not sure it helps.  Eye exam ok.  No skin rash.  No major joint pain.    Objective:   /78   Pulse 61   Temp 97.4 °F (36.3 °C)   Resp 16   Ht 5' 1\" (1.549 m)   Wt 134 lb (60.8 kg)   SpO2 98%   BMI 25.32 kg/m²   Skin clear.  Lungs clear.  Heart normal sinus rhythm.  Hands have ulnar deviation.  Right hand slight bruise bee in the web of the thumb and index finger.  Legs trace edema.  11/8/24 glucose 93 sodium 125 potassium 3.8 chloride 92 BUN 10 creatinine 0.93 calcium 10.3 GFR 59.  Alkaline phosphatase 83 AST 30 ALT 15 bilirubin 1.5 globulin 3.7 iron 64 iron binding capacity 308 saturation 21%  Total protein 8.0 albumin 4.3 vitamin D 42.  White count 5.6 hemoglobin 12.6 hematocrit 37.0 platelet count 201,000 MCV 96  Assessment:     Encounter Diagnoses   Name Primary?    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  Hydroxychloroquine 200 mg per day.  Get up slowly, establish your balance, walk with a cane.    Soak right hand in warm water for 1-15 minutes.  Return to office 6 months.         Jose Smith MD 2/10/2025 1:16 PM

## 2025-02-17 ENCOUNTER — NURSE TRIAGE (OUTPATIENT)
Dept: INTERNAL MEDICINE CLINIC | Facility: CLINIC | Age: 89
End: 2025-02-17

## 2025-02-17 NOTE — TELEPHONE ENCOUNTER
Action Requested: Summary for Provider     []  Critical Lab, Recommendations Needed  [] Need Additional Advice  []   FYI    []   Need Orders  [] Need Medications Sent to Pharmacy  []  Other     SUMMARY: Patient called to report redness, pain and swelling to left lower leg (just above the ankle) after a fall 2 weeks ago. Noticed the redness on Saturday. States the redness the same since Saturday and is tender to touch. Denies fever. States she tripped over a heavy throw rug in her house and fell. Can ambulate, full ROM. No broken skin. Advised on Home care until seen. Unable to take ibuprofen due to being on Eliquis      Reason for call: Leg or Foot Injury  Onset: Data Unavailable        Reason for Disposition   Injury and pain has not improved after 3 days   Injury is still painful or swollen after 2 weeks    Protocols used: Leg Injury-A-OH

## 2025-02-19 ENCOUNTER — OFFICE VISIT (OUTPATIENT)
Dept: FAMILY MEDICINE CLINIC | Facility: CLINIC | Age: 89
End: 2025-02-19
Payer: MEDICARE

## 2025-02-19 ENCOUNTER — HOSPITAL ENCOUNTER (OUTPATIENT)
Dept: GENERAL RADIOLOGY | Age: 89
Discharge: HOME | End: 2025-02-19
Attending: FAMILY MEDICINE
Payer: MEDICARE

## 2025-02-19 VITALS
OXYGEN SATURATION: 98 % | SYSTOLIC BLOOD PRESSURE: 138 MMHG | BODY MASS INDEX: 25.86 KG/M2 | RESPIRATION RATE: 18 BRPM | HEIGHT: 61 IN | HEART RATE: 67 BPM | TEMPERATURE: 98 F | DIASTOLIC BLOOD PRESSURE: 70 MMHG | WEIGHT: 137 LBS

## 2025-02-19 DIAGNOSIS — M79.641 HAND PAIN, RIGHT: Primary | ICD-10-CM

## 2025-02-19 DIAGNOSIS — M79.641 HAND PAIN, RIGHT: ICD-10-CM
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
## 2025-02-19 DIAGNOSIS — L03.115 CELLULITIS OF RIGHT LOWER LEG: ICD-10-CM

## 2025-02-19 PROCEDURE — 1159F MED LIST DOCD IN RCRD: CPT | Performed by: FAMILY MEDICINE

## 2025-02-19 PROCEDURE — 73130 X-RAY EXAM OF HAND: CPT | Performed by: FAMILY MEDICINE

## 2025-02-19 PROCEDURE — 99213 OFFICE O/P EST LOW 20 MIN: CPT | Performed by: FAMILY MEDICINE

## 2025-02-19 RX ORDER — CEPHALEXIN 500 MG/1
500 CAPSULE ORAL 2 TIMES DAILY
Qty: 20 CAPSULE | Refills: 0 | Status: SHIPPED | OUTPATIENT
Start: 2025-02-19

## 2025-02-19 NOTE — PROGRESS NOTES
Family Medicine Progress Note  ASSESSMENT AND PLAN:  James Ramírez is a 89 year old female who is here for:     James was seen today for leg pain, hand pain and other.    Diagnoses and all orders for this visit:    Hand pain, right  Discussed the need for an x-ray to rule out a fracture and ensure proper treatment.   -     XR HAND (MIN 3 VIEWS), RIGHT (CPT=73130); Future    Cellulitis of right lower leg  Soreness, swelling, redness, warmth, and tenderness indicate an infection. Lupus and rheumatoid arthritis may complicate healing. Discussed antibiotics and the importance of elevating feet to reduce swelling.   - Prescribe antibiotic for leg infection   - Advise to keep feet elevated   - Continue wearing compression stockings     -     cephALEXin 500 MG Oral Cap; Take 1 capsule (500 mg total) by mouth 2 (two) times daily.    Fall on same level from tripping         The patient indicates understanding of these issues and agrees to the plan.  Follow-Up: The patient is asked to Return if symptoms worsen or fail to improve.    .     Ann Flowers MD   02/19/25      CC: Leg Pain (Patient fail February 3rd and has redness after fall on left leg/), Hand Pain (right), and Other (Dr. Flowers would like to use a new tool that securely records your visit.  This will help her write her notes, so she can pay closer attention to you and less time on the computer/The following individual(s) verbally consented to be recorded using ambient AI listening technology and understand that they can each withdraw their consent to this listening technology at any point by asking the clinician to turn off or pause the recording://Patient name: James Ramírez /Additional names:  /  )    HPI:   James Ramírez is a 89 year old female who presents for   History of Present Illness  James Ramírez is an 89 year old female with lupus and rheumatoid arthritis who presents with a fall resulting in hand and leg injuries.    On February 3rd, she  slipped and fell at her front door, landing on a hardwood floor covered by a heavy grass area rug. She was wearing house slippers and was unable to catch herself during the fall. Initially, she did not realize the extent of her injuries. By February 8th, she noticed bruising on her right hand. Currently, she has increased soreness in the right hand. Saw her rheumatologist  2/10/25 and had bruising and tenderness near her thumb. No x-rays were performed but now feels the pain has gotten worse and feels it across her right hand    Regarding her leg, she did not notice any immediate issues post-fall. However, she later observed redness, pain and warmth in the area. The band on her knee-high stockings felt tight, which she initially attributed to the swelling. No difficulty walking due to the leg injury.  She has a cane at home but has not been using it regularly.    ALLERGY:     Allergies as of 02/19/2025 - Review Complete 02/19/2025   Allergen Reaction Noted    Thiazide-type diuretics OTHER (SEE COMMENTS) 08/12/2011    No known allergies ITCHING 10/25/2017    Ace inhibitors Coughing 08/06/2013    Adhesive tape ITCHING 10/25/2017    Amlodipine SWELLING 08/06/2013    Clonidine DIZZINESS 04/19/2017    Furosemide OTHER (SEE COMMENTS) 08/06/2013       MEDICATIONS:     Current Outpatient Medications   Medication Sig Dispense Refill    Hydroquinone 4 % External Cream Apply 1 Application topically daily. 30 g 3    losartan 100 MG Oral Tab Take 1 tablet (100 mg total) by mouth daily. 90 tablet 1    Labetalol HCl 300 MG Oral Tab Take 1 tablet (300 mg total) by mouth 2 (two) times daily. 180 tablet 1    atorvastatin 40 MG Oral Tab Take 1 tablet (40 mg total) by mouth nightly. 90 tablet 1    apixaban (ELIQUIS) 5 MG Oral Tab Take 1 tablet (5 mg total) by mouth 2 (two) times daily. 180 tablet 1    hydroxychloroquine (PLAQUENIL) 200 MG Oral Tab Take 1 tablet (200 mg total) by mouth daily. For Lupus. 30 tablet 3     Mometasone Furoate 0.1 % External Cream APPLY CREAM TOPICALLY TWICE DAILY AS NEEDED FOR IRRITATED SKIN LESION. 15 g 0    METAMUCIL 28 % Oral Powd Pack Take 1 packet by mouth every morning.      Cholecalciferol (VITAMIN D3 OR) Take by mouth.        Past Medical History:    Acute, but ill-defined, cerebrovascular disease    Arrhythmia    DX AIFIB 2017    Arthritis    Atypical chest pain    Cancer (HCC)    Cataract    Cutaneous lupus erythematosus    + ABRIL 1:320 titer     Endometrial cancer (HCC)    Fibrocystic breast changes    Hypertension    Hyponatremia    Localized, secondary osteoarthritis of the lower leg    Mild intermittent asthma without complication (HCC)    Osteoarthritis    Osteoarthritis of hip    Pneumonia due to organism    Stroke (HCC)    no residual effects    Tibial fracture    Unspecified essential hypertension    office BP runs higher than home.      Social History:  Social History     Socioeconomic History    Marital status:     Number of children: 0   Occupational History    Occupation: Retired      Comment: retired  from [employer]    Occupation: Taught nursery school   Tobacco Use    Smoking status: Former     Current packs/day: 0.00     Average packs/day: 0.6 packs/day for 45.0 years (25.0 ttl pk-yrs)     Types: Cigarettes     Start date: 1955     Quit date: 1995     Years since quittin.1     Passive exposure: Past    Smokeless tobacco: Never   Vaping Use    Vaping status: Never Used   Substance and Sexual Activity    Alcohol use: Yes     Alcohol/week: 8.0 standard drinks of alcohol     Types: 2 Glasses of wine, 6 Shots of liquor per week     Comment: 4x weekly Rum     Drug use: Never    Sexual activity: Yes     Partners: Male   Other Topics Concern    Blood Transfusions Yes     Comment: 14 yr ago with hip replacement (own blood)    Caffeine Concern No    Occupational Exposure No    Hobby Hazards No    Sleep Concern No    Stress Concern No     Weight Concern No    Special Diet No    Exercise Yes    Seat Belt Yes   Social History Narrative    Lives with , recent move to [place]. No children. No pets. No Denominational affiliation     Social Drivers of Health      [facility], [facility]    Housing Stability        REVIEW OF SYSTEMS:   A comprehensive 10 point review of systems was completed.  Pertinent positives and negatives noted in the the HPI.      EXAM:   /70   Pulse 67   Temp 98.2 °F (36.8 °C) (Temporal)   Resp 18   Ht 5' 1\" (1.549 m)   Wt 137 lb (62.1 kg)   SpO2 98%   BMI 25.89 kg/m²   GENERAL: well developed, well nourished,in no apparent distress  NECK: supple  LUNGS: clear to auscultation  CARDIO: RRR without murmur  HAND: right, ulnar deformity of fingers, no swelling, tenderness to palpation  EXTREMITY: right leg, erythema of lower leg, warmth and tenderness, negative Hoffmans      NOTE TO PATIENT: The 21st Century Cures Act makes clinical notes like these available to patients in the interest of transparency. Clinical notes are medical documents used by physicians and care providers to communicate with each other. These documents include medical language and terminology, abbreviations, and treatment information that may sound technical and at times possibly unfamiliar. In addition, at times, the verbiage may appear blunt or direct. These documents are one tool providers use to communicate relevant information and clinical opinions of the care providers in a way that allows common understanding of the clinical context.      Ann Flowers MD    02/19/25

## 2025-02-26 ENCOUNTER — NURSE TRIAGE (OUTPATIENT)
Dept: FAMILY MEDICINE CLINIC | Facility: CLINIC | Age: 89
End: 2025-02-26

## 2025-02-26 NOTE — TELEPHONE ENCOUNTER
Action Requested: Summary for Provider     []  Critical Lab, Recommendations Needed  [] Need Additional Advice  [x]   FYI    []   Need Orders  [] Need Medications Sent to Pharmacy  []  Other     SUMMARY: Pt scheduled of OV evaluation w/in 3 days per protocol.    Reason for call: No chief complaint on file.  Onset: Few weeks    Notes:  - Recent fall, has few abx doses left from 2/19 OV.  - LLE redness has improved, but both ankles now swollen. Has to wear back-less shoes, unable to pull up CHARLY hose.  - Skin intact throughout, no breakdown noted.  - Denies: fever, difficulty breathing, chest pain.  - Hx RA and Lupus, ankles/LE are always a little swollen but not like this.  - Pt schedule for evaluation.    Future Appointments   Date Time Provider Department Center   2/28/2025  8:20 AM Ann Flowers MD [facility]     Reason for Disposition   MILD swelling of both ankles (i.e., pedal edema) AND new-onset or worsening    Protocols used: Leg Swelling and Edema-A-OH

## 2025-02-28 ENCOUNTER — HOSPITAL ENCOUNTER (OUTPATIENT)
Age: 89
Discharge: HOME | End: 2025-02-28
Payer: MEDICARE

## 2025-02-28 ENCOUNTER — APPOINTMENT (OUTPATIENT)
Dept: ULTRASOUND IMAGING | Age: 89
End: 2025-02-28
Attending: NURSE PRACTITIONER
Payer: MEDICARE

## 2025-02-28 ENCOUNTER — APPOINTMENT (OUTPATIENT)
Dept: GENERAL RADIOLOGY | Age: 89
End: 2025-02-28
Attending: NURSE PRACTITIONER
Payer: MEDICARE

## 2025-02-28 ENCOUNTER — TELEPHONE (OUTPATIENT)
Dept: FAMILY MEDICINE CLINIC | Facility: CLINIC | Age: 89
End: 2025-02-28

## 2025-02-28 ENCOUNTER — PATIENT MESSAGE (OUTPATIENT)
Dept: FAMILY MEDICINE CLINIC | Facility: CLINIC | Age: 89
End: 2025-02-28

## 2025-02-28 VITALS
HEIGHT: 61 IN | RESPIRATION RATE: 22 BRPM | BODY MASS INDEX: 25.86 KG/M2 | SYSTOLIC BLOOD PRESSURE: 178 MMHG | DIASTOLIC BLOOD PRESSURE: 64 MMHG | TEMPERATURE: 98 F | WEIGHT: 137 LBS | OXYGEN SATURATION: 100 % | HEART RATE: 58 BPM

## 2025-02-28 DIAGNOSIS — S93.402A MODERATE LEFT ANKLE SPRAIN, INITIAL ENCOUNTER: Primary | ICD-10-CM

## 2025-02-28 DIAGNOSIS — M25.472 LEFT ANKLE SWELLING: ICD-10-CM

## 2025-02-28 PROCEDURE — 99203 OFFICE O/P NEW LOW 30 MIN: CPT

## 2025-02-28 PROCEDURE — 99214 OFFICE O/P EST MOD 30 MIN: CPT

## 2025-02-28 PROCEDURE — 93971 EXTREMITY STUDY: CPT | Performed by: NURSE PRACTITIONER

## 2025-02-28 PROCEDURE — 73610 X-RAY EXAM OF ANKLE: CPT | Performed by: NURSE PRACTITIONER

## 2025-02-28 NOTE — ED PROVIDER NOTES
Patient Seen in: [facility]      History     Chief Complaint   Patient presents with    Ankle Injury     Stated Complaint: Ankle Inj After Fall    Subjective:   89year-old female presents immediate care with left wrist pain and swelling.  Patient states she fell several weeks ago falling backwards on an outstretched hand she had x-rays done by her primary care she is concerned that now she has ankle swelling and tenderness denies any foot pain does report swelling and tenderness goes up to her distal calf.        Objective:     Past Medical History:    Acute, but ill-defined, cerebrovascular disease    Arrhythmia    DX AIFIB 1/2017    Arthritis    Atypical chest pain    Cancer (HCC)    Cataract    Cutaneous lupus erythematosus    + ABRIL 1:320 titer 2/13    Endometrial cancer (HCC)    Fibrocystic breast changes    Hypertension    Hyponatremia    Localized, secondary osteoarthritis of the lower leg    Mild intermittent asthma without complication (HCC)    Osteoarthritis    Osteoarthritis of hip    Pneumonia due to organism    Stroke (HCC)    no residual effects    Tibial fracture    Unspecified essential hypertension    office BP runs higher than home.              Past Surgical History:   Procedure Laterality Date    Breast lumpectomy Left 11/2009    benign fibroadenoma    D & c      Hernia surgery  02/06/2017    Hip replacement surgery  2001    Hysterectomy  1993    total hystero.    Ya biopsy stereo nodule 1 site left (cpt=19081) Left 09/2017    left exc to follow    Ya localization wire 1 site left (cpt=19281) Left 11/2017    benign-ADH    Ya localization wire 1 site right (cpt=19281) Right 1970    benign tumor    Oophorectomy Bilateral 1993    total hystero    [facility] 07/[facility]    Umbilical hernia repair N/A 02/06/2017    Procedure: HERNIA UMBILICAL REPAIR ADULT;  Surgeon: Treasure Vaughn MD;  Location:  MAIN OR                Social History      Socioeconomic History    Marital status:     Number of children: 0   Occupational History    Occupation: Retired      Comment: retired  from [employer]    Occupation: Taught nursery school   Tobacco Use    Smoking status: Former     Current packs/day: 0.00     Average packs/day: 0.6 packs/day for 45.0 years (25.0 ttl pk-yrs)     Types: Cigarettes     Start date: 1955     Quit date: 1995     Years since quittin.1     Passive exposure: Past    Smokeless tobacco: Never   Vaping Use    Vaping status: Never Used   Substance and Sexual Activity    Alcohol use: Yes     Alcohol/week: 8.0 standard drinks of alcohol     Types: 2 Glasses of wine, 6 Shots of liquor per week     Comment: 4x weekly Rum     Drug use: Never    Sexual activity: Yes     Partners: Male   Other Topics Concern    Blood Transfusions Yes     Comment: 14 yr ago with hip replacement (own blood)    Caffeine Concern No    Occupational Exposure No    Hobby Hazards No    Sleep Concern No    Stress Concern No    Weight Concern No    Special Diet No    Exercise Yes    Seat Belt Yes   Social History Narrative    Lives with , recent move to [place]. No children. No pets. No [employer] affiliation     Social Drivers of Health      [facility]    Housing Stability              Review of Systems   Constitutional: Negative.    Respiratory: Negative.     Cardiovascular:  Positive for leg swelling.   Gastrointestinal: Negative.    Skin: Negative.    Neurological: Negative.        Positive for stated complaint: Ankle Inj After Fall  Other systems are as noted in HPI.  Constitutional and vital signs reviewed.      All other systems reviewed and negative except as noted above.    Physical Exam     ED Triage Vitals [25 0915]   BP (!) 178/64   Pulse 58   Resp 22   Temp 98 °F (36.7 °C)   Temp src Oral   SpO2 100 %   O2 Device None (Room air)        Current Vitals:   Vital Signs  BP: (!) 178/64  Pulse: 58  Resp: 22  Temp: 98 °F (36.7 °C)  Temp src: Oral    Oxygen Therapy  SpO2: 100 %  O2 Device: None (Room air)        Physical Exam  Vitals and nursing note reviewed.   Constitutional:       General: She is not in acute distress.  HENT:      Head: Normocephalic.   Cardiovascular:      Rate and Rhythm: Normal rate.      Comments: Swelling surrounding the ankle, no foot pain or tenderness, tender to the distal calf  Pulmonary:      Effort: Pulmonary effort is normal.   Musculoskeletal:         General: Normal range of motion.      Left lower leg: 3+ Edema present.   Skin:     General: Skin is warm and dry.   Neurological:      General: No focal deficit present.      Mental Status: She is alert and oriented to person, place, and time.       ED Course   Labs Reviewed - No data to display  US VENOUS DOPPLER LEG LEFT - DIAG IMG (CPT=93971)    Result Date: 2/28/2025  PROCEDURE:  US VENOUS DOPPLER LEG LEFT - DIAG IMG (CPT=93971)  COMPARISON:  None.  INDICATIONS:  Pain and swelling, Ankle Inj After Fall  TECHNIQUE:  Real time, grey scale, and duplex ultrasound was used to evaluate the lower extremity venous system. B-mode two-dimensional images of the vascular structures, Doppler spectral analysis, and color flow.  Doppler imaging were performed.  The following veins were imaged:  Common, deep, and superficial femoral, popliteal, sapheno-femoral junction, posterior tibial veins, and the contralateral common femoral vein.  PATIENT STATED HISTORY: (As transcribed by Technologist)     FINDINGS:  EXTREMITY EXAMINED:  Left lower extremity SAPHENOFEMORAL JUNCTION:  No reflux. THROMBI:  None visible. COMPRESSION:  Normal compressibility, phasicity, and augmentation. OTHER:  Negative.            CONCLUSION:  1. No sonographic evidence for deep venous thrombosis involving the left lower extremity.   LOCATION:  [facility]    Dictated by (CST): Nehal Delgadillo MD on 2/28/2025 at 10:57 AM      Finalized by (CST): Nehal Delgadillo MD on 2/28/2025 at 10:57 AM       XR ANKLE (MIN 3 VIEWS), LEFT (CPT=73610)    Result Date: 2/28/2025  PROCEDURE:  XR ANKLE (MIN 3 VIEWS), LEFT (CPT=73610)  TECHNIQUE:  Three views were obtained.  COMPARISON:  None.  INDICATIONS:  Status post fall 2 weeks ago, left ankle injury, pain and swelling.  PATIENT STATED HISTORY: (As transcribed by Technologist)  Left lateral ankle pain and swelling. Fall 2 weeks ago.    FINDINGS:  Extensive soft tissue swelling about the ankle diffusely.  There is diffuse osseous demineralization.  There is no discrete evidence of acute fractures.  Ankle mortise is intact.  Joint spaces appear preserved.            CONCLUSION:  1. Extensive soft tissue swelling about the ankle diffusely. 2. There is osseous demineralization, suggesting osteopenia versus osteoporosis. 3. There is no discrete evidence of acute fractures.   LOCATION:  [facility]   Dictated by (CST): Rylan Coronel DO on 2/28/2025 at 10:41 AM     Finalized by (CST): Rylan Coronel DO on 2/28/2025 at 10:44 AM        [facility]      Medical Decision Making  Pertinent Labs & Imaging studies reviewed. (See chart for details).  Patient coming in with left ankle pain and swelling.   Differential diagnosis includes DVT, fracture, ankle sprain, extremity swelling     Patient is comfortable with plan of care.     Overall Pt looks good. Non-toxic, well-hydrated and in no respiratory distress. Vital signs are reassuring. Exam is reassuring. I do not believe pt requires and additional diagnostic studies or intervention. I believe pt can be discharged home to continue evaluation as an outpatient. Follow-up provider given. Discharge instructions given and reviewed. Return for any problems. All understand and agrees with the plan.        Problems Addressed:  Ankle swelling, right: acute illness or injury        Disposition and Plan     Clinical Impression:  1. Moderate left ankle sprain, initial encounter    2. Left  ankle swelling         Disposition:  Discharge  2/28/2025 11:02 am    Follow-up:  Ann Flowers MD  [address] [place] [address]  [phone]                Medications Prescribed:  Discharge Medication List as of 2/28/2025 11:06 AM              Supplementary Documentation:

## 2025-02-28 NOTE — TELEPHONE ENCOUNTER
Voicemail left for patient on home #.  Cell # voicemail is not set up.  My Chart message sent to her today's appointment is cancelled due to Doctor not being in the office today.    Her appointmet was for leg swelling based on a TE dated 2-17-25.  Does this need to be triaged again & not sure where to reschedule her for a 40 min appointment.

## 2025-02-28 NOTE — ED INITIAL ASSESSMENT (HPI)
Fell on 2/2, saw PCP on 2/19 prescribed antibiotic for redness. Today feels hard in the calf, tender to touch and discolored.

## 2025-02-28 NOTE — TELEPHONE ENCOUNTER
Called patient to ask about symptoms and to advise of cancelled appointment. She state there is no improvement. Left leg, especially around the ankle is swollen, feels tight and hard. \"Not comfortable to walk on\" Advised with her symptoms, no improvement and HX of RA and Lupus, strongly encourage evaluation at an Urgent Care with imaging. Provided facilities with imaging. Patient agreeable to plan.

## 2025-03-12 DIAGNOSIS — L93.0 DISCOID LUPUS: ICD-10-CM

## 2025-03-14 RX ORDER — MOMETASONE FUROATE 1 MG/G
CREAM TOPICAL
Qty: 45 G | Refills: 0 | Status: SHIPPED | OUTPATIENT
Start: 2025-03-14

## 2025-03-14 NOTE — TELEPHONE ENCOUNTER
Please review.  Protocol failed/has no protocol.    Last Office Visit: 02/19/2025  Please advise if refill is appropriate.    Requested Prescriptions     Pending Prescriptions Disp Refills    MOMETASONE FUROATE 0.1 % External Cream [Pharmacy Med Name: Mometasone Furoate 0.1 % External Cream] 15 g 0     Sig: APPLY CREAM TOPICALLY TWICE DAILY AS NEEDED FOR IRRITATED SKIN LESION.

## 2025-04-02 ENCOUNTER — TELEPHONE (OUTPATIENT)
Dept: FAMILY MEDICINE CLINIC | Facility: CLINIC | Age: 89
End: 2025-04-02

## 2025-04-02 NOTE — TELEPHONE ENCOUNTER
Last appointment 2/19/25.  Patient called with condition update. She continues to have swelling, discoloration, skin feels hard on right calf. Symptoms started after a fall on 2/3/25. Xrays were normal. No fracture of DVT. Area is about the size of a baseball, not warm to the touch. Patient denies SOB, chest pain. Patient was prescribed several rounds of cephalexin for cellulitis. Patient saw podiatry who advised to stop abx as it did not appear to be cellulitis.     Patient has been using ice, heat, elevation. Hard to put compression stockings on, but she could wrap with ace bandage if recommended. Patient has appointment with podiatry on 4/11.     Any further recs for patient?  Routing to in-office provider.

## 2025-04-02 NOTE — TELEPHONE ENCOUNTER
Noted patient was seen in the urgent care on 2/28/2025.  Had venous Dopplers of left lower extremity: Negative for DVT.  Left ankle x-ray with extensive soft tissue swelling, no fracture noted.  Symptoms per message are in the right calf.  Please clarify side sxs are on.  Patient can apply warm compress twice daily to area of swelling.  Not sure if she is treating the swelling of her left ankle, for this, compression with Ace wrap or ankle brace may help.  Elevate.  Is the area of swelling on R calf new, size of baseball sounds pretty big. Has it been getting smaller or bigger ? Any f/c.   Please triage further.   Can schedule appt for eval with any available provider in office for [facility].

## 2025-04-03 NOTE — TELEPHONE ENCOUNTER
Spoke with patient and confirmed that it is her LEFT calf, the same as the original injury. She states that the width of the swelling is the size of a baseball however does not bulge like the size of a baseball. Denies redness and swelling slightly less than yesterday. She followed up with Podiatry ([facility]) 3/11/25 as recommended by ED and has a follow up appointment on 4/11/25. She was given the same recommendations as Dr. Rene. Podiatry did note that the swelling was/is injury related. She has been using ice more than heat. Advised to use heat as recommended by Dr. Rene along with ace wrap and elevation. Also advised to use assistive device while ambulating, states she has a cane. She will call back if anything worsens however agreeable with plan and will follow  up with podiatry on 4/11/25.    Dr. Rene - patient confirmed LEFT calf, her original injury. The swelling is the width of a baseball however does not bulge like the size of a baseball.  Advised her of your recommendations. Has follow-up appointment with Podiatry on 4/11/25. Patient agreeable with plan

## 2025-05-12 ENCOUNTER — OFFICE VISIT (OUTPATIENT)
Dept: FAMILY MEDICINE CLINIC | Facility: CLINIC | Age: 89
End: 2025-05-12
Payer: MEDICARE

## 2025-05-12 ENCOUNTER — LAB ENCOUNTER (OUTPATIENT)
Dept: LAB | Age: 89
End: 2025-05-12
Attending: FAMILY MEDICINE
Payer: MEDICARE

## 2025-05-12 VITALS
BODY MASS INDEX: 24.55 KG/M2 | RESPIRATION RATE: 18 BRPM | TEMPERATURE: 98 F | DIASTOLIC BLOOD PRESSURE: 72 MMHG | HEIGHT: 61 IN | OXYGEN SATURATION: 98 % | SYSTOLIC BLOOD PRESSURE: 136 MMHG | HEART RATE: 54 BPM | WEIGHT: 130 LBS

## 2025-05-12 DIAGNOSIS — E53.8 VITAMIN B12 DEFICIENCY: ICD-10-CM

## 2025-05-12 DIAGNOSIS — M81.0 AGE-RELATED OSTEOPOROSIS WITHOUT CURRENT PATHOLOGICAL FRACTURE: ICD-10-CM

## 2025-05-12 DIAGNOSIS — E55.9 VITAMIN D DEFICIENCY: ICD-10-CM

## 2025-05-12 DIAGNOSIS — J44.89 ASTHMA WITH COPD (CHRONIC OBSTRUCTIVE PULMONARY DISEASE) (HCC): ICD-10-CM

## 2025-05-12 DIAGNOSIS — E04.2 MULTIPLE THYROID NODULES: ICD-10-CM

## 2025-05-12 DIAGNOSIS — Z00.00 ENCOUNTER FOR ANNUAL HEALTH EXAMINATION: Primary | ICD-10-CM

## 2025-05-12 DIAGNOSIS — D68.69 OTHER THROMBOPHILIA (HCC): ICD-10-CM

## 2025-05-12 DIAGNOSIS — Z13.6 SCREENING FOR CARDIOVASCULAR CONDITION: ICD-10-CM

## 2025-05-12 DIAGNOSIS — I48.0 PAROXYSMAL ATRIAL FIBRILLATION (HCC): ICD-10-CM

## 2025-05-12 DIAGNOSIS — I10 ESSENTIAL HYPERTENSION: ICD-10-CM

## 2025-05-12 DIAGNOSIS — D50.9 IRON DEFICIENCY ANEMIA, UNSPECIFIED IRON DEFICIENCY ANEMIA TYPE: ICD-10-CM

## 2025-05-12 DIAGNOSIS — L93.0 DISCOID LUPUS: ICD-10-CM

## 2025-05-12 DIAGNOSIS — M12.00 JACCOUD'S ARTHROPATHY (HCC): ICD-10-CM

## 2025-05-12 DIAGNOSIS — E78.00 PURE HYPERCHOLESTEROLEMIA: ICD-10-CM

## 2025-05-12 DIAGNOSIS — I27.20 PULMONARY HYPERTENSION, MODERATE TO SEVERE (HCC): ICD-10-CM

## 2025-05-12 DIAGNOSIS — Z79.01 CHRONIC ANTICOAGULATION: ICD-10-CM

## 2025-05-12 DIAGNOSIS — Z13.1 SCREENING FOR DIABETES MELLITUS (DM): ICD-10-CM

## 2025-05-12 DIAGNOSIS — N18.31 CHRONIC KIDNEY DISEASE, STAGE 3A (HCC): ICD-10-CM

## 2025-05-12 LAB
ALBUMIN SERPL-MCNC: 4.5 G/DL (ref 3.2–4.8)
ALBUMIN/GLOB SERPL: 1.3 {RATIO} (ref 1–2)
ALP LIVER SERPL-CCNC: 84 U/L (ref 55–142)
ALT SERPL-CCNC: 15 U/L (ref 10–49)
ANION GAP SERPL CALC-SCNC: 8 MMOL/L (ref 0–18)
AST SERPL-CCNC: 29 U/L (ref ?–34)
BASOPHILS # BLD AUTO: 0.03 X10(3) UL (ref 0–0.2)
BASOPHILS NFR BLD AUTO: 0.7 %
BILIRUB SERPL-MCNC: 1.2 MG/DL (ref 0.2–1.1)
BUN BLD-MCNC: 18 MG/DL (ref 9–23)
CALCIUM BLD-MCNC: 9.4 MG/DL (ref 8.7–10.6)
CHLORIDE SERPL-SCNC: 98 MMOL/L (ref 98–112)
CHOLEST SERPL-MCNC: 170 MG/DL (ref ?–200)
CO2 SERPL-SCNC: 24 MMOL/L (ref 21–32)
CREAT BLD-MCNC: 1.04 MG/DL (ref 0.55–1.02)
DEPRECATED HBV CORE AB SER IA-ACNC: 98 NG/ML (ref 50–306)
EGFRCR SERPLBLD CKD-EPI 2021: 52 ML/MIN/1.73M2 (ref 60–?)
EOSINOPHIL # BLD AUTO: 0.03 X10(3) UL (ref 0–0.7)
EOSINOPHIL NFR BLD AUTO: 0.7 %
ERYTHROCYTE [DISTWIDTH] IN BLOOD BY AUTOMATED COUNT: 11.9 %
EST. AVERAGE GLUCOSE BLD GHB EST-MCNC: 120 MG/DL (ref 68–126)
FASTING PATIENT LIPID ANSWER: NO
FASTING STATUS PATIENT QL REPORTED: NO
GLOBULIN PLAS-MCNC: 3.4 G/DL (ref 2–3.5)
GLUCOSE BLD-MCNC: 96 MG/DL (ref 70–99)
HBA1C MFR BLD: 5.8 % (ref ?–5.7)
HCT VFR BLD AUTO: 35.7 % (ref 35–48)
HDLC SERPL-MCNC: 112 MG/DL (ref 40–59)
HGB BLD-MCNC: 12.3 G/DL (ref 12–16)
IMM GRANULOCYTES # BLD AUTO: 0.01 X10(3) UL (ref 0–1)
IMM GRANULOCYTES NFR BLD: 0.2 %
IRON SATN MFR SERPL: 25 % (ref 15–50)
IRON SERPL-MCNC: 79 UG/DL (ref 50–170)
LDLC SERPL CALC-MCNC: 48 MG/DL (ref ?–100)
LYMPHOCYTES # BLD AUTO: 0.99 X10(3) UL (ref 1–4)
LYMPHOCYTES NFR BLD AUTO: 23.3 %
MCH RBC QN AUTO: 33.5 PG (ref 26–34)
MCHC RBC AUTO-ENTMCNC: 34.5 G/DL (ref 31–37)
MCV RBC AUTO: 97.3 FL (ref 80–100)
MONOCYTES # BLD AUTO: 0.4 X10(3) UL (ref 0.1–1)
MONOCYTES NFR BLD AUTO: 9.4 %
NEUTROPHILS # BLD AUTO: 2.78 X10 (3) UL (ref 1.5–7.7)
NEUTROPHILS # BLD AUTO: 2.78 X10(3) UL (ref 1.5–7.7)
NEUTROPHILS NFR BLD AUTO: 65.7 %
NONHDLC SERPL-MCNC: 58 MG/DL (ref ?–130)
OSMOLALITY SERPL CALC.SUM OF ELEC: 272 MOSM/KG (ref 275–295)
PLATELET # BLD AUTO: 207 10(3)UL (ref 150–450)
POTASSIUM SERPL-SCNC: 4.4 MMOL/L (ref 3.5–5.1)
PROT SERPL-MCNC: 7.9 G/DL (ref 5.7–8.2)
RBC # BLD AUTO: 3.67 X10(6)UL (ref 3.8–5.3)
SODIUM SERPL-SCNC: 130 MMOL/L (ref 136–145)
TOTAL IRON BINDING CAPACITY: 318 UG/DL (ref 250–425)
TRANSFERRIN SERPL-MCNC: 236 MG/DL (ref 250–380)
TRIGL SERPL-MCNC: 49 MG/DL (ref 30–149)
VIT B12 SERPL-MCNC: 263 PG/ML (ref 211–911)
VIT D+METAB SERPL-MCNC: 32.8 NG/ML (ref 30–100)
VLDLC SERPL CALC-MCNC: 7 MG/DL (ref 0–30)
WBC # BLD AUTO: 4.2 X10(3) UL (ref 4–11)

## 2025-05-12 PROCEDURE — 36415 COLL VENOUS BLD VENIPUNCTURE: CPT

## 2025-05-12 PROCEDURE — 82607 VITAMIN B-12: CPT

## 2025-05-12 PROCEDURE — 85025 COMPLETE CBC W/AUTO DIFF WBC: CPT

## 2025-05-12 PROCEDURE — 82306 VITAMIN D 25 HYDROXY: CPT

## 2025-05-12 PROCEDURE — 80061 LIPID PANEL: CPT

## 2025-05-12 PROCEDURE — 83540 ASSAY OF IRON: CPT

## 2025-05-12 PROCEDURE — 83550 IRON BINDING TEST: CPT

## 2025-05-12 PROCEDURE — 83036 HEMOGLOBIN GLYCOSYLATED A1C: CPT

## 2025-05-12 PROCEDURE — 82728 ASSAY OF FERRITIN: CPT

## 2025-05-12 PROCEDURE — 80053 COMPREHEN METABOLIC PANEL: CPT

## 2025-05-12 RX ORDER — LOSARTAN POTASSIUM 100 MG/1
100 TABLET ORAL DAILY
Qty: 90 TABLET | Refills: 1 | Status: SHIPPED | OUTPATIENT
Start: 2025-05-12

## 2025-05-12 RX ORDER — ATORVASTATIN CALCIUM 40 MG/1
40 TABLET, FILM COATED ORAL NIGHTLY
Qty: 90 TABLET | Refills: 1 | Status: SHIPPED | OUTPATIENT
Start: 2025-05-12

## 2025-05-12 RX ORDER — MOMETASONE FUROATE 1 MG/G
1 OINTMENT TOPICAL 2 TIMES DAILY PRN
Qty: 45 G | Refills: 0 | Status: SHIPPED | OUTPATIENT
Start: 2025-05-12

## 2025-05-12 RX ORDER — LABETALOL 300 MG/1
300 TABLET, FILM COATED ORAL 2 TIMES DAILY
Qty: 180 TABLET | Refills: 1 | Status: SHIPPED | OUTPATIENT
Start: 2025-05-12

## 2025-05-12 NOTE — ASSESSMENT & PLAN NOTE
Well controlled with the current dose of medication  -  continue the same dose of medication  -  DASH diet, limit salt to less than 2000 mg  -  Goal BP less than 130/80    Orders:    Labetalol HCl; Take 1 tablet (300 mg total) by mouth 2 (two) times daily.  Dispense: 180 tablet; Refill: 1    Losartan Potassium; Take 1 tablet (100 mg total) by mouth daily.  Dispense: 90 tablet; Refill: 1

## 2025-05-12 NOTE — ASSESSMENT & PLAN NOTE
Atrial fibrillation managed with Apixaban. Discussed hematoma risks and anticoagulation effects.  - Continue Apixaban 5 mg oral bid.  - f/u with cardiology for management  Orders:    Apixaban; Take 1 tablet (5 mg total) by mouth 2 (two) times daily.  Dispense: 180 tablet; Refill: 1

## 2025-05-12 NOTE — ASSESSMENT & PLAN NOTE
Well managed with Plaquenil  - continue the same dose of medication  - follow up with rheumatologist for management  Orders:    Mometasone Furoate; Apply 1 Application topically 2 (two) times daily as needed.  Dispense: 45 g; Refill: 0

## 2025-05-12 NOTE — ASSESSMENT & PLAN NOTE
Elevated pulmonary artery systolic pressure  Stable compared to previous EKG  EF preserved and she is asymptomatic with compensated cardiac function  - f/u with cardiology for management

## 2025-05-12 NOTE — ASSESSMENT & PLAN NOTE
Orders:    Apixaban; Take 1 tablet (5 mg total) by mouth 2 (two) times daily.  Dispense: 180 tablet; Refill: 1

## 2025-05-12 NOTE — ASSESSMENT & PLAN NOTE
Pain controlled  - continue the same dose of medication  - f/u with rheumatologist for managment       Health Maintenance   Topic Date Due    Zoster Vaccines (2 of 3) 03/06/2012    Annual Well Visit  01/01/2025    COVID-19 Vaccine (7 - 2024-25 season) 06/12/2025 (Originally 4/10/2025)    Influenza Vaccine  Completed    Annual Depression Screening  Completed    Fall Risk Screening (Annual)  Completed    Pneumococcal Vaccine: 50+ Years  Completed    Meningococcal B Vaccine  Aged Out       The patient indicates understanding of these issues and agrees to the plan.  Reinforced healthy diet, lifestyle, and exercise.      Return in 1 year (on 5/12/2026).     Ann Flowers MD, 5/12/2025

## 2025-05-12 NOTE — ASSESSMENT & PLAN NOTE
DEXA done last year consistent with osteoporosis  - continue the same dose of medication  - continue calcium and vitamin D  - use cane for balance and to avoid falls  Orders:    Alendronate Sodium; Take 1 tablet (70 mg total) by mouth every 7 days.  Dispense: 12 tablet; Refill: 3

## 2025-05-12 NOTE — ASSESSMENT & PLAN NOTE
Secondary to chronic hypertension  - good blood pressure control  - limit salt in diet.  Orders:    Comp Metabolic Panel (14); Future; Expected date: 05/12/2025

## 2025-05-12 NOTE — ASSESSMENT & PLAN NOTE
Managed well on Eliquis  - continue Eliquis  Orders:    CBC With Differential With Platelet; Future; Expected date: 05/12/2025

## 2025-05-12 NOTE — PROGRESS NOTES
Assessment & Plan  Encounter for annual health examination         Chronic kidney disease, stage 3a (HCC)  Secondary to chronic hypertension  - good blood pressure control  - limit salt in diet.  Orders:    Comp Metabolic Panel (14); Future; Expected date: 05/12/2025    Other thrombophilia (HCC)  Managed well on Eliquis  - continue Eliquis  Orders:    CBC With Differential With Platelet; Future; Expected date: 05/12/2025    Screening for diabetes mellitus (DM)    Orders:    Hemoglobin A1C; Future; Expected date: 05/12/2025    Screening for cardiovascular condition    Orders:    Lipid Panel; Future; Expected date: 05/12/2025    Vitamin D deficiency    Orders:    Vitamin D; Future; Expected date: 05/12/2025    Vitamin B12 deficiency    Orders:    Vitamin B12; Future; Expected date: 05/12/2025    Iron deficiency anemia, unspecified iron deficiency anemia type  Currently asymptomatic  - repeat labs  Orders:    Iron And Tibc; Future; Expected date: 05/12/2025    Ferritin; Future; Expected date: 05/12/2025    Pure hypercholesterolemia  Well controlled with the current dose of medication  - continue the same dose of medication  - limit refined sugars and carbs in diet.  Orders:    Atorvastatin Calcium; Take 1 tablet (40 mg total) by mouth nightly.  Dispense: 90 tablet; Refill: 1    Essential hypertension  Well controlled with the current dose of medication  -  continue the same dose of medication  -  DASH diet, limit salt to less than 2000 mg  -  Goal BP less than 130/80    Orders:    Labetalol HCl; Take 1 tablet (300 mg total) by mouth 2 (two) times daily.  Dispense: 180 tablet; Refill: 1    Losartan Potassium; Take 1 tablet (100 mg total) by mouth daily.  Dispense: 90 tablet; Refill: 1    Chronic anticoagulation    Orders:    Apixaban; Take 1 tablet (5 mg total) by mouth 2 (two) times daily.  Dispense: 180 tablet; Refill: 1    Paroxysmal atrial fibrillation (HCC)  Atrial fibrillation managed with Apixaban. Discussed  hematoma risks and anticoagulation effects.  - Continue Apixaban 5 mg oral bid.  - f/u with cardiology for management  Orders:    Apixaban; Take 1 tablet (5 mg total) by mouth 2 (two) times daily.  Dispense: 180 tablet; Refill: 1    Discoid lupus  Well managed with Plaquenil  - continue the same dose of medication  - follow up with rheumatologist for management  Orders:    Mometasone Furoate; Apply 1 Application topically 2 (two) times daily as needed.  Dispense: 45 g; Refill: 0    Pulmonary hypertension, moderate to severe (HCC)  Elevated pulmonary artery systolic pressure  Stable compared to previous EKG  EF preserved and she is asymptomatic with compensated cardiac function  - f/u with cardiology for management       Age-related osteoporosis without current pathological fracture  DEXA done last year consistent with osteoporosis  - continue the same dose of medication  - continue calcium and vitamin D  - use cane for balance and to avoid falls  Orders:    Alendronate Sodium; Take 1 tablet (70 mg total) by mouth every 7 days.  Dispense: 12 tablet; Refill: 3    Multiple thyroid nodules  stable       Jaccoud's arthropathy (HCC)  Pain controlled  - continue the same dose of medication  - f/u with rheumatologist for managment       Health Maintenance   Topic Date Due    Zoster Vaccines (2 of 3) 03/06/2012    Annual Well Visit  01/01/2025    COVID-19 Vaccine (7 - 2024-25 season) 06/12/2025 (Originally 4/10/2025)    Influenza Vaccine  Completed    Annual Depression Screening  Completed    Fall Risk Screening (Annual)  Completed    Pneumococcal Vaccine: 50+ Years  Completed    Meningococcal B Vaccine  Aged Out       The patient indicates understanding of these issues and agrees to the plan.  Reinforced healthy diet, lifestyle, and exercise.      Return in 1 year (on 5/12/2026).     Ann Flowers MD, 5/12/2025       Subjective:   James Ramírez is a 89 year old female who presents for a MA AHA (Medicare Advantage Annual  Health Assessment) and Subsequent Annual Wellness visit (Pt already had Initial Annual Wellness) and scheduled follow up of multiple significant but stable problems.   History of Present Illness  History of Present Illness  James Ramírez is an 89-year-old female with atrial fibrillation and arthritis who presents for a wellness visit.    She experienced a fall on February 3rd, resulting in leg trauma with initial swelling and bruising. An urgent care visit included an x-ray and ultrasound, which showed no fracture or blood clot. The swelling persisted and darkened but resolved over three months. Currently, the swelling has decreased significantly, with no pain, though the area remains slightly discolored.    She experiences swelling in both feet and takes hydrochlorothiazide 12.5 mg every other day as needed, which helps reduce the swelling. However, she had a brief fainting spell after taking the medication, She continues to monitor her symptoms and adjust her medication use accordingly.    Her rheumatoid arthritis is managed with Tylenol, as she is no longer taking Plaquenil. She has difficulty with  and performing tasks such as buttoning and handling small objects due to finger stiffness. Despite these challenges, she manages cooking and other activities at home. She uses a cane for balance, which has been affected by her leg issues, and reports no improvement in balance recently. Follows up with her rheumatologist every 6 months.    She has a history of atrial fibrillation and is on Eliquis twice daily. she recently had an echocardiogram in March. Follows up with cardiologist  and has an upcoming appointment.    She also takes labetalol and Losartan for hypertension and need a refill.    Takes atorvastatin, complaint with medication and diet.    Saw surgeon last year for ventral hernia   History/Other:   Fall Risk Assessment:   She has been screened for Falls and is High Risk. Fall Prevention  information provided to patient in After Visit Summary.    Do you feel unsteady when standing or walking?: (Patient-Rptd) Yes  Do you worry about falling?: (Patient-Rptd) Yes  Have you fallen in the past year?: (Patient-Rptd) Yes  How many times have you fallen?: (Patient-Rptd) (P) 3  Were you injured?: (Patient-Rptd) (P) Yes     Cognitive Assessment:   She had a completely normal cognitive assessment - see flowsheet entries     Functional Ability/Status:   James Ramírez has some abnormal functions as listed below:  She has Dressing and/or Bathing issues based on screening of functional status.  Difficulty dressing or bathing?: (Patient-Rptd) Yes  Bathing or Showering: (Patient-Rptd) Able without help  Dressing: (Patient-Rptd) Able without help  She has Driving difficulties based on screening of functional status. She has difficulties Managing Money/Bills based on screening of functional status.She has difficulties Shopping for Groceries based on screening of functional status. She has Vision problems based on screening of functional status. She has Walking problems based on screening of functional status. She has problems with Daily Activities based on screening of functional status.       Depression Screening (PHQ):  PHQ-2 SCORE: 0  , done 5/8/2025             Advanced Directives:   She does NOT have a Living Will. [Do you have a living will?: (Patient-Rptd) Yes]    She has a Power of  for Health Care on file in [facility].  Patient has Advance Care Planning documents present in EMR. Reviewed documents with patient (and family/surrogate if present).      Patient Active Problem List   Diagnosis    Cataract    Status post total replacement of right hip    Occlusion of right carotid artery    Atrial fibrillation (HCC)    Essential hypertension    Chronic anticoagulation    H/O ischemic right PCA stroke    Macrocytic anemia with vitamin B12 deficiency    Thoracic aorta atherosclerosis    SS-A antibody positive     Discoid lupus    Pulmonary hypertension, moderate to severe (HCC)    Osteoporosis without current pathological fracture    Asthma with COPD (chronic obstructive pulmonary disease) (HCC)    Localized osteoarthritis of left knee    Seborrheic keratoses, inflamed    Stenosis of right carotid artery    Chronic kidney disease, stage 3a (HCC)    Other thrombophilia ([facility])    Multiple thyroid nodules    Ulnar deviation of fingers of both hands    Jaccoud's arthropathy (HCC)    Rheumatoid factor positive    Hyperlipidemia    Polyarthralgia    Chronic fatigue     Allergies:  She is allergic to thiazide-type diuretics, no known allergies, ace inhibitors, adhesive tape, amlodipine, clonidine, and furosemide.    Current Medications:  Outpatient Medications Marked as Taking for the 5/12/25 encounter (Office Visit) with Ann Flowers MD   Medication Sig    alendronate 70 MG Oral Tab Take 1 tablet (70 mg total) by mouth every 7 days.    atorvastatin 40 MG Oral Tab Take 1 tablet (40 mg total) by mouth nightly.    Labetalol HCl 300 MG Oral Tab Take 1 tablet (300 mg total) by mouth 2 (two) times daily.    losartan 100 MG Oral Tab Take 1 tablet (100 mg total) by mouth daily.    apixaban (ELIQUIS) 5 MG Oral Tab Take 1 tablet (5 mg total) by mouth 2 (two) times daily.    mometasone 0.1 % External Ointment Apply 1 Application topically 2 (two) times daily as needed.    MOMETASONE FUROATE 0.1 % External Cream APPLY CREAM TOPICALLY TWICE DAILY AS NEEDED FOR IRRITATED SKIN LESION.    Hydroquinone 4 % External Cream Apply 1 Application topically daily.    METAMUCIL 28 % Oral Powd Pack Take 1 packet by mouth every morning.    Cholecalciferol (VITAMIN D3 OR) Take by mouth.       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  Surgical History:  She  has a past surgical history that includes Breast lumpectomy (Left, 11/2009); Umbilical hernia repair (N/A, 02/06/2017); hernia surgery (2017); total hip replacement (Right, 07/2000); hysterectomy (1993); oophorectomy (Bilateral, 1993); vivian localization wire 1 site left (cpt=19281) (Left, 11/2017); vivian localization wire 1 site right (cpt=19281) (Right, 1970); vivian biopsy stereo nodule 1 site left (cpt=19081) (Left, 09/2017); d & c; and hip replacement surgery (2001).   Family History:  Her family history includes Diabetes in her brother, brother, father, and mother; Heart Disorder in her mother.  Social History:  She  reports that she quit smoking about 30 years ago. Her smoking use included cigarettes. She started smoking about 70 years ago. She has a 25 pack-year smoking history. She has been exposed to tobacco smoke. She has never used smokeless tobacco. She reports current alcohol use of about 8.0 standard drinks of alcohol per week. She reports that she does not use drugs.    Tobacco:  She smoked tobacco in the past but quit greater than 12 months ago.  Tobacco Use[1]     CAGE Alcohol Screen:   CAGE screening score of 0 on 5/8/2025, showing low risk of alcohol abuse.      Patient Care Team:  Ann Flowers MD as PCP - General (Family Medicine)  Toni Aguilar MD (CARDIOLOGY)  Nasir Hinton DO as Consulting Physician (NEUROLOGY)  Magy Hoang PT as Physical Therapist  Josiah Wyman OT as Occupational Therapist (Occupational Therapist)  Mychart, Generic Provider    Review of Systems   Constitutional:  Negative for  appetite change, fatigue, fever and unexpected weight change.   HENT:  Negative for congestion, ear pain, hearing loss and sore throat.    Eyes:  Negative for discharge, redness and visual disturbance.   Respiratory:  Negative for cough, chest tightness and shortness of breath.    Cardiovascular:  Positive for leg swelling. Negative for chest pain and palpitations.   Gastrointestinal:  Negative for abdominal pain, blood in stool, constipation and nausea.   Endocrine: Negative for cold intolerance, heat intolerance and polyuria.   Genitourinary:  Negative for difficulty urinating, dysuria, frequency and urgency.   Musculoskeletal:  Positive for arthralgias and gait problem. Negative for joint swelling and myalgias.   Skin:  Positive for color change. Negative for rash.   Allergic/Immunologic: Negative for food allergies.   Neurological:  Negative for dizziness, weakness, numbness and headaches.   Hematological:  Negative for adenopathy. Does not bruise/bleed easily.   Psychiatric/Behavioral:  Negative for dysphoric mood and sleep disturbance. The patient is not nervous/anxious.           Objective:   Physical Exam  Constitutional:       General: She is not in acute distress.     Appearance: Normal appearance. She is well-developed and normal weight.   HENT:      Head: Normocephalic and atraumatic.      Right Ear: Tympanic membrane, ear canal and external ear normal.      Left Ear: Tympanic membrane, ear canal and external ear normal.      Nose: Nose normal.      Mouth/Throat:      Mouth: Mucous membranes are moist.      Pharynx: Oropharynx is clear.   Eyes:      Extraocular Movements: Extraocular movements intact.      Conjunctiva/sclera: Conjunctivae normal.      Pupils: Pupils are equal, round, and reactive to light.   Neck:      Thyroid: No thyromegaly.   Cardiovascular:      Rate and Rhythm: Normal rate and regular rhythm.      Heart sounds: Normal heart sounds. No murmur heard.  Pulmonary:      Effort: Pulmonary  effort is normal. No respiratory distress.      Breath sounds: Normal breath sounds.   Chest:      Chest wall: No tenderness.   Abdominal:      General: Bowel sounds are normal. There is no distension.      Palpations: Abdomen is soft. There is no hepatomegaly, splenomegaly or mass.      Tenderness: There is no abdominal tenderness.      Hernia: A hernia is present.   Musculoskeletal:         General: Normal range of motion.      Cervical back: Normal range of motion and neck supple.      Right lower leg: Edema present.      Left lower leg: Edema present.   Lymphadenopathy:      Cervical: No cervical adenopathy.   Skin:     General: Skin is warm.      Findings: No lesion or rash.   Neurological:      General: No focal deficit present.      Mental Status: She is alert and oriented to person, place, and time.      Cranial Nerves: No cranial nerve deficit.      Sensory: No sensory deficit.      Motor: No weakness.      Coordination: Coordination normal.      Gait: Gait normal.      Deep Tendon Reflexes: Reflexes are normal and symmetric. Reflexes normal.   Psychiatric:         Mood and Affect: Mood normal.         Behavior: Behavior normal.         Thought Content: Thought content normal.         Judgment: Judgment normal.            /72   Pulse 54   Temp 97.9 °F (36.6 °C) (Temporal)   Resp 18   Ht 5' 1\" (1.549 m)   Wt 130 lb (59 kg)   SpO2 98%   BMI 24.56 kg/m²  Estimated body mass index is 24.56 kg/m² as calculated from the following:    Height as of this encounter: 5' 1\" (1.549 m).    Weight as of this encounter: 130 lb (59 kg).    Medicare Hearing Assessment:   Hearing Screening    Screening Method: Finger Rub  Finger Rub Result: Pass         Visual Acuity:   Right Eye Visual Acuity: Uncorrected Right Eye Chart Acuity: 20/25   Left Eye Visual Acuity: Uncorrected Left Eye Chart Acuity: 20/25   Both Eyes Visual Acuity: Uncorrected Both Eyes Chart Acuity: 20/25   Able To Tolerate Visual Acuity: Yes           Supplementary Documentation:   General Health:  In the past six months, have you lost more than 10 pounds without trying?: (Patient-Rptd) 2 - No  Has your appetite been poor?: (Patient-Rptd) No  Type of Diet: (Patient-Rptd) Balanced  How does the patient maintain a good energy level?: Other, Appropriate Exercise  How would you describe your daily physical activity?: (Patient-Rptd) Light  How would you describe your current health state?: (Patient-Rptd) Fair  How do you maintain positive mental well-being?: (Patient-Rptd) Social Interaction, Visiting Family  On a scale of 0 to 10, with 0 being no pain and 10 being severe pain, what is your pain level?: (Patient-Rptd) 6 - (Moderate)  In the past six months, have you experienced urine leakage?: (Patient-Rptd) 0-No  At any time do you feel concerned for the safety/well-being of yourself and/or your children, in your home or elsewhere?: (Patient-Rptd) Yes  Have you had any immunizations at another office such as Influenza, Hepatitis B, Tetanus, or Pneumococcal?: (Patient-Rptd) Yes       The  [employer] Cures Act makes medical notes like these available to patients in the interest of transparency. Please be advised this is a medical document. Medical documents are intended to carry relevant information, facts as evident, and the clinical opinion of the practitioner. The medical note is intended as peer to peer communication and may appear blunt or direct. It is written in medical language and may contain abbreviations or verbiage that are unfamiliar.            [1]   Social History  Tobacco Use   Smoking Status Former    Current packs/day: 0.00    Average packs/day: 0.6 packs/day for 45.0 years (25.0 ttl pk-yrs)    Types: Cigarettes    Start date: 1955    Quit date: 1995    Years since quittin.4    Passive exposure: Past   Smokeless Tobacco Never

## 2025-05-12 NOTE — ASSESSMENT & PLAN NOTE
Well controlled with the current dose of medication  - continue the same dose of medication  - limit refined sugars and carbs in diet.  Orders:    Atorvastatin Calcium; Take 1 tablet (40 mg total) by mouth nightly.  Dispense: 90 tablet; Refill: 1

## 2025-05-13 RX ORDER — ALENDRONATE SODIUM 70 MG/1
70 TABLET ORAL
Qty: 12 TABLET | Refills: 3 | Status: SHIPPED | OUTPATIENT
Start: 2025-05-13

## 2025-05-18 DIAGNOSIS — R60.0 BILATERAL EDEMA OF LOWER EXTREMITY: ICD-10-CM

## 2025-05-18 DIAGNOSIS — I10 ESSENTIAL HYPERTENSION: ICD-10-CM

## 2025-05-20 RX ORDER — HYDROCHLOROTHIAZIDE 12.5 MG/1
12.5 TABLET ORAL DAILY PRN
Qty: 90 TABLET | Refills: 0 | Status: SHIPPED | OUTPATIENT
Start: 2025-05-20 | End: 2025-08-18

## 2025-05-20 NOTE — TELEPHONE ENCOUNTER
Please review; protocol failed/No Protocol      Please advise on refill  Office visit note on 05/12/2025:  She experiences swelling in both feet and takes hydrochlorothiazide 12.5 mg every other day as needed, which helps reduce the swelling. However, she had a brief fainting spell after taking the medication, She continues to monitor her symptoms and adjust her medication use accordingly.

## 2025-05-20 NOTE — TELEPHONE ENCOUNTER
Office visit note on 05/12/2025 with Dr. Flowers:  She experiences swelling in both feet and takes hydrochlorothiazide 12.5 mg every other day as needed, which helps reduce the swelling. However, she had a brief fainting spell after taking the medication, She continues to monitor her symptoms and adjust her medication use accordingly.

## 2025-06-10 ENCOUNTER — NURSE TRIAGE (OUTPATIENT)
Dept: FAMILY MEDICINE CLINIC | Facility: CLINIC | Age: 89
End: 2025-06-10

## 2025-06-10 NOTE — TELEPHONE ENCOUNTER
Action Requested: Summary for Provider     []  Critical Lab, Recommendations Needed  [] Need Additional Advice  [x]   FYI    []   Need Orders  [] Need Medications Sent to Pharmacy  []  Other     SUMMARY: Pt scheduled for evaluation w/in 3-d per disposition, will continue home care until OV.    Reason for call: No chief complaint on file.  Onset: About 2.5 weeks    Notes:  - Wears clip-on earrings, about 2.5 weeks ago she wore one that was \"too tight\" and caused a scratch/tear to the back of her ear.  - Doing liquid bandaid, peroxide, vaseline, neosporin.    - Denies active drainage/bleeding. Has small amount of brown/crusty drainage to Q-tip when she cleans site at end of day but no active bleeding/drainage.  - Feels like cleaning/applying ointment takes away \"scab\" and restarts healing process.  Asking for recommendations on what can be done to help this heal.  - Taking Eliquis.  - Advised for OV evaluation, pt scheduled, she agreed to disposition. Pt added to Wait List, UC precautions discussed for worsening of symptoms.    Future Appointments   Date Time Provider Department Center   6/13/2025 12:40 PM Ann Flowers MD [facility]       Reason for Disposition   After 14 days and wound isn't healed    Protocols used: Cuts and [id number]

## 2025-06-13 ENCOUNTER — OFFICE VISIT (OUTPATIENT)
Dept: FAMILY MEDICINE CLINIC | Facility: CLINIC | Age: 89
End: 2025-06-13
Payer: MEDICARE

## 2025-06-13 VITALS
TEMPERATURE: 98 F | SYSTOLIC BLOOD PRESSURE: 124 MMHG | BODY MASS INDEX: 24.73 KG/M2 | WEIGHT: 131 LBS | OXYGEN SATURATION: 100 % | HEART RATE: 68 BPM | RESPIRATION RATE: 18 BRPM | HEIGHT: 61 IN | DIASTOLIC BLOOD PRESSURE: 82 MMHG

## 2025-06-13 DIAGNOSIS — H60.391 INFECTION OF RIGHT EAR LOBE: Primary | ICD-10-CM

## 2025-06-13 DIAGNOSIS — S01.311A: ICD-10-CM

## 2025-06-13 PROCEDURE — 1159F MED LIST DOCD IN RCRD: CPT | Performed by: FAMILY MEDICINE

## 2025-06-13 PROCEDURE — 99213 OFFICE O/P EST LOW 20 MIN: CPT | Performed by: FAMILY MEDICINE

## 2025-06-13 RX ORDER — CEPHALEXIN 500 MG/1
500 CAPSULE ORAL 2 TIMES DAILY
Qty: 20 CAPSULE | Refills: 0 | Status: SHIPPED | OUTPATIENT
Start: 2025-06-13

## 2025-06-13 NOTE — PROGRESS NOTES
Family Medicine Progress Note  ASSESSMENT AND PLAN:  James Ramírez is a 89 year old female who is here for:     James was seen today for ear problem and other.    Diagnoses and all orders for this visit:    Infection of right ear lobe  Non-healing wound due to improper earring removal. Persistent despite topical treatments. Potential infection considered.  - Prescribed oral antibiotics for one week.  - Advised continued use of Vaseline.  - Instructed to cover wound with gauze at night.  - Advised against peroxide use.  - Recommended cleaning with soap and water before Neosporin application.  - Provided non-stick gauze and paper tape.  - Instructed to report if no improvement in one week.  -     cephALEXin 500 MG Oral Cap; Take 1 capsule (500 mg total) by mouth 2 (two) times daily.    Laceration of ear lobe, right, initial encounter  - advised to keep wound clean and coven at night with non adherent gauze       The patient indicates understanding of these issues and agrees to the plan.  Follow-Up: The patient is asked to return in Return if symptoms worsen or fail to improve.  .     Ann Flowers MD        CC: Ear Problem (Tear on right ear from ear ring)     The following individual(s) verbally consented to be recorded using ambient AI listening technology and understand that they can each withdraw their consent to this listening technology at any point by asking the clinician to turn off or pause the recording:    Patient name: James Ramírez    HPI:     History of Present Illness  James Ramírez is an 89 year old female who presents with a non-healing wound on her right ear.    Approximately three weeks ago, she sustained a skin injury on her right ear from wearing tight clip-on earrings, resulting in a small tear in the skin. Since then, the wound has not healed properly, with daily foaming and irritation.    She has been applying Neosporin and Vaseline, but the wound continues to bleed when washed. Attempts  with liquid band-aid and peroxide have been ineffective. The wound crystallizes and gels overnight, and washing it off causes bleeding.    She experiences pain in the affected area and has been using various ointments.    ALLERGY:     MEDICATIONS:     Current Medications[1]   Past Medical History[2]   Social History:  Short Social Hx on File[3]     REVIEW OF SYSTEMS:   ROS as documented in HPI    EXAM:   /82   Pulse 68   Temp 98 °F (36.7 °C) (Temporal)   Resp 18   Ht 5' 1\" (1.549 m)   Wt 131 lb (59.4 kg)   SpO2 100%   BMI 24.75 kg/m²   GENERAL: well developed, well nourished,in no apparent distress  SKIN: right ear lobe erythema, swelling and tenderness, laceration of skin posteriorly  NECK: supple,no adenopathy  LUNGS: clear to auscultation  CARDIO:irregular rhythm, normal rate        NOTE TO PATIENT: The 21st Century Cures Act makes clinical notes like these available to patients in the interest of transparency. Clinical notes are medical documents used by physicians and care providers to communicate with each other. These documents include medical language and terminology, abbreviations, and treatment information that may sound technical and at times possibly unfamiliar. In addition, at times, the verbiage may appear blunt or direct. These documents are one tool providers use to communicate relevant information and clinical opinions of the care providers in a way that allows common understanding of the clinical context.      Ann Flowers MD           [1]   Current Outpatient Medications   Medication Sig Dispense Refill    cephALEXin 500 MG Oral Cap Take 1 capsule (500 mg total) by mouth 2 (two) times daily. 20 capsule 0    hydroCHLOROthiazide 12.5 MG Oral Tab Take 1 tablet (12.5 mg total) by mouth daily as needed. 90 tablet 0    alendronate 70 MG Oral Tab Take 1 tablet (70 mg total) by mouth every 7 days. 12 tablet 3    atorvastatin 40 MG Oral Tab Take 1 tablet (40 mg total) by mouth nightly. 90  tablet 1    Labetalol HCl 300 MG Oral Tab Take 1 tablet (300 mg total) by mouth 2 (two) times daily. 180 tablet 1    losartan 100 MG Oral Tab Take 1 tablet (100 mg total) by mouth daily. 90 tablet 1    apixaban (ELIQUIS) 5 MG Oral Tab Take 1 tablet (5 mg total) by mouth 2 (two) times daily. 180 tablet 1    mometasone 0.1 % External Ointment Apply 1 Application topically 2 (two) times daily as needed. 45 g 0    MOMETASONE FUROATE 0.1 % External Cream APPLY CREAM TOPICALLY TWICE DAILY AS NEEDED FOR IRRITATED SKIN LESION. 45 g 0    Hydroquinone 4 % External Cream Apply 1 Application topically daily. 30 g 3    METAMUCIL 28 % Oral Powd Pack Take 1 packet by mouth every morning.      Cholecalciferol (VITAMIN D3 OR) Take by mouth.     [2]   Past Medical History:   Acute, but ill-defined, cerebrovascular disease    Anxiety    Arrhythmia    DX AIFIB 2017    Arthritis    Atypical chest pain    Cancer (HCC)    Cataract    Cutaneous lupus erythematosus    + ABRIL 1:320 titer     Endometrial cancer (HCC)    Fibrocystic breast changes    Hypertension    Hyponatremia    Localized, secondary osteoarthritis of the lower leg    Mild intermittent asthma without complication (HCC)    Osteoarthritis    Osteoarthritis of hip    Pneumonia due to organism    Stroke (HCC)    no residual effects    Tibial fracture    Unspecified essential hypertension    office BP runs higher than home.   [3]   Social History  Socioeconomic History    Marital status:     Number of children: 0   Occupational History    Occupation: Retired      Comment: retired  from [employer]    Occupation: Taught nursery school   Tobacco Use    Smoking status: Former     Current packs/day: 0.00     Average packs/day: 0.6 packs/day for 45.0 years (25.0 ttl pk-yrs)     Types: Cigarettes     Start date: 1955     Quit date: 1995     Years since quittin.4     Passive exposure: Past    Smokeless tobacco: Never   Vaping Use    Vaping  status: Never Used   Substance and Sexual Activity    Alcohol use: Yes     Alcohol/week: 8.0 standard drinks of alcohol     Types: 2 Glasses of wine, 6 Shots of liquor per week     Comment: 4x weekly Rum     Drug use: Never    Sexual activity: Yes     Partners: Male   Other Topics Concern    Blood Transfusions Yes     Comment: 14 yr ago with hip replacement (own blood)    Caffeine Concern No    Occupational Exposure No    Hobby Hazards No    Sleep Concern No    Stress Concern Yes    Weight Concern No    Special Diet No    Exercise Yes    Seat Belt No   Social History Narrative    Lives with , recent move to [place]. No children. No pets. No Amish affiliation     Social Drivers of Health     Food Insecurity: No Food Insecurity (5/12/2025)    NCSS - Food Insecurity     Worried About Running Out of Food in the Last Year: No     Ran Out of Food in the Last Year: No   Transportation Needs: No Transportation Needs (5/12/2025)    NCSS - Transportation     Lack of Transportation: No   Housing Stability: Not At Risk (5/12/2025)    NCSS - Housing/Utilities     Has Housing: Yes     Worried About Losing Housing: No     Unable to Get Utilities: No

## 2025-06-13 NOTE — PATIENT INSTRUCTIONS
VISIT SUMMARY:    Today, you were seen for a non-healing wound on your right ear, which has been present for about three weeks. The wound was caused by wearing tight clip-on earrings and has not responded to your current treatments.    YOUR PLAN:    -NON-HEALING WOUND ON RIGHT EAR: A non-healing wound is a wound that does not go through the normal stages of healing. In your case, it was caused by tight clip-on earrings. We are concerned about a potential infection. You have been prescribed oral antibiotics for one week to address any possible infection. Continue using Vaseline on the wound and cover it with gauze at night. Avoid using peroxide as it can irritate the wound. Clean the wound with soap and water before applying Neosporin. You have been provided with non-stick gauze and paper tape to help with dressing the wound. Please report back if there is no improvement in one week.    INSTRUCTIONS:    Please follow up in one week if there is no improvement in your wound. Continue with the prescribed treatment and report any changes or concerns.

## 2025-06-23 PROBLEM — J44.89 ASTHMA WITH COPD (CHRONIC OBSTRUCTIVE PULMONARY DISEASE) (HCC): Chronic | Status: RESOLVED | Noted: 2018-06-20 | Resolved: 2025-06-23

## 2025-06-23 PROBLEM — J44.89 ASTHMA WITH COPD (CHRONIC OBSTRUCTIVE PULMONARY DISEASE) (HCC): Status: RESOLVED | Noted: 2018-06-20 | Resolved: 2025-06-23

## 2025-08-10 ENCOUNTER — APPOINTMENT (OUTPATIENT)
Dept: GENERAL RADIOLOGY | Facility: HOSPITAL | Age: 89
End: 2025-08-10
Attending: EMERGENCY MEDICINE

## 2025-08-10 ENCOUNTER — ANESTHESIA (OUTPATIENT)
Dept: SURGERY | Facility: HOSPITAL | Age: 89
End: 2025-08-10

## 2025-08-10 ENCOUNTER — APPOINTMENT (OUTPATIENT)
Dept: GENERAL RADIOLOGY | Facility: HOSPITAL | Age: 89
End: 2025-08-10
Attending: ORTHOPAEDIC SURGERY

## 2025-08-10 ENCOUNTER — ANESTHESIA EVENT (OUTPATIENT)
Dept: SURGERY | Facility: HOSPITAL | Age: 89
End: 2025-08-10

## 2025-08-10 ENCOUNTER — ANESTHESIA (OUTPATIENT)
Dept: MEDSURG UNIT | Facility: HOSPITAL | Age: 89
End: 2025-08-10

## 2025-08-10 ENCOUNTER — ANESTHESIA EVENT (OUTPATIENT)
Dept: MEDSURG UNIT | Facility: HOSPITAL | Age: 89
End: 2025-08-10

## 2025-08-10 ENCOUNTER — HOSPITAL ENCOUNTER (INPATIENT)
Facility: HOSPITAL | Age: 89
LOS: 6 days | Discharge: NURSING, REHAB OR LONG-TERM CARE | End: 2025-08-16
Attending: EMERGENCY MEDICINE | Admitting: STUDENT IN AN ORGANIZED HEALTH CARE EDUCATION/TRAINING PROGRAM

## 2025-08-10 DIAGNOSIS — S72.142A CLOSED INTERTROCHANTERIC FRACTURE OF HIP, LEFT, INITIAL ENCOUNTER (HCC): Primary | ICD-10-CM

## 2025-08-10 DIAGNOSIS — E87.1 HYPONATREMIA: ICD-10-CM

## 2025-08-10 LAB
ALBUMIN SERPL-MCNC: 4.1 G/DL (ref 3.2–4.8)
ALBUMIN/GLOB SERPL: 1.2 (ref 1–2)
ALP LIVER SERPL-CCNC: 93 U/L (ref 55–142)
ALT SERPL-CCNC: 13 U/L (ref 10–49)
ANION GAP SERPL CALC-SCNC: 11 MMOL/L (ref 0–18)
ANTIBODY SCREEN: NEGATIVE
APTT PPP: 34.6 SECONDS (ref 23–36)
AST SERPL-CCNC: 25 U/L (ref ?–34)
ATRIAL RATE: 57 BPM
BASOPHILS # BLD AUTO: 0.02 X10(3) UL (ref 0–0.2)
BASOPHILS NFR BLD AUTO: 0.2 %
BILIRUB SERPL-MCNC: 1.5 MG/DL (ref 0.2–1.1)
BILIRUB UR QL STRIP.AUTO: NEGATIVE
BUN BLD-MCNC: 11 MG/DL (ref 9–23)
CALCIUM BLD-MCNC: 9.3 MG/DL (ref 8.7–10.6)
CHLORIDE SERPL-SCNC: 97 MMOL/L (ref 98–112)
CLARITY UR REFRACT.AUTO: CLEAR
CO2 SERPL-SCNC: 23 MMOL/L (ref 21–32)
CREAT BLD-MCNC: 0.93 MG/DL (ref 0.55–1.02)
EGFRCR SERPLBLD CKD-EPI 2021: 59 ML/MIN/1.73M2 (ref 60–?)
EOSINOPHIL # BLD AUTO: 0.13 X10(3) UL (ref 0–0.7)
EOSINOPHIL NFR BLD AUTO: 1.3 %
ERYTHROCYTE [DISTWIDTH] IN BLOOD BY AUTOMATED COUNT: 12.6 %
GLOBULIN PLAS-MCNC: 3.3 G/DL (ref 2–3.5)
GLUCOSE BLD-MCNC: 137 MG/DL (ref 70–99)
GLUCOSE UR STRIP.AUTO-MCNC: NORMAL MG/DL
HCT VFR BLD AUTO: 30.9 % (ref 35–48)
HGB BLD-MCNC: 10.9 G/DL (ref 12–16)
IMM GRANULOCYTES # BLD AUTO: 0.06 X10(3) UL (ref 0–1)
IMM GRANULOCYTES NFR BLD: 0.6 %
INR BLD: 1.23 (ref 0.8–1.2)
KETONES UR STRIP.AUTO-MCNC: 10 MG/DL
LEUKOCYTE ESTERASE UR QL STRIP.AUTO: 25
LYMPHOCYTES # BLD AUTO: 0.67 X10(3) UL (ref 1–4)
LYMPHOCYTES NFR BLD AUTO: 7 %
MCH RBC QN AUTO: 32.9 PG (ref 26–34)
MCHC RBC AUTO-ENTMCNC: 35.3 G/DL (ref 31–37)
MCV RBC AUTO: 93.4 FL (ref 80–100)
MONOCYTES # BLD AUTO: 0.28 X10(3) UL (ref 0.1–1)
MONOCYTES NFR BLD AUTO: 2.9 %
MRSA NASAL: NEGATIVE
NEUTROPHILS # BLD AUTO: 8.48 X10 (3) UL (ref 1.5–7.7)
NEUTROPHILS # BLD AUTO: 8.48 X10(3) UL (ref 1.5–7.7)
NEUTROPHILS NFR BLD AUTO: 88 %
NITRITE UR QL STRIP.AUTO: NEGATIVE
OSMOLALITY SERPL CALC.SUM OF ELEC: 274 MOSM/KG (ref 275–295)
P AXIS: 82 DEGREES
P-R INTERVAL: 212 MS
PH UR STRIP.AUTO: 6.5 (ref 5–8)
PLATELET # BLD AUTO: 234 10(3)UL (ref 150–450)
POTASSIUM SERPL-SCNC: 4 MMOL/L (ref 3.5–5.1)
PROT SERPL-MCNC: 7.4 G/DL (ref 5.7–8.2)
PROTHROMBIN TIME: 15.6 SECONDS (ref 11.6–14.8)
Q-T INTERVAL: 458 MS
QRS DURATION: 94 MS
QTC CALCULATION (BEZET): 445 MS
R AXIS: 6 DEGREES
RBC # BLD AUTO: 3.31 X10(6)UL (ref 3.8–5.3)
RBC UR QL AUTO: NEGATIVE
RH BLOOD TYPE: POSITIVE
SODIUM SERPL-SCNC: 131 MMOL/L (ref 136–145)
SP GR UR STRIP.AUTO: 1.02 (ref 1–1.03)
STAPH A BY PCR: NEGATIVE
T AXIS: -29 DEGREES
TROPONIN I SERPL HS-MCNC: 10 NG/L (ref ?–34)
UROBILINOGEN UR STRIP.AUTO-MCNC: NORMAL MG/DL
VENTRICULAR RATE: 57 BPM
WBC # BLD AUTO: 9.6 X10(3) UL (ref 4–11)

## 2025-08-10 PROCEDURE — 99223 1ST HOSP IP/OBS HIGH 75: CPT | Performed by: STUDENT IN AN ORGANIZED HEALTH CARE EDUCATION/TRAINING PROGRAM

## 2025-08-10 PROCEDURE — 73502 X-RAY EXAM HIP UNI 2-3 VIEWS: CPT | Performed by: EMERGENCY MEDICINE

## 2025-08-10 PROCEDURE — 1159F MED LIST DOCD IN RCRD: CPT | Performed by: STUDENT IN AN ORGANIZED HEALTH CARE EDUCATION/TRAINING PROGRAM

## 2025-08-10 PROCEDURE — 76000 FLUOROSCOPY <1 HR PHYS/QHP: CPT | Performed by: ORTHOPAEDIC SURGERY

## 2025-08-10 PROCEDURE — 3E0T3BZ INTRODUCTION OF ANESTHETIC AGENT INTO PERIPHERAL NERVES AND PLEXI, PERCUTANEOUS APPROACH: ICD-10-PCS | Performed by: INTERNAL MEDICINE

## 2025-08-10 PROCEDURE — 71045 X-RAY EXAM CHEST 1 VIEW: CPT | Performed by: EMERGENCY MEDICINE

## 2025-08-10 PROCEDURE — 76942 ECHO GUIDE FOR BIOPSY: CPT | Performed by: INTERNAL MEDICINE

## 2025-08-10 PROCEDURE — 0QS706Z REPOSITION LEFT UPPER FEMUR WITH INTRAMEDULLARY INTERNAL FIXATION DEVICE, OPEN APPROACH: ICD-10-PCS | Performed by: ORTHOPAEDIC SURGERY

## 2025-08-10 DEVICE — IMPLANTABLE DEVICE: Type: IMPLANTABLE DEVICE | Site: HIP | Status: FUNCTIONAL

## 2025-08-10 RX ORDER — LIDOCAINE HYDROCHLORIDE 10 MG/ML
INJECTION, SOLUTION EPIDURAL; INFILTRATION; INTRACAUDAL; PERINEURAL AS NEEDED
Status: DISCONTINUED | OUTPATIENT
Start: 2025-08-10 | End: 2025-08-10 | Stop reason: SURG

## 2025-08-10 RX ORDER — ROCURONIUM BROMIDE 10 MG/ML
INJECTION, SOLUTION INTRAVENOUS AS NEEDED
Status: DISCONTINUED | OUTPATIENT
Start: 2025-08-10 | End: 2025-08-10 | Stop reason: SURG

## 2025-08-10 RX ORDER — DESONIDE 0.5 MG/G
OINTMENT TOPICAL 2 TIMES DAILY
COMMUNITY
Start: 2025-07-25 | End: 2025-08-15

## 2025-08-10 RX ORDER — BISACODYL 10 MG
10 SUPPOSITORY, RECTAL RECTAL
Status: DISCONTINUED | OUTPATIENT
Start: 2025-08-10 | End: 2025-08-16

## 2025-08-10 RX ORDER — ACETAMINOPHEN 500 MG
500 TABLET ORAL EVERY 4 HOURS PRN
Status: DISCONTINUED | OUTPATIENT
Start: 2025-08-10 | End: 2025-08-12

## 2025-08-10 RX ORDER — SODIUM PHOSPHATE, DIBASIC AND SODIUM PHOSPHATE, MONOBASIC 7; 19 G/230ML; G/230ML
1 ENEMA RECTAL ONCE AS NEEDED
Status: DISCONTINUED | OUTPATIENT
Start: 2025-08-10 | End: 2025-08-16

## 2025-08-10 RX ORDER — TRANEXAMIC ACID 10 MG/ML
INJECTION, SOLUTION INTRAVENOUS AS NEEDED
Status: DISCONTINUED | OUTPATIENT
Start: 2025-08-10 | End: 2025-08-10 | Stop reason: SURG

## 2025-08-10 RX ORDER — DEXAMETHASONE SODIUM PHOSPHATE 4 MG/ML
VIAL (ML) INJECTION AS NEEDED
Status: DISCONTINUED | OUTPATIENT
Start: 2025-08-10 | End: 2025-08-10 | Stop reason: SURG

## 2025-08-10 RX ORDER — BETAMETHASONE DIPROPIONATE 0.05 %
OINTMENT (GRAM) TOPICAL 2 TIMES DAILY
COMMUNITY
Start: 2025-08-07

## 2025-08-10 RX ORDER — LOSARTAN POTASSIUM 100 MG/1
100 TABLET ORAL DAILY
Status: DISCONTINUED | OUTPATIENT
Start: 2025-08-10 | End: 2025-08-16

## 2025-08-10 RX ORDER — ROPIVACAINE HYDROCHLORIDE 5 MG/ML
30 INJECTION, SOLUTION EPIDURAL; INFILTRATION; PERINEURAL AS NEEDED
Status: DISCONTINUED | OUTPATIENT
Start: 2025-08-10 | End: 2025-08-16

## 2025-08-10 RX ORDER — ECHINACEA PURPUREA EXTRACT 125 MG
1 TABLET ORAL
Status: DISCONTINUED | OUTPATIENT
Start: 2025-08-10 | End: 2025-08-16

## 2025-08-10 RX ORDER — POLYETHYLENE GLYCOL 3350 17 G/17G
17 POWDER, FOR SOLUTION ORAL DAILY PRN
Status: DISCONTINUED | OUTPATIENT
Start: 2025-08-10 | End: 2025-08-16

## 2025-08-10 RX ORDER — OXYCODONE HYDROCHLORIDE 5 MG/1
5 TABLET ORAL EVERY 4 HOURS PRN
Refills: 0 | Status: DISCONTINUED | OUTPATIENT
Start: 2025-08-10 | End: 2025-08-12

## 2025-08-10 RX ORDER — BENZONATATE 100 MG/1
200 CAPSULE ORAL 3 TIMES DAILY PRN
Status: DISCONTINUED | OUTPATIENT
Start: 2025-08-10 | End: 2025-08-16

## 2025-08-10 RX ORDER — OXYCODONE HYDROCHLORIDE 15 MG/1
15 TABLET ORAL EVERY 4 HOURS PRN
Refills: 0 | Status: DISCONTINUED | OUTPATIENT
Start: 2025-08-10 | End: 2025-08-12

## 2025-08-10 RX ORDER — DOCUSATE SODIUM 100 MG/1
100 CAPSULE, LIQUID FILLED ORAL 2 TIMES DAILY
Status: DISCONTINUED | OUTPATIENT
Start: 2025-08-10 | End: 2025-08-16

## 2025-08-10 RX ORDER — SODIUM CHLORIDE 9 MG/ML
10 INJECTION, SOLUTION INTRAMUSCULAR; INTRAVENOUS; SUBCUTANEOUS AS NEEDED
Status: DISCONTINUED | OUTPATIENT
Start: 2025-08-10 | End: 2025-08-16

## 2025-08-10 RX ORDER — SENNOSIDES 8.6 MG
17.2 TABLET ORAL NIGHTLY PRN
Status: DISCONTINUED | OUTPATIENT
Start: 2025-08-10 | End: 2025-08-16

## 2025-08-10 RX ORDER — OXYCODONE HYDROCHLORIDE 10 MG/1
10 TABLET ORAL EVERY 4 HOURS PRN
Refills: 0 | Status: DISCONTINUED | OUTPATIENT
Start: 2025-08-10 | End: 2025-08-12

## 2025-08-10 RX ORDER — DOXEPIN HYDROCHLORIDE 50 MG/1
1 CAPSULE ORAL DAILY
Status: DISCONTINUED | OUTPATIENT
Start: 2025-08-11 | End: 2025-08-16

## 2025-08-10 RX ORDER — CLOBETASOL PROPIONATE 0.5 MG/G
OINTMENT TOPICAL 2 TIMES DAILY
COMMUNITY
Start: 2025-07-25 | End: 2025-08-22

## 2025-08-10 RX ORDER — CEFAZOLIN SODIUM 1 G/3ML
INJECTION, POWDER, FOR SOLUTION INTRAMUSCULAR; INTRAVENOUS AS NEEDED
Status: DISCONTINUED | OUTPATIENT
Start: 2025-08-10 | End: 2025-08-10 | Stop reason: SURG

## 2025-08-10 RX ORDER — MORPHINE SULFATE 2 MG/ML
2 INJECTION, SOLUTION INTRAMUSCULAR; INTRAVENOUS ONCE
Status: COMPLETED | OUTPATIENT
Start: 2025-08-10 | End: 2025-08-10

## 2025-08-10 RX ORDER — ONDANSETRON 2 MG/ML
INJECTION INTRAMUSCULAR; INTRAVENOUS AS NEEDED
Status: DISCONTINUED | OUTPATIENT
Start: 2025-08-10 | End: 2025-08-10 | Stop reason: SURG

## 2025-08-10 RX ORDER — LABETALOL HYDROCHLORIDE 5 MG/ML
5 INJECTION, SOLUTION INTRAVENOUS EVERY 5 MIN PRN
Status: DISCONTINUED | OUTPATIENT
Start: 2025-08-10 | End: 2025-08-10 | Stop reason: HOSPADM

## 2025-08-10 RX ORDER — HYDROCODONE BITARTRATE AND ACETAMINOPHEN 5; 325 MG/1; MG/1
1 TABLET ORAL ONCE AS NEEDED
Status: DISCONTINUED | OUTPATIENT
Start: 2025-08-10 | End: 2025-08-10 | Stop reason: HOSPADM

## 2025-08-10 RX ORDER — NALOXONE HYDROCHLORIDE 0.4 MG/ML
0.08 INJECTION, SOLUTION INTRAMUSCULAR; INTRAVENOUS; SUBCUTANEOUS AS NEEDED
Status: DISCONTINUED | OUTPATIENT
Start: 2025-08-10 | End: 2025-08-10 | Stop reason: HOSPADM

## 2025-08-10 RX ORDER — SODIUM CHLORIDE, SODIUM LACTATE, POTASSIUM CHLORIDE, CALCIUM CHLORIDE 600; 310; 30; 20 MG/100ML; MG/100ML; MG/100ML; MG/100ML
INJECTION, SOLUTION INTRAVENOUS CONTINUOUS PRN
Status: DISCONTINUED | OUTPATIENT
Start: 2025-08-10 | End: 2025-08-10 | Stop reason: SURG

## 2025-08-10 RX ORDER — GUAIFENESIN 600 MG/1
600 TABLET, EXTENDED RELEASE ORAL 2 TIMES DAILY PRN
Status: DISCONTINUED | OUTPATIENT
Start: 2025-08-10 | End: 2025-08-16

## 2025-08-10 RX ORDER — HYDROMORPHONE HYDROCHLORIDE 1 MG/ML
0.6 INJECTION, SOLUTION INTRAMUSCULAR; INTRAVENOUS; SUBCUTANEOUS EVERY 5 MIN PRN
Status: DISCONTINUED | OUTPATIENT
Start: 2025-08-10 | End: 2025-08-10 | Stop reason: HOSPADM

## 2025-08-10 RX ORDER — HYDROCHLOROTHIAZIDE 12.5 MG/1
12.5 TABLET ORAL DAILY PRN
Status: DISCONTINUED | OUTPATIENT
Start: 2025-08-10 | End: 2025-08-16

## 2025-08-10 RX ORDER — SODIUM CHLORIDE 9 MG/ML
INJECTION, SOLUTION INTRAVENOUS CONTINUOUS
Status: DISCONTINUED | OUTPATIENT
Start: 2025-08-10 | End: 2025-08-16

## 2025-08-10 RX ORDER — PHENYLEPHRINE HCL 10 MG/ML
VIAL (ML) INJECTION AS NEEDED
Status: DISCONTINUED | OUTPATIENT
Start: 2025-08-10 | End: 2025-08-10 | Stop reason: SURG

## 2025-08-10 RX ORDER — LIDOCAINE HYDROCHLORIDE 10 MG/ML
2 INJECTION, SOLUTION EPIDURAL; INFILTRATION; INTRACAUDAL; PERINEURAL AS NEEDED
Status: DISCONTINUED | OUTPATIENT
Start: 2025-08-10 | End: 2025-08-16

## 2025-08-10 RX ORDER — ONDANSETRON 2 MG/ML
4 INJECTION INTRAMUSCULAR; INTRAVENOUS EVERY 6 HOURS PRN
Status: DISCONTINUED | OUTPATIENT
Start: 2025-08-10 | End: 2025-08-16

## 2025-08-10 RX ORDER — METOCLOPRAMIDE HYDROCHLORIDE 5 MG/ML
5 INJECTION INTRAMUSCULAR; INTRAVENOUS EVERY 8 HOURS PRN
Status: DISCONTINUED | OUTPATIENT
Start: 2025-08-10 | End: 2025-08-16

## 2025-08-10 RX ORDER — HYDROMORPHONE HYDROCHLORIDE 1 MG/ML
0.5 INJECTION, SOLUTION INTRAMUSCULAR; INTRAVENOUS; SUBCUTANEOUS EVERY 4 HOURS PRN
Refills: 0 | Status: DISCONTINUED | OUTPATIENT
Start: 2025-08-10 | End: 2025-08-13

## 2025-08-10 RX ORDER — SENNOSIDES 8.6 MG
17.2 TABLET ORAL NIGHTLY
Status: DISCONTINUED | OUTPATIENT
Start: 2025-08-10 | End: 2025-08-16

## 2025-08-10 RX ORDER — HYDROCODONE BITARTRATE AND ACETAMINOPHEN 5; 325 MG/1; MG/1
2 TABLET ORAL ONCE AS NEEDED
Status: DISCONTINUED | OUTPATIENT
Start: 2025-08-10 | End: 2025-08-10 | Stop reason: HOSPADM

## 2025-08-10 RX ORDER — HYDROMORPHONE HYDROCHLORIDE 1 MG/ML
0.4 INJECTION, SOLUTION INTRAMUSCULAR; INTRAVENOUS; SUBCUTANEOUS EVERY 5 MIN PRN
Status: DISCONTINUED | OUTPATIENT
Start: 2025-08-10 | End: 2025-08-10 | Stop reason: HOSPADM

## 2025-08-10 RX ORDER — HYDROMORPHONE HYDROCHLORIDE 1 MG/ML
0.2 INJECTION, SOLUTION INTRAMUSCULAR; INTRAVENOUS; SUBCUTANEOUS EVERY 5 MIN PRN
Status: DISCONTINUED | OUTPATIENT
Start: 2025-08-10 | End: 2025-08-10 | Stop reason: HOSPADM

## 2025-08-10 RX ORDER — ACETAMINOPHEN 500 MG
1000 TABLET ORAL ONCE AS NEEDED
Status: DISCONTINUED | OUTPATIENT
Start: 2025-08-10 | End: 2025-08-10 | Stop reason: HOSPADM

## 2025-08-10 RX ORDER — SODIUM CHLORIDE, SODIUM LACTATE, POTASSIUM CHLORIDE, CALCIUM CHLORIDE 600; 310; 30; 20 MG/100ML; MG/100ML; MG/100ML; MG/100ML
INJECTION, SOLUTION INTRAVENOUS CONTINUOUS
Status: DISCONTINUED | OUTPATIENT
Start: 2025-08-10 | End: 2025-08-10 | Stop reason: HOSPADM

## 2025-08-10 RX ORDER — ATORVASTATIN CALCIUM 40 MG/1
40 TABLET, FILM COATED ORAL NIGHTLY
Status: DISCONTINUED | OUTPATIENT
Start: 2025-08-10 | End: 2025-08-16

## 2025-08-10 RX ADMIN — CEFAZOLIN SODIUM 2 G: 1 INJECTION, POWDER, FOR SOLUTION INTRAMUSCULAR; INTRAVENOUS at 19:35:00

## 2025-08-10 RX ADMIN — PHENYLEPHRINE HCL 100 MCG: 10 MG/ML VIAL (ML) INJECTION at 19:47:00

## 2025-08-10 RX ADMIN — ROCURONIUM BROMIDE 50 MG: 10 INJECTION, SOLUTION INTRAVENOUS at 19:33:00

## 2025-08-10 RX ADMIN — LIDOCAINE HYDROCHLORIDE 50 MG: 10 INJECTION, SOLUTION EPIDURAL; INFILTRATION; INTRACAUDAL; PERINEURAL at 19:32:00

## 2025-08-10 RX ADMIN — ONDANSETRON 4 MG: 2 INJECTION INTRAMUSCULAR; INTRAVENOUS at 20:07:00

## 2025-08-10 RX ADMIN — PHENYLEPHRINE HCL 100 MCG: 10 MG/ML VIAL (ML) INJECTION at 20:15:00

## 2025-08-10 RX ADMIN — PHENYLEPHRINE HCL 100 MCG: 10 MG/ML VIAL (ML) INJECTION at 20:11:00

## 2025-08-10 RX ADMIN — PHENYLEPHRINE HCL 100 MCG: 10 MG/ML VIAL (ML) INJECTION at 20:07:00

## 2025-08-10 RX ADMIN — SODIUM CHLORIDE, SODIUM LACTATE, POTASSIUM CHLORIDE, CALCIUM CHLORIDE: 600; 310; 30; 20 INJECTION, SOLUTION INTRAVENOUS at 19:16:00

## 2025-08-10 RX ADMIN — DEXAMETHASONE SODIUM PHOSPHATE 4 MG: 4 MG/ML VIAL (ML) INJECTION at 20:07:00

## 2025-08-10 RX ADMIN — TRANEXAMIC ACID 1000 MG: 10 INJECTION, SOLUTION INTRAVENOUS at 19:52:00

## 2025-08-11 LAB
ALBUMIN SERPL-MCNC: 3.6 G/DL (ref 3.2–4.8)
ALBUMIN/GLOB SERPL: 1.2 (ref 1–2)
ALP LIVER SERPL-CCNC: 75 U/L (ref 55–142)
ALT SERPL-CCNC: 13 U/L (ref 10–49)
ANION GAP SERPL CALC-SCNC: 10 MMOL/L (ref 0–18)
AST SERPL-CCNC: 24 U/L (ref ?–34)
BASOPHILS # BLD AUTO: 0 X10(3) UL (ref 0–0.2)
BASOPHILS NFR BLD AUTO: 0 %
BILIRUB SERPL-MCNC: 1 MG/DL (ref 0.2–1.1)
BUN BLD-MCNC: 13 MG/DL (ref 9–23)
CALCIUM BLD-MCNC: 8.3 MG/DL (ref 8.7–10.6)
CHLORIDE SERPL-SCNC: 95 MMOL/L (ref 98–112)
CO2 SERPL-SCNC: 24 MMOL/L (ref 21–32)
CREAT BLD-MCNC: 0.91 MG/DL (ref 0.55–1.02)
EGFRCR SERPLBLD CKD-EPI 2021: 61 ML/MIN/1.73M2 (ref 60–?)
EOSINOPHIL # BLD AUTO: 0 X10(3) UL (ref 0–0.7)
EOSINOPHIL NFR BLD AUTO: 0 %
ERYTHROCYTE [DISTWIDTH] IN BLOOD BY AUTOMATED COUNT: 12.8 %
GLOBULIN PLAS-MCNC: 3 G/DL (ref 2–3.5)
GLUCOSE BLD-MCNC: 148 MG/DL (ref 70–99)
HCT VFR BLD AUTO: 24 % (ref 35–48)
HGB BLD-MCNC: 8.5 G/DL (ref 12–16)
IMM GRANULOCYTES # BLD AUTO: 0.02 X10(3) UL (ref 0–1)
IMM GRANULOCYTES NFR BLD: 0.3 %
LYMPHOCYTES # BLD AUTO: 0.46 X10(3) UL (ref 1–4)
LYMPHOCYTES NFR BLD AUTO: 7.2 %
MCH RBC QN AUTO: 33.7 PG (ref 26–34)
MCHC RBC AUTO-ENTMCNC: 35.4 G/DL (ref 31–37)
MCV RBC AUTO: 95.2 FL (ref 80–100)
MONOCYTES # BLD AUTO: 0.3 X10(3) UL (ref 0.1–1)
MONOCYTES NFR BLD AUTO: 4.7 %
NEUTROPHILS # BLD AUTO: 5.64 X10 (3) UL (ref 1.5–7.7)
NEUTROPHILS # BLD AUTO: 5.64 X10(3) UL (ref 1.5–7.7)
NEUTROPHILS NFR BLD AUTO: 87.8 %
OSMOLALITY SERPL CALC.SUM OF ELEC: 271 MOSM/KG (ref 275–295)
PLATELET # BLD AUTO: 201 10(3)UL (ref 150–450)
POTASSIUM SERPL-SCNC: 4.2 MMOL/L (ref 3.5–5.1)
PROT SERPL-MCNC: 6.6 G/DL (ref 5.7–8.2)
RBC # BLD AUTO: 2.52 X10(6)UL (ref 3.8–5.3)
SODIUM SERPL-SCNC: 129 MMOL/L (ref 136–145)
WBC # BLD AUTO: 6.4 X10(3) UL (ref 4–11)

## 2025-08-11 PROCEDURE — 99232 SBSQ HOSP IP/OBS MODERATE 35: CPT | Performed by: STUDENT IN AN ORGANIZED HEALTH CARE EDUCATION/TRAINING PROGRAM

## 2025-08-11 RX ORDER — BUPIVACAINE HYDROCHLORIDE 2.5 MG/ML
INJECTION, SOLUTION EPIDURAL; INFILTRATION; INTRACAUDAL; PERINEURAL AS NEEDED
Status: DISCONTINUED | OUTPATIENT
Start: 2025-08-10 | End: 2025-08-16

## 2025-08-12 ENCOUNTER — APPOINTMENT (OUTPATIENT)
Dept: CT IMAGING | Facility: HOSPITAL | Age: 89
End: 2025-08-12
Attending: HOSPITALIST

## 2025-08-12 LAB
ANION GAP SERPL CALC-SCNC: 11 MMOL/L (ref 0–18)
BASOPHILS # BLD AUTO: 0.03 X10(3) UL (ref 0–0.2)
BASOPHILS NFR BLD AUTO: 0.4 %
BUN BLD-MCNC: 14 MG/DL (ref 9–23)
CALCIUM BLD-MCNC: 8 MG/DL (ref 8.7–10.6)
CHLORIDE SERPL-SCNC: 100 MMOL/L (ref 98–112)
CO2 SERPL-SCNC: 21 MMOL/L (ref 21–32)
CREAT BLD-MCNC: 0.78 MG/DL (ref 0.55–1.02)
EGFRCR SERPLBLD CKD-EPI 2021: 73 ML/MIN/1.73M2 (ref 60–?)
EOSINOPHIL # BLD AUTO: 0.07 X10(3) UL (ref 0–0.7)
EOSINOPHIL NFR BLD AUTO: 0.9 %
ERYTHROCYTE [DISTWIDTH] IN BLOOD BY AUTOMATED COUNT: 12.9 %
GLUCOSE BLD-MCNC: 126 MG/DL (ref 70–99)
GLUCOSE BLD-MCNC: 130 MG/DL (ref 70–99)
HCT VFR BLD AUTO: 21 % (ref 35–48)
HGB BLD-MCNC: 7.2 G/DL (ref 12–16)
IMM GRANULOCYTES # BLD AUTO: 0.03 X10(3) UL (ref 0–1)
IMM GRANULOCYTES NFR BLD: 0.4 %
LYMPHOCYTES # BLD AUTO: 0.87 X10(3) UL (ref 1–4)
LYMPHOCYTES NFR BLD AUTO: 11.1 %
MCH RBC QN AUTO: 33.6 PG (ref 26–34)
MCHC RBC AUTO-ENTMCNC: 34.3 G/DL (ref 31–37)
MCV RBC AUTO: 98.1 FL (ref 80–100)
MONOCYTES # BLD AUTO: 0.81 X10(3) UL (ref 0.1–1)
MONOCYTES NFR BLD AUTO: 10.3 %
NEUTROPHILS # BLD AUTO: 6.05 X10 (3) UL (ref 1.5–7.7)
NEUTROPHILS # BLD AUTO: 6.05 X10(3) UL (ref 1.5–7.7)
NEUTROPHILS NFR BLD AUTO: 76.9 %
OSMOLALITY SERPL CALC.SUM OF ELEC: 276 MOSM/KG (ref 275–295)
PLATELET # BLD AUTO: 174 10(3)UL (ref 150–450)
POTASSIUM SERPL-SCNC: 4 MMOL/L (ref 3.5–5.1)
RBC # BLD AUTO: 2.14 X10(6)UL (ref 3.8–5.3)
SODIUM SERPL-SCNC: 132 MMOL/L (ref 136–145)
WBC # BLD AUTO: 7.9 X10(3) UL (ref 4–11)

## 2025-08-12 PROCEDURE — 70450 CT HEAD/BRAIN W/O DYE: CPT | Performed by: HOSPITALIST

## 2025-08-12 PROCEDURE — 99232 SBSQ HOSP IP/OBS MODERATE 35: CPT | Performed by: HOSPITALIST

## 2025-08-12 RX ORDER — ACETAMINOPHEN 500 MG
1000 TABLET ORAL EVERY 4 HOURS PRN
Status: DISCONTINUED | OUTPATIENT
Start: 2025-08-12 | End: 2025-08-16

## 2025-08-12 RX ORDER — NALOXONE HYDROCHLORIDE 0.4 MG/ML
0.4 INJECTION, SOLUTION INTRAMUSCULAR; INTRAVENOUS; SUBCUTANEOUS ONCE
Status: COMPLETED | OUTPATIENT
Start: 2025-08-12 | End: 2025-08-12

## 2025-08-12 RX ORDER — NALOXONE HYDROCHLORIDE 0.4 MG/ML
INJECTION, SOLUTION INTRAMUSCULAR; INTRAVENOUS; SUBCUTANEOUS
Status: COMPLETED
Start: 2025-08-12 | End: 2025-08-12

## 2025-08-13 LAB
ARTERIAL PATENCY WRIST A: POSITIVE
BASE EXCESS BLDA CALC-SCNC: 0.5 MMOL/L (ref ?–2)
BODY TEMPERATURE: 98.6 F
CA-I BLD-SCNC: 1.16 MMOL/L (ref 0.95–1.32)
COHGB MFR BLD: 1.8 % SAT (ref 0–3)
HCO3 BLDA-SCNC: 25.3 MEQ/L (ref 21–27)
HGB BLD-MCNC: 8.7 G/DL (ref 12–16)
LACTATE BLD-SCNC: 0.7 MMOL/L (ref 0.5–2)
METHGB MFR BLD: 1.1 % SAT (ref 0.4–1.5)
OXYHGB MFR BLDA: 94.5 % (ref 92–100)
PCO2 BLDA: 34 MM HG (ref 35–45)
PH BLDA: 7.46 (ref 7.35–7.45)
PO2 BLDA: 68 MM HG (ref 80–100)
POTASSIUM BLD-SCNC: 4 MMOL/L (ref 3.6–5.1)
SODIUM BLD-SCNC: 127 MMOL/L (ref 135–145)

## 2025-08-13 PROCEDURE — 99232 SBSQ HOSP IP/OBS MODERATE 35: CPT | Performed by: STUDENT IN AN ORGANIZED HEALTH CARE EDUCATION/TRAINING PROGRAM

## 2025-08-14 PROCEDURE — 99232 SBSQ HOSP IP/OBS MODERATE 35: CPT | Performed by: STUDENT IN AN ORGANIZED HEALTH CARE EDUCATION/TRAINING PROGRAM

## 2025-08-14 RX ORDER — HYDRALAZINE HYDROCHLORIDE 25 MG/1
25 TABLET, FILM COATED ORAL EVERY 6 HOURS PRN
Status: DISCONTINUED | OUTPATIENT
Start: 2025-08-14 | End: 2025-08-16

## 2025-08-14 RX ORDER — ACETAMINOPHEN 500 MG
1000 TABLET ORAL EVERY 8 HOURS PRN
Qty: 20 TABLET | Refills: 0 | Status: SHIPPED | OUTPATIENT
Start: 2025-08-14

## 2025-08-15 ENCOUNTER — APPOINTMENT (OUTPATIENT)
Dept: GENERAL RADIOLOGY | Facility: HOSPITAL | Age: 89
End: 2025-08-15
Attending: STUDENT IN AN ORGANIZED HEALTH CARE EDUCATION/TRAINING PROGRAM

## 2025-08-15 ENCOUNTER — APPOINTMENT (OUTPATIENT)
Dept: CT IMAGING | Facility: HOSPITAL | Age: 89
End: 2025-08-15
Attending: STUDENT IN AN ORGANIZED HEALTH CARE EDUCATION/TRAINING PROGRAM

## 2025-08-15 LAB
CREAT BLD-MCNC: 0.69 MG/DL (ref 0.55–1.02)
D DIMER PPP FEU-MCNC: 3.05 UG/ML FEU (ref ?–0.88)
EGFRCR SERPLBLD CKD-EPI 2021: 83 ML/MIN/1.73M2 (ref 60–?)

## 2025-08-15 PROCEDURE — 99232 SBSQ HOSP IP/OBS MODERATE 35: CPT | Performed by: STUDENT IN AN ORGANIZED HEALTH CARE EDUCATION/TRAINING PROGRAM

## 2025-08-15 PROCEDURE — 71275 CT ANGIOGRAPHY CHEST: CPT | Performed by: STUDENT IN AN ORGANIZED HEALTH CARE EDUCATION/TRAINING PROGRAM

## 2025-08-15 PROCEDURE — 71045 X-RAY EXAM CHEST 1 VIEW: CPT | Performed by: STUDENT IN AN ORGANIZED HEALTH CARE EDUCATION/TRAINING PROGRAM

## 2025-08-15 RX ORDER — HYDRALAZINE HYDROCHLORIDE 20 MG/ML
5 INJECTION INTRAMUSCULAR; INTRAVENOUS EVERY 4 HOURS PRN
Status: DISCONTINUED | OUTPATIENT
Start: 2025-08-15 | End: 2025-08-16

## 2025-08-15 RX ORDER — LABETALOL HYDROCHLORIDE 5 MG/ML
10 INJECTION, SOLUTION INTRAVENOUS EVERY 4 HOURS PRN
Status: DISCONTINUED | OUTPATIENT
Start: 2025-08-15 | End: 2025-08-16

## 2025-08-16 VITALS
HEART RATE: 70 BPM | TEMPERATURE: 98 F | DIASTOLIC BLOOD PRESSURE: 46 MMHG | OXYGEN SATURATION: 100 % | HEIGHT: 61 IN | SYSTOLIC BLOOD PRESSURE: 137 MMHG | RESPIRATION RATE: 22 BRPM | BODY MASS INDEX: 24.92 KG/M2 | WEIGHT: 132 LBS

## 2025-08-16 PROCEDURE — 99239 HOSP IP/OBS DSCHRG MGMT >30: CPT | Performed by: STUDENT IN AN ORGANIZED HEALTH CARE EDUCATION/TRAINING PROGRAM

## 2025-08-16 RX ORDER — IPRATROPIUM BROMIDE AND ALBUTEROL SULFATE 2.5; .5 MG/3ML; MG/3ML
3 SOLUTION RESPIRATORY (INHALATION)
Status: DISCONTINUED | OUTPATIENT
Start: 2025-08-16 | End: 2025-08-16

## 2025-08-18 ENCOUNTER — APPOINTMENT (OUTPATIENT)
Dept: ULTRASOUND IMAGING | Facility: HOSPITAL | Age: 89
End: 2025-08-18
Attending: EMERGENCY MEDICINE

## 2025-08-18 ENCOUNTER — INITIAL APN SNF VISIT (OUTPATIENT)
Dept: INTERNAL MEDICINE CLINIC | Age: 89
End: 2025-08-18

## 2025-08-18 ENCOUNTER — HOSPITAL ENCOUNTER (EMERGENCY)
Facility: HOSPITAL | Age: 89
Discharge: HOME | End: 2025-08-18
Attending: EMERGENCY MEDICINE

## 2025-08-18 VITALS
SYSTOLIC BLOOD PRESSURE: 155 MMHG | DIASTOLIC BLOOD PRESSURE: 84 MMHG | OXYGEN SATURATION: 98 % | BODY MASS INDEX: 24 KG/M2 | HEART RATE: 92 BPM | TEMPERATURE: 98 F | WEIGHT: 125 LBS | RESPIRATION RATE: 15 BRPM

## 2025-08-18 VITALS
RESPIRATION RATE: 20 BRPM | DIASTOLIC BLOOD PRESSURE: 78 MMHG | TEMPERATURE: 98 F | WEIGHT: 126 LBS | SYSTOLIC BLOOD PRESSURE: 148 MMHG | HEIGHT: 61 IN | HEART RATE: 88 BPM | BODY MASS INDEX: 23.79 KG/M2 | OXYGEN SATURATION: 100 %

## 2025-08-18 DIAGNOSIS — D62 ACUTE BLOOD LOSS AS CAUSE OF POSTOPERATIVE ANEMIA: ICD-10-CM

## 2025-08-18 DIAGNOSIS — R55 SYNCOPE, UNSPECIFIED SYNCOPE TYPE: ICD-10-CM

## 2025-08-18 DIAGNOSIS — R55 SYNCOPE AND COLLAPSE: ICD-10-CM

## 2025-08-18 DIAGNOSIS — K80.20 CALCULUS OF GALLBLADDER WITHOUT CHOLECYSTITIS WITHOUT OBSTRUCTION: ICD-10-CM

## 2025-08-18 DIAGNOSIS — S72.002S CLOSED FRACTURE OF LEFT HIP, SEQUELA: ICD-10-CM

## 2025-08-18 DIAGNOSIS — E87.1 HYPONATREMIA: ICD-10-CM

## 2025-08-18 DIAGNOSIS — E86.0 DEHYDRATION: ICD-10-CM

## 2025-08-18 DIAGNOSIS — E78.5 HYPERLIPIDEMIA, UNSPECIFIED HYPERLIPIDEMIA TYPE: ICD-10-CM

## 2025-08-18 DIAGNOSIS — R94.5 LIVER FUNCTION ABNORMALITY: ICD-10-CM

## 2025-08-18 DIAGNOSIS — D51.8 MACROCYTIC ANEMIA WITH VITAMIN B12 DEFICIENCY: ICD-10-CM

## 2025-08-18 DIAGNOSIS — N18.31 CHRONIC KIDNEY DISEASE, STAGE 3A (HCC): ICD-10-CM

## 2025-08-18 DIAGNOSIS — I27.20 PULMONARY HYPERTENSION, MODERATE TO SEVERE (HCC): ICD-10-CM

## 2025-08-18 DIAGNOSIS — D64.9 SYMPTOMATIC ANEMIA: Primary | ICD-10-CM

## 2025-08-18 DIAGNOSIS — D62 ACUTE POSTOPERATIVE ANEMIA DUE TO EXPECTED BLOOD LOSS: ICD-10-CM

## 2025-08-18 DIAGNOSIS — E87.1 CHRONIC HYPONATREMIA: ICD-10-CM

## 2025-08-18 DIAGNOSIS — D64.9 SYMPTOMATIC ANEMIA: ICD-10-CM

## 2025-08-18 DIAGNOSIS — I48.0 PAROXYSMAL ATRIAL FIBRILLATION (HCC): Primary | ICD-10-CM

## 2025-08-18 LAB
ALBUMIN SERPL-MCNC: 3.3 G/DL (ref 3.2–4.8)
ALBUMIN/GLOB SERPL: 1 (ref 1–2)
ALP LIVER SERPL-CCNC: 62 U/L (ref 55–142)
ALT SERPL-CCNC: 53 U/L (ref 10–49)
ANION GAP SERPL CALC-SCNC: 10 MMOL/L (ref 0–18)
ANTIBODY SCREEN: NEGATIVE
AST SERPL-CCNC: 79 U/L (ref ?–34)
BASOPHILS # BLD AUTO: 0.03 X10(3) UL (ref 0–0.2)
BASOPHILS NFR BLD AUTO: 0.4 %
BILIRUB SERPL-MCNC: 1.8 MG/DL (ref 0.2–1.1)
BILIRUB UR QL STRIP.AUTO: NEGATIVE
BUN BLD-MCNC: 14 MG/DL (ref 9–23)
CALCIUM BLD-MCNC: 8.8 MG/DL (ref 8.7–10.6)
CHLORIDE SERPL-SCNC: 99 MMOL/L (ref 98–112)
CLARITY UR REFRACT.AUTO: CLEAR
CO2 SERPL-SCNC: 22 MMOL/L (ref 21–32)
COLOR UR AUTO: COLORLESS
CREAT BLD-MCNC: 0.74 MG/DL (ref 0.55–1.02)
EGFRCR SERPLBLD CKD-EPI 2021: 78 ML/MIN/1.73M2 (ref 60–?)
EOSINOPHIL # BLD AUTO: 0.17 X10(3) UL (ref 0–0.7)
EOSINOPHIL NFR BLD AUTO: 2.4 %
ERYTHROCYTE [DISTWIDTH] IN BLOOD BY AUTOMATED COUNT: 12.9 %
GLOBULIN PLAS-MCNC: 3.2 G/DL (ref 2–3.5)
GLUCOSE BLD-MCNC: 118 MG/DL (ref 70–99)
GLUCOSE BLD-MCNC: 132 MG/DL (ref 70–99)
GLUCOSE UR STRIP.AUTO-MCNC: NORMAL MG/DL
HCT VFR BLD AUTO: 21.8 % (ref 35–48)
HGB BLD-MCNC: 7.8 G/DL (ref 12–16)
IMM GRANULOCYTES # BLD AUTO: 0.05 X10(3) UL (ref 0–1)
IMM GRANULOCYTES NFR BLD: 0.7 %
KETONES UR STRIP.AUTO-MCNC: NEGATIVE MG/DL
LEUKOCYTE ESTERASE UR QL STRIP.AUTO: NEGATIVE
LIPASE SERPL-CCNC: 27 U/L (ref 12–53)
LYMPHOCYTES # BLD AUTO: 0.62 X10(3) UL (ref 1–4)
LYMPHOCYTES NFR BLD AUTO: 8.9 %
MCH RBC QN AUTO: 33.5 PG (ref 26–34)
MCHC RBC AUTO-ENTMCNC: 35.8 G/DL (ref 31–37)
MCV RBC AUTO: 93.6 FL (ref 80–100)
MONOCYTES # BLD AUTO: 0.48 X10(3) UL (ref 0.1–1)
MONOCYTES NFR BLD AUTO: 6.9 %
NEUTROPHILS # BLD AUTO: 5.63 X10 (3) UL (ref 1.5–7.7)
NEUTROPHILS # BLD AUTO: 5.63 X10(3) UL (ref 1.5–7.7)
NEUTROPHILS NFR BLD AUTO: 80.7 %
NITRITE UR QL STRIP.AUTO: NEGATIVE
OSMOLALITY SERPL CALC.SUM OF ELEC: 274 MOSM/KG (ref 275–295)
PH UR STRIP.AUTO: 6.5 (ref 5–8)
PLATELET # BLD AUTO: 334 10(3)UL (ref 150–450)
POTASSIUM SERPL-SCNC: 3.4 MMOL/L (ref 3.5–5.1)
PROT SERPL-MCNC: 6.5 G/DL (ref 5.7–8.2)
PROT UR STRIP.AUTO-MCNC: NEGATIVE MG/DL
Q-T INTERVAL: 370 MS
QRS DURATION: 94 MS
QTC CALCULATION (BEZET): 472 MS
R AXIS: 22 DEGREES
RBC # BLD AUTO: 2.33 X10(6)UL (ref 3.8–5.3)
RBC UR QL AUTO: NEGATIVE
RH BLOOD TYPE: POSITIVE
SODIUM SERPL-SCNC: 131 MMOL/L (ref 136–145)
SP GR UR STRIP.AUTO: 1.01 (ref 1–1.03)
T AXIS: -1 DEGREES
UROBILINOGEN UR STRIP.AUTO-MCNC: NORMAL MG/DL
VENTRICULAR RATE: 98 BPM
WBC # BLD AUTO: 7 X10(3) UL (ref 4–11)

## 2025-08-18 PROCEDURE — 86901 BLOOD TYPING SEROLOGIC RH(D): CPT | Performed by: EMERGENCY MEDICINE

## 2025-08-18 PROCEDURE — 1123F ACP DISCUSS/DSCN MKR DOCD: CPT | Performed by: NURSE PRACTITIONER

## 2025-08-18 PROCEDURE — 83690 ASSAY OF LIPASE: CPT | Performed by: EMERGENCY MEDICINE

## 2025-08-18 PROCEDURE — 76700 US EXAM ABDOM COMPLETE: CPT | Performed by: EMERGENCY MEDICINE

## 2025-08-18 PROCEDURE — 1111F DSCHRG MED/CURRENT MED MERGE: CPT | Performed by: NURSE PRACTITIONER

## 2025-08-18 PROCEDURE — 82962 GLUCOSE BLOOD TEST: CPT

## 2025-08-18 PROCEDURE — 99306 1ST NF CARE HIGH MDM 50: CPT | Performed by: NURSE PRACTITIONER

## 2025-08-18 PROCEDURE — 36430 TRANSFUSION BLD/BLD COMPNT: CPT

## 2025-08-18 PROCEDURE — 93005 ELECTROCARDIOGRAM TRACING: CPT

## 2025-08-18 PROCEDURE — 99285 EMERGENCY DEPT VISIT HI MDM: CPT

## 2025-08-18 PROCEDURE — 85025 COMPLETE CBC W/AUTO DIFF WBC: CPT | Performed by: EMERGENCY MEDICINE

## 2025-08-18 PROCEDURE — 1160F RVW MEDS BY RX/DR IN RCRD: CPT | Performed by: NURSE PRACTITIONER

## 2025-08-18 PROCEDURE — 86920 COMPATIBILITY TEST SPIN: CPT

## 2025-08-18 PROCEDURE — 1159F MED LIST DOCD IN RCRD: CPT | Performed by: NURSE PRACTITIONER

## 2025-08-18 PROCEDURE — 81003 URINALYSIS AUTO W/O SCOPE: CPT | Performed by: EMERGENCY MEDICINE

## 2025-08-18 PROCEDURE — 80053 COMPREHEN METABOLIC PANEL: CPT | Performed by: EMERGENCY MEDICINE

## 2025-08-18 PROCEDURE — 86900 BLOOD TYPING SEROLOGIC ABO: CPT | Performed by: EMERGENCY MEDICINE

## 2025-08-18 PROCEDURE — 86850 RBC ANTIBODY SCREEN: CPT | Performed by: EMERGENCY MEDICINE

## 2025-08-18 PROCEDURE — 93010 ELECTROCARDIOGRAM REPORT: CPT

## 2025-08-18 PROCEDURE — 96361 HYDRATE IV INFUSION ADD-ON: CPT

## 2025-08-18 PROCEDURE — 96360 HYDRATION IV INFUSION INIT: CPT

## 2025-08-18 RX ORDER — SODIUM CHLORIDE 9 MG/ML
INJECTION, SOLUTION INTRAVENOUS CONTINUOUS
Status: DISCONTINUED | OUTPATIENT
Start: 2025-08-18 | End: 2025-08-18

## 2025-08-19 ENCOUNTER — EXTERNAL FACILITY (OUTPATIENT)
Dept: FAMILY MEDICINE CLINIC | Facility: CLINIC | Age: 89
End: 2025-08-19

## 2025-08-19 ENCOUNTER — TELEPHONE (OUTPATIENT)
Dept: FAMILY MEDICINE CLINIC | Facility: CLINIC | Age: 89
End: 2025-08-19

## 2025-08-19 DIAGNOSIS — E87.1 HYPONATREMIA: ICD-10-CM

## 2025-08-19 DIAGNOSIS — Z96.641 STATUS POST TOTAL REPLACEMENT OF RIGHT HIP: ICD-10-CM

## 2025-08-19 DIAGNOSIS — R53.1 GENERALIZED WEAKNESS: ICD-10-CM

## 2025-08-19 DIAGNOSIS — I70.0 THORACIC AORTA ATHEROSCLEROSIS: ICD-10-CM

## 2025-08-19 DIAGNOSIS — M17.12 LOCALIZED OSTEOARTHRITIS OF LEFT KNEE: ICD-10-CM

## 2025-08-19 DIAGNOSIS — I48.0 PAROXYSMAL ATRIAL FIBRILLATION (HCC): ICD-10-CM

## 2025-08-19 DIAGNOSIS — I10 ESSENTIAL HYPERTENSION: ICD-10-CM

## 2025-08-19 DIAGNOSIS — I65.21 STENOSIS OF RIGHT CAROTID ARTERY: ICD-10-CM

## 2025-08-19 DIAGNOSIS — D51.8 MACROCYTIC ANEMIA WITH VITAMIN B12 DEFICIENCY: ICD-10-CM

## 2025-08-19 DIAGNOSIS — Z79.01 CHRONIC ANTICOAGULATION: ICD-10-CM

## 2025-08-19 DIAGNOSIS — R53.81 PHYSICAL DECONDITIONING: ICD-10-CM

## 2025-08-19 DIAGNOSIS — Z91.89 AT HIGH RISK FOR MALNUTRITION: ICD-10-CM

## 2025-08-19 DIAGNOSIS — K59.00 CONSTIPATION, UNSPECIFIED CONSTIPATION TYPE: ICD-10-CM

## 2025-08-19 DIAGNOSIS — Z87.81 S/P LEFT HIP FRACTURE: Primary | ICD-10-CM

## 2025-08-19 DIAGNOSIS — L93.0 DISCOID LUPUS: ICD-10-CM

## 2025-08-19 DIAGNOSIS — N18.31 CHRONIC KIDNEY DISEASE, STAGE 3A (HCC): ICD-10-CM

## 2025-08-19 DIAGNOSIS — M25.50 POLYARTHRALGIA: ICD-10-CM

## 2025-08-19 DIAGNOSIS — M81.0 AGE-RELATED OSTEOPOROSIS WITHOUT CURRENT PATHOLOGICAL FRACTURE: ICD-10-CM

## 2025-08-19 DIAGNOSIS — R26.81 GAIT INSTABILITY: ICD-10-CM

## 2025-08-19 DIAGNOSIS — Z86.73 H/O ISCHEMIC RIGHT PCA STROKE: ICD-10-CM

## 2025-08-19 DIAGNOSIS — I27.20 PULMONARY HYPERTENSION, MODERATE TO SEVERE (HCC): ICD-10-CM

## 2025-08-19 DIAGNOSIS — L82.0 SEBORRHEIC KERATOSES, INFLAMED: ICD-10-CM

## 2025-08-19 DIAGNOSIS — E78.5 HYPERLIPIDEMIA, UNSPECIFIED HYPERLIPIDEMIA TYPE: ICD-10-CM

## 2025-08-19 LAB
BLOOD TYPE BARCODE: 6200
UNIT VOLUME: 350 ML

## 2025-08-21 ENCOUNTER — SNF VISIT (OUTPATIENT)
Dept: INTERNAL MEDICINE CLINIC | Age: 89
End: 2025-08-21

## 2025-08-21 VITALS
OXYGEN SATURATION: 98 % | BODY MASS INDEX: 25 KG/M2 | SYSTOLIC BLOOD PRESSURE: 176 MMHG | TEMPERATURE: 98 F | WEIGHT: 130.81 LBS | RESPIRATION RATE: 16 BRPM | DIASTOLIC BLOOD PRESSURE: 83 MMHG | HEART RATE: 72 BPM

## 2025-08-21 DIAGNOSIS — R55 SYNCOPE, UNSPECIFIED SYNCOPE TYPE: Primary | ICD-10-CM

## 2025-08-21 DIAGNOSIS — D62 ACUTE BLOOD LOSS AS CAUSE OF POSTOPERATIVE ANEMIA: ICD-10-CM

## 2025-08-21 DIAGNOSIS — D64.9 SYMPTOMATIC ANEMIA: ICD-10-CM

## 2025-08-21 DIAGNOSIS — E87.1 CHRONIC HYPONATREMIA: ICD-10-CM

## 2025-08-21 DIAGNOSIS — E78.5 HYPERLIPIDEMIA, UNSPECIFIED HYPERLIPIDEMIA TYPE: ICD-10-CM

## 2025-08-21 DIAGNOSIS — I27.20 PULMONARY HYPERTENSION, MODERATE TO SEVERE (HCC): ICD-10-CM

## 2025-08-21 DIAGNOSIS — N18.31 CHRONIC KIDNEY DISEASE, STAGE 3A (HCC): ICD-10-CM

## 2025-08-21 DIAGNOSIS — D51.8 MACROCYTIC ANEMIA WITH VITAMIN B12 DEFICIENCY: ICD-10-CM

## 2025-08-21 DIAGNOSIS — S72.002S CLOSED FRACTURE OF LEFT HIP, SEQUELA: ICD-10-CM

## 2025-08-21 DIAGNOSIS — I48.0 PAROXYSMAL ATRIAL FIBRILLATION (HCC): ICD-10-CM

## 2025-08-21 PROCEDURE — 1159F MED LIST DOCD IN RCRD: CPT | Performed by: NURSE PRACTITIONER

## 2025-08-21 PROCEDURE — 1111F DSCHRG MED/CURRENT MED MERGE: CPT | Performed by: NURSE PRACTITIONER

## 2025-08-21 PROCEDURE — 1160F RVW MEDS BY RX/DR IN RCRD: CPT | Performed by: NURSE PRACTITIONER

## 2025-08-21 PROCEDURE — 99309 SBSQ NF CARE MODERATE MDM 30: CPT | Performed by: NURSE PRACTITIONER

## 2025-08-21 RX ORDER — HYDRALAZINE HYDROCHLORIDE 10 MG/1
10 TABLET, FILM COATED ORAL 3 TIMES DAILY
COMMUNITY
Start: 2025-08-21 | End: 2025-08-25

## 2025-08-24 PROBLEM — R53.82 CHRONIC FATIGUE: Status: RESOLVED | Noted: 2025-02-10 | Resolved: 2025-08-24

## 2025-08-25 ENCOUNTER — SNF VISIT (OUTPATIENT)
Dept: INTERNAL MEDICINE CLINIC | Age: 89
End: 2025-08-25

## 2025-08-25 VITALS
DIASTOLIC BLOOD PRESSURE: 66 MMHG | WEIGHT: 130.81 LBS | TEMPERATURE: 98 F | RESPIRATION RATE: 17 BRPM | BODY MASS INDEX: 25 KG/M2 | SYSTOLIC BLOOD PRESSURE: 152 MMHG | HEART RATE: 79 BPM | OXYGEN SATURATION: 98 %

## 2025-08-25 DIAGNOSIS — N18.31 CHRONIC KIDNEY DISEASE, STAGE 3A (HCC): ICD-10-CM

## 2025-08-25 DIAGNOSIS — E78.5 HYPERLIPIDEMIA, UNSPECIFIED HYPERLIPIDEMIA TYPE: ICD-10-CM

## 2025-08-25 DIAGNOSIS — E87.1 CHRONIC HYPONATREMIA: ICD-10-CM

## 2025-08-25 DIAGNOSIS — D62 ACUTE BLOOD LOSS AS CAUSE OF POSTOPERATIVE ANEMIA: ICD-10-CM

## 2025-08-25 DIAGNOSIS — S72.002S CLOSED FRACTURE OF LEFT HIP, SEQUELA: ICD-10-CM

## 2025-08-25 DIAGNOSIS — I48.0 PAROXYSMAL ATRIAL FIBRILLATION (HCC): ICD-10-CM

## 2025-08-25 DIAGNOSIS — I27.20 PULMONARY HYPERTENSION, MODERATE TO SEVERE (HCC): ICD-10-CM

## 2025-08-25 DIAGNOSIS — D64.9 SYMPTOMATIC ANEMIA: Primary | ICD-10-CM

## 2025-08-25 RX ORDER — HYDRALAZINE HYDROCHLORIDE 25 MG/1
25 TABLET, FILM COATED ORAL 2 TIMES DAILY
COMMUNITY
Start: 2025-08-25

## 2025-08-28 ENCOUNTER — SNF VISIT (OUTPATIENT)
Dept: INTERNAL MEDICINE CLINIC | Age: 89
End: 2025-08-28

## 2025-08-28 VITALS
RESPIRATION RATE: 18 BRPM | SYSTOLIC BLOOD PRESSURE: 157 MMHG | TEMPERATURE: 98 F | OXYGEN SATURATION: 96 % | HEART RATE: 68 BPM | BODY MASS INDEX: 24 KG/M2 | DIASTOLIC BLOOD PRESSURE: 57 MMHG | WEIGHT: 124.81 LBS

## 2025-08-28 DIAGNOSIS — N18.31 CHRONIC KIDNEY DISEASE, STAGE 3A (HCC): ICD-10-CM

## 2025-08-28 DIAGNOSIS — I27.20 PULMONARY HYPERTENSION, MODERATE TO SEVERE (HCC): Primary | ICD-10-CM

## 2025-08-28 DIAGNOSIS — S72.002S CLOSED FRACTURE OF LEFT HIP, SEQUELA: ICD-10-CM

## 2025-08-28 DIAGNOSIS — R60.0 PEDAL EDEMA: ICD-10-CM

## 2025-08-28 PROCEDURE — 1111F DSCHRG MED/CURRENT MED MERGE: CPT | Performed by: NURSE PRACTITIONER

## 2025-08-28 PROCEDURE — 1160F RVW MEDS BY RX/DR IN RCRD: CPT | Performed by: NURSE PRACTITIONER

## 2025-08-28 PROCEDURE — 1159F MED LIST DOCD IN RCRD: CPT | Performed by: NURSE PRACTITIONER

## 2025-08-28 PROCEDURE — 99308 SBSQ NF CARE LOW MDM 20: CPT | Performed by: NURSE PRACTITIONER

## 2025-08-29 ENCOUNTER — SNF VISIT (OUTPATIENT)
Dept: INTERNAL MEDICINE CLINIC | Age: 89
End: 2025-08-29

## 2025-08-29 VITALS
WEIGHT: 124.81 LBS | SYSTOLIC BLOOD PRESSURE: 160 MMHG | HEART RATE: 65 BPM | TEMPERATURE: 98 F | OXYGEN SATURATION: 93 % | BODY MASS INDEX: 24 KG/M2 | DIASTOLIC BLOOD PRESSURE: 64 MMHG | RESPIRATION RATE: 16 BRPM

## 2025-08-29 DIAGNOSIS — R60.0 PEDAL EDEMA: ICD-10-CM

## 2025-08-29 DIAGNOSIS — D62 ACUTE BLOOD LOSS AS CAUSE OF POSTOPERATIVE ANEMIA: ICD-10-CM

## 2025-08-29 DIAGNOSIS — S72.002S CLOSED FRACTURE OF LEFT HIP, SEQUELA: ICD-10-CM

## 2025-08-29 DIAGNOSIS — N18.31 CHRONIC KIDNEY DISEASE, STAGE 3A (HCC): Primary | ICD-10-CM

## 2025-08-29 PROCEDURE — 1111F DSCHRG MED/CURRENT MED MERGE: CPT | Performed by: NURSE PRACTITIONER

## 2025-08-29 PROCEDURE — 99308 SBSQ NF CARE LOW MDM 20: CPT | Performed by: NURSE PRACTITIONER

## (undated) DEVICE — Device

## (undated) DEVICE — HEMOCLIP HORIZON MED 002200

## (undated) DEVICE — GLOVE BIOGEL M SURG SZ 6-1/2

## (undated) DEVICE — SLEEVE COMPR MD KNEE LEN SGL USE KENDALL SCD

## (undated) DEVICE — CHLORAPREP 26ML APPLICATOR

## (undated) DEVICE — CAUTERY BLADE 2IN INS E1455

## (undated) DEVICE — GOWN,SIRUS,FABRIC-REINFORCED,LARGE: Brand: MEDLINE

## (undated) DEVICE — SUTURE VICRYL 2-0 CT-2

## (undated) DEVICE — HEMOCLIP HORIZON SM 001200

## (undated) DEVICE — SOL  .9 1000ML BTL

## (undated) DEVICE — SOLUTION IRRIG 1000ML 0.9% NACL USP BTL

## (undated) DEVICE — GLOVE SUR 7.5 SENSICARE PI PIP CRM PWD F

## (undated) DEVICE — DRAPE,TAPE STRIPS,STERILE: Brand: MEDLINE

## (undated) DEVICE — DRAPE C ARM TRNSPAR POLY PROTCT BARR FOR UNIV

## (undated) DEVICE — SUT COAT VCRL 0 27IN CP-1 ABSRB UD 36MM 1/2

## (undated) DEVICE — SUTURE MONOCRYL 4-0 PS-2

## (undated) DEVICE — SYRINGE 10ML LL TIP

## (undated) DEVICE — DRAPE PACK CHEST & U BAR

## (undated) DEVICE — COVER LT HNDL RIG FOR SUR CAM DISP

## (undated) DEVICE — KENDALL SCD EXPRESS SLEEVES, KNEE LENGTH, MEDIUM: Brand: KENDALL SCD

## (undated) DEVICE — SUTURE ETHIBOND EXCEL 0 MO-7

## (undated) DEVICE — SUT MCRYL 3-0 27IN ABSRB UD 19MM PS-2 3/8

## (undated) DEVICE — GAUZE SPONGES,12 PLY: Brand: CURITY

## (undated) DEVICE — SUTURE VICRYL 2-0

## (undated) DEVICE — GLOVE SURG TRIUMPH SZ 6-1/2

## (undated) DEVICE — DRAPE SUR W70XL100IN STD SMS POLYPR FULL SHT

## (undated) DEVICE — UNDERPAD INCNT 30X30IN PP HVY

## (undated) DEVICE — BREAST-HERNIA-PORT CDS-LF: Brand: MEDLINE INDUSTRIES, INC.

## (undated) DEVICE — UNDYED BRAIDED (POLYGLACTIN 910), SYNTHETIC ABSORBABLE SUTURE: Brand: COATED VICRYL

## (undated) DEVICE — GAUZE SPONGES,USP TYPE VII GAUZE, 12 PLY: Brand: CURITY

## (undated) DEVICE — SUTURE VICRYL 3-0 SH

## (undated) DEVICE — SUTURE VICRYL 5-0 RB-1

## (undated) DEVICE — BRA ZIPPERED STYLE 958 LARGE

## (undated) DEVICE — TOWEL: OR BLU 80/CS: Brand: MEDICAL ACTION INDUSTRIES

## (undated) DEVICE — PACK PBDS HIP PINNING

## (undated) DEVICE — SUTURE SILK 2-0 FS

## (undated) DEVICE — ABDOMINAL PAD: Brand: DERMACEA

## (undated) DEVICE — GLOVE SUR 7.5 DERMASSURE PCP DK GRN PWD F

## (undated) DEVICE — CONT SPEC SURG FAXITRON WEDGE

## (undated) NOTE — Clinical Note
05/24/2017        Nolene Goods  [address]  [facility] [address]      Dear Torres Tello,    Our records indicate that you have outstanding lab work and or testing that was ordered for you and has not yet been completed:      Fecal Occult Blood, Immunoassay

## (undated) NOTE — IP AVS SNAPSHOT
[facility] [place] [address] [place], [address] ~ [phone]                Discharge Summary   2/6/2017    Eugene Covington           Admission Information        Provider Department    2/6/2017 Arti Sinha MD Eh Katharina Sarmiento / Pascual Jones Emilia Nova     [    ]    [    ]    [    ]    [    ]       spironolactone 25 MG Tabs   Commonly known as:  ALDACTONE        Take 0.5 tablets (12.5 mg total) by mouth daily.     Mitchell Driscoll     [    ]    [    ]    [    ]    [    ]       Thiamine The patient may resume a general diet immediately. This is not a good time to eat excessively.  The patient should eat in moderation and stick with foods the patient feels are easy to digest. There should be no alcohol consumption in the immediate recover t Patients should seek further activity limits at the time of their appointment.     APPOINTMENT  Please call our office today for an appointment within five to ten days of discharge Any fever greater than 100.5, chills, nausea, vomiting, or severe diarrhea p · Take this medication as directed    Abigail Sahuw is a narcotic and can be constipating or can upset your stomach  · Don't take Norco on an empty stomach  · Drink plenty of water    Thank you for choosing [facility].  It was a pleasure taking care of you. (01/08/17)  87.4 (01/08/17)  5.1 (01/08/17)  6.1 (01/08/17)  0.0 (01/08/17)  0.1  (01/08/17)  11.24 (H) (01/08/17)  0.65 (L) (01/08/17)  0.79 (H) (01/08/17)  0.00 (01/08/17)  3.81      Metabolic Lab Results  (Last result in the past 90 days)    HgbA1C Glu [email or web address]. If you've recently had a stay at the Hospital you can access your discharge instructions in [employer] by going to Visits < Admission Summaries.  If you've been to the Emergency Department or your doctor's office, you can view yo

## (undated) NOTE — LETTER
08/17/20        Saira Colin  [address]  [address] [place] [id number]      Dear Raya Carranza,    Our records indicate that you have outstanding lab work and or testing that was ordered for you and has not yet been completed:  Orders Placed This Encounter

## (undated) NOTE — MR AVS SNAPSHOT
[address], [address] [place], [place] [address] [phone]               Thank you for choosing us for your health care visit with Author DO Nehal.   We are glad to serve you and happy to provide you with [facility] or by on call physicians. ? By law, narcotics cannot be faxed or phoned into your pharmacy. The prescription must be signed by the provider, picked up in our office and physically brought to the pharmacy.   A 30 day supply with no refills is the maximum al procedure is authorized, this does not guarantee payment. \"    Ultimately the patient is responsible for payment. Thank you for your understanding in the matter.            Allergies as of Apr 06, 2017     Ace Inhibitors Coughing    Amlodipine Swelling Summaries. If you've been to the Emergency Department or your doctor's office, you can view your past visit information in [employer] by going to Visits < Visit Summaries. [employer] questions? Call [phone] for help.   [employer] is NOT to be used for urge

## (undated) NOTE — MR AVS SNAPSHOT
[facility]  [address] [address] [phone]               Thank you for choosing us for your health care visit with Thor Montoya MD.  We are glad to serve you and happy to provide you with this summary of y famoTIDine 20 MG Tabs   Take 20 mg by mouth 2 (two) times daily. Commonly known as:  PEPCID           Labetalol HCl 300 MG Tabs   Take 300 mg by mouth 2 (two) times daily.    Commonly known as:  NORMODYNE           losartan 100 MG Tabs   Take 1 tablet (1

## (undated) NOTE — LETTER
October 30, 2019    Annamarie [address]  [place] [address]    Dear Lisette Monroy: It was a pleasure speaking with you over the phone recently.  To follow up, I wanted to send you some contact information to utilize when you have a questi

## (undated) NOTE — LETTER
[address] [address]  PH: [phone]  FAX: [phone]    Medical Clearance Request    No Recipients    The patient listed below is scheduled for surgery and needs the following pre-o

## (undated) NOTE — LETTER
04/28/21        Annamarie [facility]  [address]  [address] [place] [address]      Dear Lisette Monroy,    Our records indicate that you have outstanding lab work and or testing that was ordered for you and has not yet been completed:  Orders Placed This Encounter

## (undated) NOTE — Clinical Note
GENERAL SURGERY    Ethel Mayer MD, FACS, Felice Carrillo. Alejandrina Piña MD, Jonathan Shaver MD, FACS  Stephy Arias.  Melissa Rubin MD, Antonette Jensen MD, FACS  Sheldon Wilson, BISI Spain

## (undated) NOTE — LETTER
07/27/20        Carlos Kiran  [address]  [address] [place] [address]      Dear Richie Tomlinson,    Our records indicate that you have outstanding lab work and or testing that was ordered for you and has not yet been completed:  Orders Placed This Encounter

## (undated) NOTE — MR AVS SNAPSHOT
[facility]  [address] [address] [phone]               Thank you for choosing us for your health care visit with Gigi Ruiz MD.  We are glad to serve you and happy to provide you with this summary of y Allergies as of Jan 31, 2017     Ace Inhibitors Coughing    Amlodipine Swelling    Furosemide Other (See Comments)    hyponatremia    Thiazide-Type Diuretics Other (See Comments)    hyponatremia                Today's Vital Signs     BP Pulse Temp Height W Summaries. If you've been to the Emergency Department or your doctor's office, you can view your past visit information in [employer] by going to Visits < Visit Summaries. [employer] questions? Call [phone] for help.   [employer] is NOT to be used for urge

## (undated) NOTE — LETTER
03/09/19        Patricio Hem  [address]  [place] [address]      Dear Destinee Park,    Our records indicate that you have outstanding lab work and or testing that was ordered for you and has not yet been completed:  Orders Placed This Encounter

## (undated) NOTE — LETTER
09/06/17        Johnny Felton  [address]  [address] [facility] [address]      Dear Eric Barajas,    Our records indicate that you have outstanding lab work and or testing that was ordered for you and has not yet been completed:          Fecal Occult Blood, Immunoass

## (undated) NOTE — MR AVS SNAPSHOT
Neli Holt [address] [place] [address]  [phone]               Thank you for choosing us for your health care visit with Lalo Gamble MD.  We are glad to serve you and happy to provide you with this call [facility] Scheduling at [phone].          Reason for Today's Visit     Well Adult           Medical Issues Discussed Today     Cataract    Discoid lupus    Hemiparesis affecting left side as late effect of stroke ([facility] [address]    History of Check annually     Cardiovascular Disease Screening     Cholesterol, covered every 5 yrs including Total, LDL and Trigs LDL CHOLESTEROL (mg/dL)   Date Value   04/04/2017 42     CHOLESTEROL, TOTAL (mg/dL)   Date Value   04/04/2017 154     TRIGLYCERIDES (mg/ diagnosis related to osteoporosis or estrogen deficiency. Biennial benefit unless patient has history of long-term glucocorticoid use for medical condition    No results found for this or any previous visit. No flowsheet data found.     Recommended for 2 Zoster (Not covered by Medicare Part B) No orders found for this or any previous visit. This may be covered with your pharmacy  prescription benefits     We have your advance directives.         Allergies as of Apr 19, 2017     Ace Inhibitors Coughing    A If you've recently had a stay at the Hospital you can access your discharge instructions in [employer] by going to Visits < Admission Summaries.  If you've been to the Emergency Department or your doctor's office, you can view your past visit information in My

## (undated) NOTE — LETTER
09/21/17        [facility]  [address]  [place] [address]      Dear Cathleen Tovar,    Our records indicate that you have outstanding lab work and or testing that was ordered for you and has not yet been completed:          CBC W Differential W Platelet

## (undated) NOTE — IP AVS SNAPSHOT
Patient Demographics     Address Phone E-mail Address    [address] [address] [phone] (Home) *Preferred*  [phone] (Mobile) [email or web address]      Emergency Contact(s)     Name Relation Home Work [place] [phone] Last time this was given:  200 mg on 1/11/2017  9:26 AM   Commonly known as:  PACERONE   Next dose due: Tomorrow morning 1/12        Take 1 tablet (200 mg total) by mouth daily.     Abraham Silence                           Amoxicillin-Pot Clavulanate [address]                                 Labetalol HCl 200 MG Tabs   Last time this was given:  200 mg on 1/11/2017  9:26 AM   Commonly known as:  Brigida De Los Santos   Next dose due:   Tonight at bedtime         Take 1 tablet (200 mg total) by mouth every 12 (tw - GuaiFENesin  MG Tb12  - ipratropium-albuterol 0.5-2.5 (3) MG/3ML Soln  - Labetalol HCl 200 MG Tabs  - Losartan Potassium 100 MG Tabs  - predniSONE 10 MG Tabs            [id number] - MAR ACTION REPORT  (last 24 hrs)    ** SITE UNKNOWN **     Order [id number] predniSONE (DELTASONE) tab 20 mg 01/11/17 0926 Given      [id number] spironolactone (ALDACTONE) tab 12.5 mg 01/11/17 0925 Given                    Recent Vital Signs       Most Recent Value    Vitals 134/56 mmHg Filed at 01/11/2017 1300    Pulse Specimen Information:  Other from Sputum      Sputum Culture Result Culture not performed      Sputum Smear        Result:        Specimen contaminated with epithelial cells representing oropharyngeal  contamination    Respiratory Panel FLU expanded Once • Endometrial cancer [facility]) 2009   • Fibrocystic breast changes 2009   • Atypical chest pain 03/10   • Osteoarthritis of hip 11/09   • Unspecified essential hypertension      office BP runs higher than home.         Past Surgical History:     Past Surgical H Ht 5' 4\" (1.626 m)  Wt 147 lb (66.679 kg)  BMI 25.22 kg/m2  SpO2 94%  General: No acute distress. Alert and oriented x 3. HEENT: Normocephalic atraumatic. Dry mucous membranes. EOM-I. PERRLA. Anicteric. Neck: No JVD. No carotid bruits. Respiratory:  Tac 1. Can order RUQ US if doesn't improve with hydration  6. Cutaneous lupus  7. Anemia  8. HTN  1. Hold home spironolactone  2.  Continue home labetalol      Quality:  · DVT Prophylaxis: heparin  · CODE status: DNR  · Jones: none    Plan of care discussed wit hyponatremia secondary to possible upper respiratory infection and flu, has had nausea and vomiting for a period of time. The patient also has been dehydrated from no oral intake. She denies any chest pain, shortness of breath, or previous MI.   She den diminished. Extremities without gangrene or tissue loss. The skin, otherwise, is intact at this time. Joints without swelling or tenderness. No clubbing of the fingertips.     LABORATORY DATA:  The patient's last platelet count was 457,159, hemoglobin 1 No notes of this type exist for this encounter. Physical Therapy Notes (last 72 hours) (Notes from 1/8/2017  2:48 PM through 1/11/2017  2:48 PM)      Physical Therapy Note by Radha Simpson PTA at 1/10/2017  9:14 AM  Version 1 of 1    Author:   Tung Past Surgical History    BREAST LUMPECTOMY  11/09    Comment benign fibroadenoma    TOTAL HIP REPLACEMENT  07/00    Comment at [facility], [address], [address]    [address], [place]       SUBJECTIVE  2      Patient’s self-state 160 ft c RW and supervision. Pt demonstrates decreased sathish and decreased B foot clearance/step lengths. Pt rates activity as \"light\" Pt instructed in pacing, rest breaks as needed, and pursed lip breathing due to slight KETTY.  Pt negotiates 3 steps wit PT to address the above deficits to assist patient in returning to prior level of function. Recommend AR upon hospital D/C in order to maximize return to baseline level of mobility.  Patient has goal of traveling to [place] [facility] [place] [facility]\"" at the end of February Author:  Steven Schulz PT Service:  (none) Author Type:  Physical Therapist    Filed:  1/8/2017  4:17 PM Note Time:  1/8/2017  4:09 PM Status:  Signed    :  Steven Schulz PT (Physical Therapist)            PHYSICAL THERAPY TREATMENT NOTE - [employer] AM-PAC '6-Clicks' INPATIENT SHORT FORM - BASIC MOBILITY  How much difficulty does the patient currently have. ..  -   Turning over in bed (including adjusting bedclothes, sheets and blankets)?: A Little   -   Sitting down on and standing up from a chair wit exercises in the recliner. Patient ambulates in the hallway for approx 75ft, with RW and CGA. Patient continues to have limitations in endurance, strength, balance, coordination and gait distance/speed.  Patient will benefit from continued IPPT services to with MARLYS stenosis; L side neglect, L side hemiparesis, L facial droop; received IV t-pa and weakness has improved. 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 It is reconstituted in its mid cervical segment.  There is a PAIN ASSESSMENT  Ratin           ACTIVITY TOLERANCE  Room air    ACTIVITIES OF DAILY LIVING ASSESSMENT  AM-PAC ‘6-Clicks’ Inpatient Daily Activity Short Form  How much help from another person does the patient currently need…  -   Putting on attempts required to complete, cueing needed to input numbers. Discussed discharge planning, IADL safety, and plan of care at length with pt/spouse. Patient End of Session: Up in chair;Needs met;Call light within reach;RN aware of session/findings; All pa Pt will complete visual scanning task by discharge. -ongoing               Video Swallow Study Notes     No notes of this type exist for this encounter.          SLP Note - SLP Notes      SLP Note by PRINCESS Garcia at 1/11/2017 11:31 AM  Version 1 o

## (undated) NOTE — LETTER
Patient Name: Eugene Covington  YOB: 1935          MRN number:  [id number]  Date:  1/29/2017  Referring Physician:  Sridhar Maria       NEUROLOGICAL EVALUATION:    Referring Physician: Dr. Sheri Crisostomo   Diagnosis: CVA, hemiparesis, hemiataxia   Date of Serv Current functional limitations include diminished balance, strength, and activity tolerance, unable to drive or participate in water aerobic (must wait for medical release), not participating in IADLs as much as before hospitalization ( doing much o to PLOF including water aerobics class at MD instruction. .     Precautions:  Fall Risk  OBJECTIVE:   Observation/Posture: Patient sits and stands with rounded shoulders and forward head, mod flexible to VC.  Patient is very soft spoken and appears moderate within session; often looks down at floor/feet, very cautious around corners taking a wide berth.    Timed Up and Go (AD, time): 12 sec      Fall Risk: yes  4 Item Dynamic Gait Index Score: 8/12     Fall Risk: yes    Today’s Treatment and Response: Good ove Education or treatment limitation: None  Rehab Potential:good    Current status G Code: [id number] CK  Goal status G Code:  CJ  FOTO: 58/100    Patient/Family/Caregiver was advised of these findings, precautions, and treatment options and has agreed to act

## (undated) NOTE — LETTER
24    Patient: James Ramírez  : 1935 Visit date: 2024    Dear  Angeles Mcintosh DO    Thank you for referring James Ramírez to my practice.  Please find my assessment and plan below.        Good morning.  Thank you for the referral.  I saw Ms. Ramírez in my clinic today for ventral hernia.  On physical exam, she has approximately 2 cm defect.  Her hernia is reducible and nontender.  She also denies any other symptoms including nausea, vomiting, or abdominal pain.  I recommend continued observation.  Symptoms of incarceration were discussed.  If Ms. Ramírez has any of these, she is to call my office as she may need surgery sooner.  Thank you once again for the referral and let me know if you need anything else.    Sincerely,       Autumn Galvan MD

## (undated) NOTE — Clinical Note
2017    Patient: Chin Hung  : 1935 Visit date: 2017    Dear  Dr. Chavez Groves MD,    Thank you for referring Chin Hung to my practice. Please find my assessment and plan below.        Assessment:      The patient is a 68-year-old fema Dr Lorrei Scruggs will arrange for [facility] to see heme onc and cardiology tomorrow for evaluation prior to surgery and recommendations regarding Eunetta's anti-coagulation.   [facility] and her  will contact Dr Joanna Banegas office in the morning for contact isma

## (undated) NOTE — MR AVS SNAPSHOT
Tashia Holt [address] [place] [id number] [phone]               Thank you for choosing us for your health care visit with Eric Skinner MD.  We are glad to serve you and happy to provide you with this [address] [facility] [address])    [address] [place] [address]  [phone]           If your coming to the office for Medication refills, please bring all medicaitons to your Today's Orders     Surgery Referral - In Network    Complete by:  As directed    Assoc Dx:   Incarcerated umbilical hernia [P09.5]                 Referral Details     Referred By    Referred To    MD Meenu Houston [address] Call [phone] for help. Seeonict is NOT to be used for urgent needs. For medical emergencies, dial 911.            Visit [facility] online at  [email or web address]

## (undated) NOTE — MR AVS SNAPSHOT
Melida Holt [address] [place] [address]  [phone]               Thank you for choosing us for your health care visit with Lencho Morrissey MD.  We are glad to serve you and happy to provide you with this Amlodipine Swelling    Furosemide Other (See Comments)    hyponatremia    Thiazide-Type Diuretics Other (See Comments)    hyponatremia                Today's Vital Signs     BP Pulse Temp Height Weight BMI    108/62 mmHg 67 97.7 °F (36.5 °C) (Oral) 63\" 1 Assoc Dx:  Acute right MCA stroke ([facility] [I63.511], Chronic atrial fibrillation ([facility] [I48.2], Essential hypertension [I10], Syncope, unspecified syncope type [R55]           Pulmonary Referral - In Network    Complete by:  As directed    Assoc Dx:  Commun Referral Orders      Normal Orders This Visit    CARDIO - INTERNAL [[id number] CPT(R)]  Order #:  [id number]         **REFERRAL REQUEST**    Your physician has referred you to a specialist.  Your physician or the clinic staff will provide you with the phone n PULMONARY - INTERNAL [[id number] CUSTOM]  Order #:  [id number]         **REFERRAL REQUEST**    Your physician has referred you to a specialist.  Your physician or the clinic staff will provide you with the phone number you should call to schedule your appoin

## (undated) NOTE — IP AVS SNAPSHOT
[facility] [place] [address] [place], [address] ~ [phone]                Discharge Summary   1/3/2017    [facility]           Admission Information        Provider Department    1/3/2017 Radha Garza MD  7ne-A         Thank Atorvastatin Calcium 40 MG Tabs   Last time this was given:  40 mg on 1/10/2017  8:17 PM   Commonly known as:  LIPITOR   Next dose due: Tonight at bedtime         Take 1 tablet (40 mg total) by mouth nightly.     Kenyetta Wan Last time this was given:  20 mg on 1/11/2017  9:26 AM   Commonly known as:  Branden Pritchard   Next dose due:  2 Tablets tonight   3 Tablets daily for 3 days   2 Tablets daily for 3 days  1 Tablet daily for 3 days         Take Prednisone 2 tabs tonight.  Then [address] [address]  [phone]          Follow up with Sloan Esparza MD.    Specialties:  PULMONARY DISEASES, Intensivist    Why:  follow up with Dr. Juana Taylor once discharged from [facility].  Call for appointment    Donte Mackay Recent Hematology Lab Results  (Last 3 results in the past 90 days)    WBC RBC Hemoglobin Hematocrit MCV MCH MCHC RDW Platelet MPV    (49/11/77)  11.8 (01/09/17)  3.11 (L) (01/09/17)  10.7 (L) (01/09/17)  30.5 (L) (01/09/17)  98.1 (01/09/17)  34.4 (H) (01/ Patient Belongings       Most Recent Value    All belongings returned to patient at discharge Pt's bedside belongings    Medications Sent Home None to return    Medications Returned:           Additional Information       We are concerned for your overall w review your medications with you before you are discharged, and can provide you with additional printed information. Not all patients will experience these side effects or respond to medications the same.  Please call your provider or healthcare team if you What to report to your healthcare team: Bruising, blood in urine or stool, or nosebleeds             GI Medications     famoTIDine 20 MG Oral Tab       Use:  Nausea/vomiting, acid reflux, low bowel motility, stomach pain   Most common side effects:  Depend

## (undated) NOTE — MR AVS SNAPSHOT
Melida Holt [address] [place] [address]  [phone]               Thank you for choosing us for your health care visit with Lencho Morrissey MD.  We are glad to serve you and happy to provide you with this Hyponatremia    On amiodarone therapy    -  Primary    Paroxysmal atrial fibrillation        Screening for deficiency anemia        Hyperlipidemia, unspecified hyperlipidemia type        Medication monitoring encounter        Vitamin B12 deficiency MyChart     Visit [facility]  You can access your MyChart to more actively manage your health care and view more details from this visit by going to [email or web address].   If you've recently had a stay at the Hospital you can access your disc

## (undated) NOTE — LETTER
ASTHMA ACTION PLAN for Gagandeep Spain     : 1935     Date: 2020  Provider:  Luigi Pack MD  Phone for doctor or clinic: [facility], [address] [facility]# [address] [phone]

## (undated) NOTE — MR AVS SNAPSHOT
[facility], [facility] 07, 1973 . [address] [address] [phone]               Thank you for choosing us for your health care visit with Juliette Card DO.   We are glad to serve you and happy to provide you with [facility] family member will be picking up prescription, office must be given name of individual in advance and they must present an ID as well. ? The name of the person picking up your prescription must be documented in your chart.   Scheduling Tests    If your phy [address]    [address] [facility], [address] [id number] [phone]              Allergies as of Feb 09, 2017     Ace Inhibitors Coughing    Amlodipine Swelling    Furosemide Other (See Comments)    hyponatremia    Thiazide-Type Diuretics Oth discharge instructions in [employer] by going to Visits < Admission Summaries. If you've been to the Emergency Department or your doctor's office, you can view your past visit information in [employer] by going to Visits < Visit Summaries. [employer]ist[employer] questions?

## (undated) NOTE — LETTER
10/08/19        Dakotah Fairly  [address]  [address] [facility] [address]      Dear Mary Cordero,    Our records indicate that you have outstanding lab work and or testing that was ordered for you and has not yet been completed:  Orders Placed This Encounter